# Patient Record
Sex: FEMALE | Race: WHITE | Employment: OTHER | URBAN - METROPOLITAN AREA
[De-identification: names, ages, dates, MRNs, and addresses within clinical notes are randomized per-mention and may not be internally consistent; named-entity substitution may affect disease eponyms.]

---

## 2019-08-29 ENCOUNTER — ANESTHESIA EVENT (OUTPATIENT)
Dept: ENDOSCOPY | Age: 69
End: 2019-08-29
Payer: MEDICARE

## 2019-08-29 ENCOUNTER — HOSPITAL ENCOUNTER (OUTPATIENT)
Age: 69
Setting detail: OUTPATIENT SURGERY
Discharge: HOME | End: 2019-08-30
Attending: INTERNAL MEDICINE | Admitting: INTERNAL MEDICINE
Payer: MEDICARE

## 2019-08-29 ENCOUNTER — ANESTHESIA (OUTPATIENT)
Dept: ENDOSCOPY | Age: 69
End: 2019-08-29
Payer: MEDICARE

## 2019-08-29 VITALS
DIASTOLIC BLOOD PRESSURE: 70 MMHG | TEMPERATURE: 97.9 F | BODY MASS INDEX: 25.58 KG/M2 | WEIGHT: 139 LBS | SYSTOLIC BLOOD PRESSURE: 120 MMHG | OXYGEN SATURATION: 99 % | HEART RATE: 69 BPM | HEIGHT: 62 IN | RESPIRATION RATE: 22 BRPM

## 2019-08-29 LAB
H PYLORI FROM TISSUE: NEGATIVE
H PYLORI FROM TISSUE: NEGATIVE
KIT LOT NO., HCLOLOT: NORMAL
KIT LOT NO., HCLOLOT: NORMAL
NEGATIVE CONTROL: NEGATIVE
NEGATIVE CONTROL: NEGATIVE
POSITIVE CONTROL: POSITIVE
POSITIVE CONTROL: POSITIVE

## 2019-08-29 PROCEDURE — 87077 CULTURE AEROBIC IDENTIFY: CPT | Performed by: INTERNAL MEDICINE

## 2019-08-29 PROCEDURE — 74011250636 HC RX REV CODE- 250/636: Performed by: NURSE ANESTHETIST, CERTIFIED REGISTERED

## 2019-08-29 PROCEDURE — 76060000031 HC ANESTHESIA FIRST 0.5 HR: Performed by: INTERNAL MEDICINE

## 2019-08-29 PROCEDURE — 77030021593 HC FCPS BIOP ENDOSC BSC -A: Performed by: INTERNAL MEDICINE

## 2019-08-29 PROCEDURE — 76040000019: Performed by: INTERNAL MEDICINE

## 2019-08-29 PROCEDURE — 88305 TISSUE EXAM BY PATHOLOGIST: CPT

## 2019-08-29 RX ORDER — FENTANYL CITRATE 50 UG/ML
200 INJECTION, SOLUTION INTRAMUSCULAR; INTRAVENOUS
Status: ACTIVE | OUTPATIENT
Start: 2019-08-29 | End: 2019-08-29

## 2019-08-29 RX ORDER — SODIUM CHLORIDE 0.9 % (FLUSH) 0.9 %
5-40 SYRINGE (ML) INJECTION EVERY 8 HOURS
Status: DISCONTINUED | OUTPATIENT
Start: 2019-08-29 | End: 2019-08-30 | Stop reason: HOSPADM

## 2019-08-29 RX ORDER — LOSARTAN POTASSIUM 50 MG/1
50 TABLET ORAL
COMMUNITY

## 2019-08-29 RX ORDER — NALOXONE HYDROCHLORIDE 0.4 MG/ML
0.4 INJECTION, SOLUTION INTRAMUSCULAR; INTRAVENOUS; SUBCUTANEOUS
Status: ACTIVE | OUTPATIENT
Start: 2019-08-29 | End: 2019-08-29

## 2019-08-29 RX ORDER — FLUMAZENIL 0.1 MG/ML
0.2 INJECTION INTRAVENOUS
Status: ACTIVE | OUTPATIENT
Start: 2019-08-29 | End: 2019-08-29

## 2019-08-29 RX ORDER — SODIUM CHLORIDE 9 MG/ML
50 INJECTION, SOLUTION INTRAVENOUS CONTINUOUS
Status: DISPENSED | OUTPATIENT
Start: 2019-08-29 | End: 2019-08-29

## 2019-08-29 RX ORDER — CLOPIDOGREL BISULFATE 75 MG/1
75 TABLET ORAL DAILY
COMMUNITY

## 2019-08-29 RX ORDER — DEXTROMETHORPHAN/PSEUDOEPHED 2.5-7.5/.8
1.2 DROPS ORAL
Status: DISCONTINUED | OUTPATIENT
Start: 2019-08-29 | End: 2019-08-30 | Stop reason: HOSPADM

## 2019-08-29 RX ORDER — MIDAZOLAM HYDROCHLORIDE 1 MG/ML
.25-1 INJECTION, SOLUTION INTRAMUSCULAR; INTRAVENOUS
Status: ACTIVE | OUTPATIENT
Start: 2019-08-29 | End: 2019-08-29

## 2019-08-29 RX ORDER — SODIUM CHLORIDE 9 MG/ML
INJECTION, SOLUTION INTRAVENOUS
Status: DISCONTINUED | OUTPATIENT
Start: 2019-08-29 | End: 2019-08-29 | Stop reason: HOSPADM

## 2019-08-29 RX ORDER — EPINEPHRINE 0.1 MG/ML
1 INJECTION INTRACARDIAC; INTRAVENOUS
Status: ACTIVE | OUTPATIENT
Start: 2019-08-29 | End: 2019-08-30

## 2019-08-29 RX ORDER — ZOLEDRONIC ACID 5 MG/100ML
5 INJECTION, SOLUTION INTRAVENOUS ONCE
COMMUNITY

## 2019-08-29 RX ORDER — SODIUM CHLORIDE 0.9 % (FLUSH) 0.9 %
5-40 SYRINGE (ML) INJECTION AS NEEDED
Status: DISCONTINUED | OUTPATIENT
Start: 2019-08-29 | End: 2019-08-30 | Stop reason: HOSPADM

## 2019-08-29 RX ORDER — PROPOFOL 10 MG/ML
INJECTION, EMULSION INTRAVENOUS AS NEEDED
Status: DISCONTINUED | OUTPATIENT
Start: 2019-08-29 | End: 2019-08-29 | Stop reason: HOSPADM

## 2019-08-29 RX ORDER — ATROPINE SULFATE 0.1 MG/ML
0.5 INJECTION INTRAVENOUS
Status: ACTIVE | OUTPATIENT
Start: 2019-08-29 | End: 2019-08-30

## 2019-08-29 RX ADMIN — PROPOFOL 50 MG: 10 INJECTION, EMULSION INTRAVENOUS at 10:20

## 2019-08-29 RX ADMIN — SODIUM CHLORIDE: 900 INJECTION, SOLUTION INTRAVENOUS at 10:14

## 2019-08-29 RX ADMIN — PROPOFOL 100 MG: 10 INJECTION, EMULSION INTRAVENOUS at 10:17

## 2019-08-29 RX ADMIN — PROPOFOL 50 MG: 10 INJECTION, EMULSION INTRAVENOUS at 10:18

## 2019-08-29 NOTE — DISCHARGE INSTRUCTIONS
Dr Chidi Bhatti of [address] [place] [address]. [address], Jason Hassan  [id number]  1950    EGD DISCHARGE INSTRUCTIONS    DISCOMFORT  Sore throat-  warm salt water gargle  redness at IV site- apply warm compress to area; if redness or soreness persist- contact your physician  Gaseous discomfort- walking, belching will help relieve any discomfort  You may not operate a vehicle for 12 hours  You may not engage in an occupation involving machinery or appliances for rest of today  You may not drink alcoholic beverages for at least 12 hours  Avoid making any critical decisions for at least 24 hour  DIET  You may resume your regular diet. Avoid these foods: vegetables, fried / greasy foods, carbonated drinks    ACTIVITY  Spend the remainder of the day resting -  avoid any strenuous activity. You may then resume your normal daily activities. CALL M.D.  IF ANY SIGN OF   Increasing pain, nausea, vomiting  Abdominal distension (swelling)  New increased bleeding (oral or rectal)  Fever (chills)  Pain in chest area  Bloody discharge from nose or mouth  Shortness of breath    FOLLOW-UP INSTRUCTIONS:  Call Dr. Jarrell Mcqueen for any questions or problems. Telephone # [phone]  Biopsy results available  in  5 to 7 days. We will see you in the office in 2 weeks. Impression:  Gastritis  Hiatal Hernia  Esophagitis  Patient Education   Patient Education   Patient Education        Esophagitis: Care Instructions  Your Care Instructions    Esophagitis (say \"ih-sof-uh-JY-tus\") is irritation of the esophagus, the tube that carries food from your throat to your stomach. Acid reflux is the most common cause of this condition. When you have reflux, stomach acid and juices flow upward. This can cause pain or a burning feeling in your chest. You may have a sore throat. It may be hard to swallow.   Other causes of this condition include some medicines and supplements. Allergies or an infection can also cause it. Your doctor will ask about your symptoms and past health. He or she might do tests to find the cause of your symptoms. Treatment depends on what is causing the problem. Treatment might include changing your diet or taking medicine to relieve your symptoms. It might also include changing a medicine that is causing your symptoms. If you have reflux, medicine that reduces the stomach acid helps your body heal. It might take 1 to 3 weeks to heal.  Follow-up care is a key part of your treatment and safety. Be sure to make and go to all appointments, and call your doctor if you are having problems. It's also a good idea to know your test results and keep a list of the medicines you take. How can you care for yourself at home? · If you have acid reflux, your doctor may recommend that you:  ? Eat several small meals instead of two or three large meals. After you eat, wait 2 to 3 hours before you lie down. ? Avoid chocolate, mint, alcohol, and spicy foods. ? Don't smoke or use smokeless tobacco. Smoking can make this condition worse. If you need help quitting, talk to your doctor about stop-smoking programs and medicines. These can increase your chances of quitting for good. ? Raise the head of your bed 6 to 8 inches if you have symptoms at night. ? Lose weight if you are overweight. ? Take an over-the-counter antacid, such as Maalox, Mylanta, or Tums. Be careful when you take over-the-counter antacid medicines. Many of these medicines have aspirin in them. Read the label to make sure that you are not taking more than the recommended dose. Too much aspirin can be harmful. ? Take stronger acid reducers. Examples are famotidine (such as Pepcid), omeprazole (such as Prilosec), and ranitidine (such as Zantac). · If your condition is caused by infection, allergy, or other problems, use the medicine or treatments that your doctor recommends.   · Be safe with medicines. Take your medicines exactly as prescribed. Call your doctor if you think you are having a problem with your medicine. When should you call for help? Call your doctor now or seek immediate medical care if:    · You have new or worse belly pain.     · You are vomiting.    Watch closely for changes in your health, and be sure to contact your doctor if:    · You have new or worse symptoms of reflux.     · You have trouble or pain swallowing.     · You are losing weight.     · You do not get better as expected. Where can you learn more? Go to http://shameka-hiro.info/. Enter W390 in the search box to learn more about \"Esophagitis: Care Instructions. \"  Current as of: November 7, 2018  Content Version: 12.1  © 4744-7357 [employer]. Care instructions adapted under license by [employer] (which disclaims liability or warranty for this information). If you have questions about a medical condition or this instruction, always ask your healthcare professional. [place] [address] any warranty or liability for your use of this information. Hiatal Hernia: Care Instructions  Your Care Instructions  A hiatal hernia occurs when part of the stomach bulges into the chest cavity. A hiatal hernia may allow stomach acid and juices to back up into the esophagus (acid reflux). This can cause a feeling of burning, warmth, heat, or pain behind the breastbone. This feeling may often occur after you eat, soon after you lie down, or when you bend forward, and it may come and go. You also may have a sour taste in your mouth. These symptoms are commonly known as heartburn or reflux. But not all hiatal hernias cause symptoms. Follow-up care is a key part of your treatment and safety. Be sure to make and go to all appointments, and call your doctor if you are having problems.  It's also a good idea to know your test results and keep a list of the medicines you take.  How can you care for yourself at home? · Take your medicines exactly as prescribed. Call your doctor if you think you are having a problem with your medicine. · Do not take aspirin or other nonsteroidal anti-inflammatory drugs (NSAIDs), such as ibuprofen (Advil, Motrin) or naproxen (Aleve), unless your doctor says it is okay. Ask your doctor what you can take for pain. · Your doctor may recommend over-the-counter medicine. For mild or occasional indigestion, antacids such as Tums, Gaviscon, Maalox, or Mylanta may help. Your doctor also may recommend over-the-counter acid reducers, such as famotidine (Pepcid AC), cimetidine (Tagamet HB), ranitidine (Zantac 75 and Zantac 150), or omeprazole (Prilosec). Read and follow all instructions on the label. If you use these medicines often, talk with your doctor. · Change your eating habits. ? It's best to eat several small meals instead of two or three large meals. ? After you eat, wait 2 to 3 hours before you lie down. Late-night snacks aren't a good idea. ? Chocolate, mint, and alcohol can make heartburn worse. They relax the valve between the esophagus and the stomach. ? Spicy foods, foods that have a lot of acid (like tomatoes and oranges), and coffee can make heartburn symptoms worse in some people. If your symptoms are worse after you eat a certain food, you may want to stop eating that food to see if your symptoms get better. · Do not smoke or chew tobacco.  · If you get heartburn at night, raise the head of your bed 6 to 8 inches by putting the frame on blocks or placing a foam wedge under the head of your mattress. (Adding extra pillows does not work.)  · Do not wear tight clothing around your middle. · Lose weight if you need to. Losing just 5 to 10 pounds can help. When should you call for help?   Call your doctor now or seek immediate medical care if:    · You have new or worse belly pain.     · You are vomiting.    Watch closely for changes in your health, and be sure to contact your doctor if:    · You have new or worse symptoms of indigestion.     · You have trouble or pain swallowing.     · You are losing weight.     · You do not get better as expected. Where can you learn more? Go to http://shameka-hiro.info/. Enter W561 in the search box to learn more about \"Hiatal Hernia: Care Instructions. \"  Current as of: November 7, 2018  Content Version: 12.1  © 5496-3254 [employer]. Care instructions adapted under license by [employer] (which disclaims liability or warranty for this information). If you have questions about a medical condition or this instruction, always ask your healthcare professional. [address] any warranty or liability for your use of this information. Gastritis: Care Instructions  Your Care Instructions    Gastritis is a sore and upset stomach. It happens when something irritates the stomach lining. Many things can cause it. These include an infection such as the flu or something you ate or drank. Medicines or a sore on the lining of the stomach (ulcer) also can cause it. Your belly may bloat and ache. You may belch, vomit, and feel sick to your stomach. You should be able to relieve the problem by taking medicine. And it may help to change your diet. If gastritis lasts, your doctor may prescribe medicine. Follow-up care is a key part of your treatment and safety. Be sure to make and go to all appointments, and call your doctor if you are having problems. It's also a good idea to know your test results and keep a list of the medicines you take. How can you care for yourself at home? · If your doctor prescribed antibiotics, take them as directed. Do not stop taking them just because you feel better. You need to take the full course of antibiotics. · Be safe with medicines.  If your doctor prescribed medicine to decrease stomach acid, take it as directed. Call your doctor if you think you are having a problem with your medicine. · Do not take any other medicine, including over-the-counter pain relievers, without talking to your doctor first.  · If your doctor recommends over-the-counter medicine to reduce stomach acid, such as Pepcid AC, Prilosec, Tagamet HB, or Zantac 75, follow the directions on the label. · Drink plenty of fluids (enough so that your urine is light yellow or clear like water) to prevent dehydration. Choose water and other caffeine-free clear liquids. If you have kidney, heart, or liver disease and have to limit fluids, talk with your doctor before you increase the amount of fluids you drink. · Limit how much alcohol you drink. · Avoid coffee, tea, cola drinks, chocolate, and other foods with caffeine. They increase stomach acid. When should you call for help? Call 911 anytime you think you may need emergency care. For example, call if:    · You vomit blood or what looks like coffee grounds.     · You pass maroon or very bloody stools.    Call your doctor now or seek immediate medical care if:    · You start breathing fast and have not produced urine in the last 8 hours.     · You cannot keep fluids down.    Watch closely for changes in your health, and be sure to contact your doctor if:    · You do not get better as expected. Where can you learn more? Go to http://shameka-hiro.info/. Enter [id number] in the search box to learn more about \"Gastritis: Care Instructions. \"  Current as of: November 7, 2018  Content Version: 12.1  © 4929-5423 [employer]. Care instructions adapted under license by [employer] (which disclaims liability or warranty for this information). If you have questions about a medical condition or this instruction, always ask your healthcare professional. [address] any warranty or liability for your use of this information.

## 2019-08-29 NOTE — ANESTHESIA POSTPROCEDURE EVALUATION
Post-Anesthesia Evaluation and Assessment    Patient: Annita Menjivar MRN: [id number]  SSN: [id number]    YOB: 1950  Age: 77 y.o. Sex: female      I have evaluated the patient and they are stable and ready for discharge from the PACU. Cardiovascular Function/Vital Signs  Visit Vitals  /62   Pulse 73   Temp 36 °C (96.8 °F)   Resp 22   Ht 5' 1.5\" (1.562 m)   Wt 63 kg (139 lb)   SpO2 100%   BMI 25.84 kg/m²       Patient is status post MAC anesthesia for Procedure(s):  ESOPHAGOGASTRODUODENOSCOPY (EGD)  ESOPHAGOGASTRODUODENAL (EGD) BIOPSY. Nausea/Vomiting: None    Postoperative hydration reviewed and adequate. Pain:  Pain Scale 1: Numeric (0 - 10) (08/29/19 0949)  Pain Intensity 1: 0 (08/29/19 0949)   Managed    Neurological Status: At baseline    Mental Status, Level of Consciousness: Alert and  oriented to person, place, and time    Pulmonary Status:   O2 Device: Nasal cannula (08/29/19 1027)   Adequate oxygenation and airway patent    Complications related to anesthesia: None    Post-anesthesia assessment completed. No concerns    Signed By: Natalie Nathan MD     August 29, 2019              Procedure(s):  ESOPHAGOGASTRODUODENOSCOPY (EGD)  ESOPHAGOGASTRODUODENAL (EGD) BIOPSY.     MAC    <BSHSIANPOST>    Vitals Value Taken Time   /62 8/29/2019 10:27 AM   Temp 36 °C (96.8 °F) 8/29/2019 10:27 AM   Pulse 73 8/29/2019 10:27 AM   Resp 22 8/29/2019 10:27 AM   SpO2 100 % 8/29/2019 10:27 AM

## 2019-08-29 NOTE — ANESTHESIA PREPROCEDURE EVALUATION
Relevant Problems   No relevant active problems       Anesthetic History   No history of anesthetic complications            Review of Systems / Medical History  Patient summary reviewed, nursing notes reviewed and pertinent labs reviewed    Pulmonary  Within defined limits                 Neuro/Psych   Within defined limits           Cardiovascular    Hypertension          Past MI, CAD and cardiac stents         GI/Hepatic/Renal  Within defined limits              Endo/Other      Hypothyroidism  Arthritis and cancer     Other Findings              Physical Exam    Airway  Mallampati: II  TM Distance: > 6 cm  Neck ROM: normal range of motion   Mouth opening: Normal     Cardiovascular  Regular rate and rhythm,  S1 and S2 normal,  no murmur, click, rub, or gallop             Dental  No notable dental hx       Pulmonary  Breath sounds clear to auscultation               Abdominal  GI exam deferred       Other Findings            Anesthetic Plan    ASA: 3  Anesthesia type: MAC            Anesthetic plan and risks discussed with: Patient

## 2019-08-29 NOTE — ROUTINE PROCESS
Madison Madrid  1950  [id number]    Situation:  Verbal report received from: Cami Jernigan  Procedure: Procedure(s):  ESOPHAGOGASTRODUODENOSCOPY (EGD)  ESOPHAGOGASTRODUODENAL (EGD) BIOPSY    Background:    Preoperative diagnosis: EPIGASTRIC PAIN  DYSPHAGIA  Postoperative diagnosis: Gastritis  Hiatal Hernia  Esophagitis    :  Dr. Meghan Giraldo  Assistant(s): Endoscopy Technician-1: Moises Morgan  Endoscopy RN-1: Pa Arreaga RN    Specimens:   ID Type Source Tests Collected by Time Destination   1 : duodenum Preservative Duodenum  Ryan Bird MD 8/29/2019 1026 Pathology   2 : ge junction Preservative Esophagus, Distal  Ryan Bird MD 8/29/2019 1026 Pathology     H. Pylori  no    Assessment:  Intra-procedure medications     Anesthesia gave intra-procedure sedation and medications, see anesthesia flow sheet yes    Intravenous fluids: NS@ KVO     Vital signs stable     Abdominal assessment: round and soft     Recommendation:  Discharge patient per MD order.     Family or Friend   Permission to share finding with family or friend yes

## 2019-08-29 NOTE — PERIOP NOTES

## 2019-08-29 NOTE — PROCEDURES
[address]     Endoscopic Gastroduodenoscopy Procedure Note    Elizabeth Rivera  1950  [id number]    Procedure: Endoscopic Gastroduodenoscopy --diagnostic    Indication: Dysphagia     Pre-operative Diagnosis: see indication above    Post-operative Diagnosis: see findings below    : Deon Seha MD    Referring Provider:  Manuel Cesar MD      Anesthesia/Sedation:  MAC anesthesia Propofol    Airway assessment: No airway problems anticipated    Procedure Details     After infomed consent was obtained for the procedure, with all risks and benefits of procedure explained the patient was taken to the endoscopy suite and placed in the left lateral decubitus position. Following sequential administration of sedation as per above, the endoscope was inserted into the mouth and advanced under direct vision to second portion of the duodenum. A careful inspection was made as the gastroscope was withdrawn, including a retroflexed view of the proximal stomach; findings and interventions are described below. Findings:   Esophagus:Hiatus hernia. Esophagitis  Stomach: Gastritis  Duodenum/jejunum: normal      Therapies:  none    Specimens: Gastric, duodenal and esophageal biopsies taken           EBL:  None. Complications:   None; patient tolerated the procedure well. Implants: None    Surgical Assistant: None       Impression:    -Hiatus Hernias. Esophagitis. gastritis    Pt. was Alert. Left the endoscopy suite in good general condition. Recommendations:  -Follow up with me.     Deon Shea MD

## 2019-10-24 ENCOUNTER — HOSPITAL ENCOUNTER (OUTPATIENT)
Dept: LAB | Age: 69
Discharge: HOME | End: 2019-10-24

## 2021-11-28 ENCOUNTER — HOSPITAL ENCOUNTER (OUTPATIENT)
Age: 71
Setting detail: OBSERVATION
Discharge: HOME | End: 2021-11-30
Attending: STUDENT IN AN ORGANIZED HEALTH CARE EDUCATION/TRAINING PROGRAM | Admitting: INTERNAL MEDICINE
Payer: MEDICARE

## 2021-11-28 ENCOUNTER — APPOINTMENT (OUTPATIENT)
Dept: CT IMAGING | Age: 71
End: 2021-11-28
Attending: STUDENT IN AN ORGANIZED HEALTH CARE EDUCATION/TRAINING PROGRAM
Payer: MEDICARE

## 2021-11-28 DIAGNOSIS — R07.89 CHEST DISCOMFORT: ICD-10-CM

## 2021-11-28 DIAGNOSIS — R40.4 TRANSIENT ALTERATION OF AWARENESS: ICD-10-CM

## 2021-11-28 DIAGNOSIS — R41.3 GLOBAL AMNESIA: Primary | ICD-10-CM

## 2021-11-28 DIAGNOSIS — F05 ACUTE CONFUSIONAL STATE: ICD-10-CM

## 2021-11-28 DIAGNOSIS — R41.3 MEMORY LOSS, SHORT TERM: ICD-10-CM

## 2021-11-28 DIAGNOSIS — I67.4 HYPERTENSIVE ENCEPHALOPATHY: ICD-10-CM

## 2021-11-28 PROBLEM — R41.82 ALTERED MENTAL STATE: Status: ACTIVE | Noted: 2021-11-28

## 2021-11-28 PROBLEM — I63.9 ACUTE CVA (CEREBROVASCULAR ACCIDENT) (HCC): Status: ACTIVE | Noted: 2021-11-28

## 2021-11-28 LAB
ALBUMIN SERPL-MCNC: 3.9 G/DL (ref 3.5–5)
ALBUMIN/GLOB SERPL: 1.1 {RATIO} (ref 1.1–2.2)
ALP SERPL-CCNC: 53 U/L (ref 45–117)
ALT SERPL-CCNC: 28 U/L (ref 12–78)
ANION GAP SERPL CALC-SCNC: 10 MMOL/L (ref 5–15)
APPEARANCE UR: CLEAR
AST SERPL-CCNC: 26 U/L (ref 15–37)
BACTERIA URNS QL MICRO: NEGATIVE /HPF
BASOPHILS # BLD: 0.1 K/UL (ref 0–0.1)
BASOPHILS NFR BLD: 1 % (ref 0–1)
BILIRUB SERPL-MCNC: 0.4 MG/DL (ref 0.2–1)
BILIRUB UR QL: NEGATIVE
BUN SERPL-MCNC: 12 MG/DL (ref 6–20)
BUN/CREAT SERPL: 13 (ref 12–20)
CALCIUM SERPL-MCNC: 9.3 MG/DL (ref 8.5–10.1)
CHLORIDE SERPL-SCNC: 106 MMOL/L (ref 97–108)
CO2 SERPL-SCNC: 26 MMOL/L (ref 21–32)
COLOR UR: ABNORMAL
CREAT SERPL-MCNC: 0.92 MG/DL (ref 0.55–1.02)
DIFFERENTIAL METHOD BLD: ABNORMAL
EOSINOPHIL # BLD: 0.2 K/UL (ref 0–0.4)
EOSINOPHIL NFR BLD: 2 % (ref 0–7)
EPITH CASTS URNS QL MICRO: ABNORMAL /LPF
ERYTHROCYTE [DISTWIDTH] IN BLOOD BY AUTOMATED COUNT: 13.4 % (ref 11.5–14.5)
GLOBULIN SER CALC-MCNC: 3.5 G/DL (ref 2–4)
GLUCOSE BLD STRIP.AUTO-MCNC: 88 MG/DL (ref 65–117)
GLUCOSE SERPL-MCNC: 109 MG/DL (ref 65–100)
GLUCOSE UR STRIP.AUTO-MCNC: NEGATIVE MG/DL
HCT VFR BLD AUTO: 45.1 % (ref 35–47)
HGB BLD-MCNC: 14.5 G/DL (ref 11.5–16)
HGB UR QL STRIP: ABNORMAL
IMM GRANULOCYTES # BLD AUTO: 0 K/UL (ref 0–0.04)
IMM GRANULOCYTES NFR BLD AUTO: 0 % (ref 0–0.5)
INR PPP: 1 (ref 0.9–1.1)
KETONES UR QL STRIP.AUTO: NEGATIVE MG/DL
LEUKOCYTE ESTERASE UR QL STRIP.AUTO: NEGATIVE
LYMPHOCYTES # BLD: 4 K/UL (ref 0.8–3.5)
LYMPHOCYTES NFR BLD: 41 % (ref 12–49)
MCH RBC QN AUTO: 30.5 PG (ref 26–34)
MCHC RBC AUTO-ENTMCNC: 32.2 G/DL (ref 30–36.5)
MCV RBC AUTO: 94.7 FL (ref 80–99)
MONOCYTES # BLD: 0.8 K/UL (ref 0–1)
MONOCYTES NFR BLD: 9 % (ref 5–13)
NEUTS SEG # BLD: 4.7 K/UL (ref 1.8–8)
NEUTS SEG NFR BLD: 48 % (ref 32–75)
NITRITE UR QL STRIP.AUTO: NEGATIVE
NRBC # BLD: 0 K/UL (ref 0–0.01)
NRBC BLD-RTO: 0 PER 100 WBC
PH UR STRIP: 6.5 [PH] (ref 5–8)
PLATELET # BLD AUTO: 235 K/UL (ref 150–400)
PMV BLD AUTO: 11.4 FL (ref 8.9–12.9)
POTASSIUM SERPL-SCNC: 3.7 MMOL/L (ref 3.5–5.1)
PROT SERPL-MCNC: 7.4 G/DL (ref 6.4–8.2)
PROT UR STRIP-MCNC: NEGATIVE MG/DL
PROTHROMBIN TIME: 10.4 SEC (ref 9–11.1)
RBC # BLD AUTO: 4.76 M/UL (ref 3.8–5.2)
RBC #/AREA URNS HPF: ABNORMAL /HPF (ref 0–5)
SERVICE CMNT-IMP: NORMAL
SODIUM SERPL-SCNC: 142 MMOL/L (ref 136–145)
SP GR UR REFRACTOMETRY: 1.02 (ref 1–1.03)
TROPONIN-HIGH SENSITIVITY: 41 NG/L (ref 0–51)
UR CULT HOLD, URHOLD: NORMAL
UROBILINOGEN UR QL STRIP.AUTO: 0.2 EU/DL (ref 0.2–1)
WBC # BLD AUTO: 9.8 K/UL (ref 3.6–11)
WBC URNS QL MICRO: ABNORMAL /HPF (ref 0–4)

## 2021-11-28 PROCEDURE — 94762 N-INVAS EAR/PLS OXIMTRY CONT: CPT

## 2021-11-28 PROCEDURE — 80053 COMPREHEN METABOLIC PANEL: CPT

## 2021-11-28 PROCEDURE — 96375 TX/PRO/DX INJ NEW DRUG ADDON: CPT

## 2021-11-28 PROCEDURE — G0378 HOSPITAL OBSERVATION PER HR: HCPCS

## 2021-11-28 PROCEDURE — 74011250637 HC RX REV CODE- 250/637: Performed by: INTERNAL MEDICINE

## 2021-11-28 PROCEDURE — 70450 CT HEAD/BRAIN W/O DYE: CPT

## 2021-11-28 PROCEDURE — 85025 COMPLETE CBC W/AUTO DIFF WBC: CPT

## 2021-11-28 PROCEDURE — 85610 PROTHROMBIN TIME: CPT

## 2021-11-28 PROCEDURE — 82962 GLUCOSE BLOOD TEST: CPT

## 2021-11-28 PROCEDURE — 74011000258 HC RX REV CODE- 258: Performed by: STUDENT IN AN ORGANIZED HEALTH CARE EDUCATION/TRAINING PROGRAM

## 2021-11-28 PROCEDURE — 99285 EMERGENCY DEPT VISIT HI MDM: CPT

## 2021-11-28 PROCEDURE — 96365 THER/PROPH/DIAG IV INF INIT: CPT

## 2021-11-28 PROCEDURE — 70498 CT ANGIOGRAPHY NECK: CPT

## 2021-11-28 PROCEDURE — 81001 URINALYSIS AUTO W/SCOPE: CPT

## 2021-11-28 PROCEDURE — 93005 ELECTROCARDIOGRAM TRACING: CPT

## 2021-11-28 PROCEDURE — 74011250636 HC RX REV CODE- 250/636: Performed by: INTERNAL MEDICINE

## 2021-11-28 PROCEDURE — 36415 COLL VENOUS BLD VENIPUNCTURE: CPT

## 2021-11-28 PROCEDURE — 74011000636 HC RX REV CODE- 636: Performed by: STUDENT IN AN ORGANIZED HEALTH CARE EDUCATION/TRAINING PROGRAM

## 2021-11-28 PROCEDURE — 74011250636 HC RX REV CODE- 250/636: Performed by: STUDENT IN AN ORGANIZED HEALTH CARE EDUCATION/TRAINING PROGRAM

## 2021-11-28 PROCEDURE — 84484 ASSAY OF TROPONIN QUANT: CPT

## 2021-11-28 PROCEDURE — 96372 THER/PROPH/DIAG INJ SC/IM: CPT

## 2021-11-28 RX ORDER — CLOPIDOGREL BISULFATE 75 MG/1
75 TABLET ORAL DAILY
Status: DISCONTINUED | OUTPATIENT
Start: 2021-11-29 | End: 2021-11-29

## 2021-11-28 RX ORDER — BISACODYL 5 MG
5 TABLET, DELAYED RELEASE (ENTERIC COATED) ORAL DAILY PRN
Status: DISCONTINUED | OUTPATIENT
Start: 2021-11-28 | End: 2021-11-30 | Stop reason: HOSPADM

## 2021-11-28 RX ORDER — GUAIFENESIN 100 MG/5ML
81 LIQUID (ML) ORAL 2 TIMES DAILY
Status: DISCONTINUED | OUTPATIENT
Start: 2021-11-28 | End: 2021-11-30 | Stop reason: HOSPADM

## 2021-11-28 RX ORDER — ACETAMINOPHEN 325 MG/1
650 TABLET ORAL
Status: DISCONTINUED | OUTPATIENT
Start: 2021-11-28 | End: 2021-11-30 | Stop reason: HOSPADM

## 2021-11-28 RX ORDER — METOPROLOL TARTRATE 25 MG/1
25 TABLET, FILM COATED ORAL 2 TIMES DAILY
Status: DISCONTINUED | OUTPATIENT
Start: 2021-11-28 | End: 2021-11-30 | Stop reason: HOSPADM

## 2021-11-28 RX ORDER — HEPARIN SODIUM 5000 [USP'U]/ML
5000 INJECTION, SOLUTION INTRAVENOUS; SUBCUTANEOUS EVERY 8 HOURS
Status: DISCONTINUED | OUTPATIENT
Start: 2021-11-28 | End: 2021-11-30 | Stop reason: HOSPADM

## 2021-11-28 RX ORDER — ACETAMINOPHEN 650 MG/1
650 SUPPOSITORY RECTAL
Status: DISCONTINUED | OUTPATIENT
Start: 2021-11-28 | End: 2021-11-30 | Stop reason: HOSPADM

## 2021-11-28 RX ORDER — HYDRALAZINE HYDROCHLORIDE 20 MG/ML
10 INJECTION INTRAMUSCULAR; INTRAVENOUS
Status: DISCONTINUED | OUTPATIENT
Start: 2021-11-28 | End: 2021-11-30 | Stop reason: HOSPADM

## 2021-11-28 RX ORDER — LEVOTHYROXINE SODIUM 50 UG/1
50 TABLET ORAL
Status: DISCONTINUED | OUTPATIENT
Start: 2021-11-30 | End: 2021-11-30 | Stop reason: HOSPADM

## 2021-11-28 RX ORDER — ROSUVASTATIN CALCIUM 10 MG/1
10 TABLET, COATED ORAL DAILY
Status: DISCONTINUED | OUTPATIENT
Start: 2021-11-29 | End: 2021-11-30 | Stop reason: HOSPADM

## 2021-11-28 RX ORDER — SODIUM CHLORIDE, SODIUM LACTATE, POTASSIUM CHLORIDE, CALCIUM CHLORIDE 600; 310; 30; 20 MG/100ML; MG/100ML; MG/100ML; MG/100ML
75 INJECTION, SOLUTION INTRAVENOUS CONTINUOUS
Status: DISCONTINUED | OUTPATIENT
Start: 2021-11-28 | End: 2021-11-30 | Stop reason: HOSPADM

## 2021-11-28 RX ORDER — ONDANSETRON 2 MG/ML
4 INJECTION INTRAMUSCULAR; INTRAVENOUS
Status: DISCONTINUED | OUTPATIENT
Start: 2021-11-28 | End: 2021-11-30 | Stop reason: HOSPADM

## 2021-11-28 RX ORDER — LOSARTAN POTASSIUM 25 MG/1
25 TABLET ORAL DAILY
Status: DISCONTINUED | OUTPATIENT
Start: 2021-11-29 | End: 2021-11-30 | Stop reason: HOSPADM

## 2021-11-28 RX ADMIN — ASPIRIN 81 MG: 81 TABLET, CHEWABLE ORAL at 21:57

## 2021-11-28 RX ADMIN — HYDRALAZINE HYDROCHLORIDE 10 MG: 20 INJECTION INTRAMUSCULAR; INTRAVENOUS at 21:54

## 2021-11-28 RX ADMIN — SODIUM CHLORIDE, POTASSIUM CHLORIDE, SODIUM LACTATE AND CALCIUM CHLORIDE 75 ML/HR: 600; 310; 30; 20 INJECTION, SOLUTION INTRAVENOUS at 21:54

## 2021-11-28 RX ADMIN — HEPARIN SODIUM 5000 UNITS: 5000 INJECTION INTRAVENOUS; SUBCUTANEOUS at 21:58

## 2021-11-28 RX ADMIN — SODIUM CHLORIDE 100 MG: 9 INJECTION, SOLUTION INTRAVENOUS at 17:03

## 2021-11-28 RX ADMIN — ACETAMINOPHEN 650 MG: 325 TABLET ORAL at 21:57

## 2021-11-28 RX ADMIN — IOPAMIDOL 100 ML: 755 INJECTION, SOLUTION INTRAVENOUS at 16:15

## 2021-11-28 NOTE — ED NOTES
Plan of care and all tests ordered explained to pts adult son. Good understanding and agreement with plan was verbalized.

## 2021-11-28 NOTE — ED PROVIDER NOTES
66-year-old woman with history of coronary artery disease, MI, hypertension, dysphagia, hiatal hernia, no history of A. fib or stroke in the past presenting with her son. Chief complaint is acute altered mental status. She was having lunch with her son and suddenly became confused, was staring. Was unable to recall information that was just related to her such as why her daughter was not present. She was able to name the place that she was eating. She appeared confused and disoriented to her son. He denies any difficulty walking, dysarthria, aphasia. No complaint of extremity weakness or numbness. No headache. She now able to recall the restaurant name. Patient is on aspirin and Plavix.   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    Hypertension     Ill-defined condition     increased cholesterol    Ill-defined condition     osteoporosis    Ill-defined condition     dysphagia    Ill-defined condition     hiatal hernia    Thyroid disease        Past Surgical History:   Procedure Laterality Date    COLONOSCOPY N/A 8/30/2016    COLONOSCOPY performed by Avila Perez MD at [facility] ENDOSCOPY     [address]      x2    HX CHOLECYSTECTOMY      HX COLONOSCOPY      2009, 2016    HX DILATION AND CURETTAGE      HX GI      colonoscopy    HX HEART CATHETERIZATION      stents/has a significant blockage that is being watched    HX HEENT      thyroidectomy    HX HYSTERECTOMY      HX ORTHOPAEDIC      anterior muscle removed d/t 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 - has foot drop now    HX TONSILLECTOMY      PA CHEST SURGERY PROCEDURE UNLISTED      part of right lung removed for nodule - ?path         Family History:   Problem Relation Age of Onset    Stroke Mother         hemorrhagic stroke    Coronary Art Dis Mother     Hypertension Mother    Robert Carpio Other Mother         hydrocephaleus/had shunt    Stroke Father     Coronary Art Dis Father     Heart Surgery Father     Other Father         subdural hematoma    Other Other         cousin \"never woke up after surgery\" - pt unaware of circumstance    Cancer Other         Distant relative with colon cancer    Cancer Paternal Aunt         colon       Social History     Socioeconomic History    Marital status: SINGLE     Spouse name: Not on file    Number of children: Not on file    Years of education: Not on file    Highest education level: Not on file   Occupational History    Not on file   Tobacco Use    Smoking status: Never Smoker    Smokeless tobacco: Never Used   Substance and Sexual Activity    Alcohol use: No    Drug use: Not on file    Sexual activity: Not on file   Other Topics Concern    Not on file   Social History Narrative    Not on file     Social Determinants of Health     Financial Resource Strain:     Difficulty of Paying Living Expenses: Not on file   Food Insecurity:     Worried About [address] in the Last Year: Not on file    [address] in the Last Year: Not on file   Transportation Needs:     Lack of Transportation (Medical): Not on file    Lack of Transportation (Non-Medical):  Not on file   Physical Activity:     Days of Exercise per Week: Not on file    Minutes of Exercise per Session: Not on file   Stress:     Feeling of Stress : Not on file   Social Connections:     Frequency of Communication with Friends and Family: Not on file    Frequency of Social Gatherings with Friends and Family: Not on file    Attends [employer] Services: Not on file    Active Member of Clubs or Organizations: Not on file    Attends Club or Organization Meetings: Not on file    Marital Status: Not on file   Intimate Partner Violence:     Fear of Current or Ex-Partner: Not on file    Emotionally Abused: Not on file    Physically Abused: Not on file    Sexually Abused: Not on file   Housing Stability:     Unable to Pay for Housing in the Last Year: Not on file    Number of Places Lived in the Last Year: Not on file    Unstable Housing in the Last Year: Not on file         ALLERGIES: Duramorph [morphine (pf)] and Other plant, animal, environmental    Review of Systems   Constitutional: Negative for chills and fever. Eyes: Negative for visual disturbance. Respiratory: Negative for cough and shortness of breath. Cardiovascular: Negative for chest pain. Gastrointestinal: Negative for abdominal pain. Genitourinary: Negative for dysuria. Musculoskeletal: Negative for back pain, neck pain and neck stiffness. Neurological: Negative for tremors, speech difficulty, weakness and headaches. Psychiatric/Behavioral: Positive for confusion. All other systems reviewed and are negative. Vitals:    11/28/21 1620 11/28/21 1635 11/28/21 1650 11/28/21 1653   BP: (!) 154/60 (!) 179/73 (!) 180/69    Pulse: 82 75 79    Resp: 14 18 25    SpO2: 99% 98% 97%    Weight:    60.3 kg (132 lb 15 oz)   Height:    5' 3\" (1.6 m)            Physical Exam  Constitutional:       General: She is not in acute distress. Appearance: She is not toxic-appearing. HENT:      Head: Normocephalic and atraumatic. Mouth/Throat:      Mouth: Mucous membranes are moist.   Eyes:      General: No visual field deficit. Extraocular Movements: Extraocular movements intact. Right eye: No nystagmus. Left eye: No nystagmus. Cardiovascular:      Rate and Rhythm: Normal rate and regular rhythm. Heart sounds: Normal heart sounds. Pulmonary:      Effort: Pulmonary effort is normal. No respiratory distress. Breath sounds: Normal breath sounds. Abdominal:      Palpations: Abdomen is soft. Tenderness: There is no abdominal tenderness. Musculoskeletal:      Cervical back: Normal range of motion. Right lower leg: No edema. Left lower leg: No edema.    Skin:     Capillary Refill: Capillary refill takes less than 2 seconds. Neurological:      General: No focal deficit present. Mental Status: She is alert and oriented to person, place, and time. GCS: GCS eye subscore is 4. GCS verbal subscore is 5. GCS motor subscore is 6. Cranial Nerves: No cranial nerve deficit, dysarthria or facial asymmetry. Sensory: Sensation is intact. Motor: No pronator drift. Gait: Gait is intact. Psychiatric:         Attention and Perception: Attention normal.         Mood and Affect: Mood is anxious. Behavior: Behavior normal. Behavior is cooperative. Thought Content: Thought content normal.         Cognition and Memory: Cognition is not impaired. She exhibits impaired recent memory. Judgment: Judgment normal. Judgment is not impulsive or inappropriate. 3:37 PM  Code Stroke Level 1 was activated by the ER physician and ER nurse as the patient presented with symptoms consistent with stroke. The last time known well was approximately 30 minutes ago.  The patient is in the window for tPA administration and will be evaluated by the on-call acute care tele-neurologist.       MDM  Number of Diagnoses or Management Options     Amount and/or Complexity of Data Reviewed  Obtain history from someone other than the patient: yes (Son)  Review and summarize past medical records: yes (Previous dysphagia, followed by Dr. Julia Scherer)  Discuss the patient with other providers: yes (Remote neurologist)  Independent visualization of images, tracings, or specimens: yes (CT head)    Risk of Complications, Morbidity, and/or Mortality  Presenting problems: high  Diagnostic procedures: high  Management options: high      ED Course as of 11/28/21 1708   Sun Nov 28, 2021   1626 3:53 PM normal sinus rhythm left axis deviation, left ventricular hypertrophy, no STEMI [NS]      ED Course User Index  [NS] Jai Wilkerson MD       Procedures        MEDICAL DECISION MAKIN y.o. female presents with acute confusion and memory deficit   differential diagnosis includes but not limited to: Stroke versus transient global amnesia versus hypertensive encephalopathy versus metabolic encephalopathy. Hypoglycemia possible but initial glucose negative and symptoms persist    LABORATORY TESTS:  Labs Reviewed   CBC WITH AUTOMATED DIFF - Abnormal; Notable for the following components:       Result Value    ABS. LYMPHOCYTES 4.0 (*)     All other components within normal limits   METABOLIC PANEL, COMPREHENSIVE - Abnormal; Notable for the following components:    Glucose 109 (*)     GFR est non-AA 60 (*)     All other components within normal limits   URINALYSIS W/MICROSCOPIC - Abnormal; Notable for the following components:    Blood SMALL (*)     All other components within normal limits   URINE CULTURE HOLD SAMPLE   PROTHROMBIN TIME + INR   GLUCOSE, POC       IMAGING RESULTS:  CTA CODE NEURO HEAD AND NECK W CONT   Final Result   CTA Head:   1. No evidence of significant stenosis or aneurysm. CTA Neck:   1. No evidence of flow-limiting stenosis. 2. Mild stenosis (less than 50% by NASCET criteria) of the proximal bilateral   internal carotid arteries. CT CODE NEURO HEAD WO CONTRAST   Final Result   1. No evidence of acute infarct or intracranial hemorrhage. 2. Mild periventricular white matter disease is likely secondary to chronic   small vessel ischemic changes. MEDICATIONS GIVEN:  Medications   thiamine (B-1) 100 mg in 0.9% sodium chloride 50 mL IVPB (100 mg IntraVENous New Bag 21 1703)   iopamidoL (ISOVUE-370) 76 % injection 100 mL (100 mL IntraVENous Given 21 1615)       PROGRESS NOTE:   5:07 PM Patient's symptoms have not improved    EKG:  Reviewed   Sinus rhythm  CONSULTS:  Discussed with remote neurology:  5:08 PM recommend MRI, thiamine administration for possible TGA,     IMPRESSION:  1. Global amnesia    2. Memory loss, short term    3. Acute confusional state        PLAN:  - Admit to hospitalist    Total critical care time spent exclusive of procedures:  37 minutes      Perfect Serve Consult for Admission  5:08 PM    ED Room Number: WT05/TR05  Patient Name and age:  Vera Yanez 66 y.o.  female  Working Diagnosis:   1. Global amnesia    2. Memory loss, short term    3. Acute confusional state        COVID-19 Suspicion:  no  Sepsis present:  no  Reassessment needed: no  Code Status:  Full Code  Readmission: no  Isolation Requirements:  no  Recommended Level of Care:  telemetry  Department:[facility] - [phone]  Other: Patient with acute memory loss, confusion, immediately before presentation the emergency department. Initial imaging negative. Seen by stroke neurology service (remote service) suspect TGA versus stroke, not TPA candidate. Pool Mattson MD          Please note that this dictation was completed with Omnistream, the computer voice recognition software. Quite often unanticipated grammatical, syntax, homophones, and other interpretive errors are inadvertently transcribed by the computer software. Please disregard these errors. Please excuse any errors that have escaped final proofreading.

## 2021-11-28 NOTE — ED TRIAGE NOTES
Signs and symptoms: Pt family member states \"about 20min ago we were out to lunch and she suddenly became confused could not remember why we were there. \"  VAN score: neg  Last Known Well: 8061 11/28/21  Blood Glucose (EMS acceptable): 88   Blood Pressure (EMS acceptable): 192/78  Anticoagulants: yes    Code Stroke Level 1 initiated at this time. Mohit Thomson

## 2021-11-29 ENCOUNTER — APPOINTMENT (OUTPATIENT)
Dept: GENERAL RADIOLOGY | Age: 71
End: 2021-11-29
Attending: FAMILY MEDICINE
Payer: MEDICARE

## 2021-11-29 ENCOUNTER — APPOINTMENT (OUTPATIENT)
Dept: MRI IMAGING | Age: 71
End: 2021-11-29
Attending: INTERNAL MEDICINE
Payer: MEDICARE

## 2021-11-29 ENCOUNTER — APPOINTMENT (OUTPATIENT)
Dept: NON INVASIVE DIAGNOSTICS | Age: 71
End: 2021-11-29
Attending: INTERNAL MEDICINE
Payer: MEDICARE

## 2021-11-29 ENCOUNTER — APPOINTMENT (OUTPATIENT)
Dept: VASCULAR SURGERY | Age: 71
End: 2021-11-29
Attending: HOSPITALIST
Payer: MEDICARE

## 2021-11-29 LAB
ALBUMIN SERPL-MCNC: 3.4 G/DL (ref 3.5–5)
ALBUMIN/GLOB SERPL: 1.1 {RATIO} (ref 1.1–2.2)
ALP SERPL-CCNC: 46 U/L (ref 45–117)
ALT SERPL-CCNC: 25 U/L (ref 12–78)
ANION GAP SERPL CALC-SCNC: 7 MMOL/L (ref 5–15)
AST SERPL-CCNC: 24 U/L (ref 15–37)
ATRIAL RATE: 72 BPM
BASOPHILS # BLD: 0.1 K/UL (ref 0–0.1)
BASOPHILS NFR BLD: 0 % (ref 0–1)
BILIRUB SERPL-MCNC: 0.3 MG/DL (ref 0.2–1)
BNP SERPL-MCNC: 762 PG/ML
BUN SERPL-MCNC: 11 MG/DL (ref 6–20)
BUN/CREAT SERPL: 14 (ref 12–20)
CALCIUM SERPL-MCNC: 8.8 MG/DL (ref 8.5–10.1)
CALCULATED P AXIS, ECG09: 31 DEGREES
CALCULATED R AXIS, ECG10: -36 DEGREES
CALCULATED T AXIS, ECG11: 64 DEGREES
CHLORIDE SERPL-SCNC: 109 MMOL/L (ref 97–108)
CHOLEST SERPL-MCNC: 161 MG/DL
CO2 SERPL-SCNC: 24 MMOL/L (ref 21–32)
CREAT SERPL-MCNC: 0.76 MG/DL (ref 0.55–1.02)
CRP SERPL-MCNC: <0.29 MG/DL (ref 0–0.6)
D DIMER PPP FEU-MCNC: 1.57 MG/L FEU (ref 0–0.65)
DIAGNOSIS, 93000: NORMAL
DIFFERENTIAL METHOD BLD: ABNORMAL
EOSINOPHIL # BLD: 0 K/UL (ref 0–0.4)
EOSINOPHIL NFR BLD: 0 % (ref 0–7)
ERYTHROCYTE [DISTWIDTH] IN BLOOD BY AUTOMATED COUNT: 13.6 % (ref 11.5–14.5)
EST. AVERAGE GLUCOSE BLD GHB EST-MCNC: 108 MG/DL
FOLATE SERPL-MCNC: 9.3 NG/ML (ref 5–21)
GLOBULIN SER CALC-MCNC: 3.2 G/DL (ref 2–4)
GLUCOSE SERPL-MCNC: 109 MG/DL (ref 65–100)
HBA1C MFR BLD: 5.4 % (ref 4–5.6)
HCT VFR BLD AUTO: 42.1 % (ref 35–47)
HDLC SERPL-MCNC: 60 MG/DL
HDLC SERPL: 2.7 {RATIO} (ref 0–5)
HGB BLD-MCNC: 13.9 G/DL (ref 11.5–16)
IMM GRANULOCYTES # BLD AUTO: 0.1 K/UL (ref 0–0.04)
IMM GRANULOCYTES NFR BLD AUTO: 0 % (ref 0–0.5)
LDLC SERPL CALC-MCNC: 80.4 MG/DL (ref 0–100)
LYMPHOCYTES # BLD: 1.5 K/UL (ref 0.8–3.5)
LYMPHOCYTES NFR BLD: 10 % (ref 12–49)
MAGNESIUM SERPL-MCNC: 2.3 MG/DL (ref 1.6–2.4)
MCH RBC QN AUTO: 30.3 PG (ref 26–34)
MCHC RBC AUTO-ENTMCNC: 33 G/DL (ref 30–36.5)
MCV RBC AUTO: 91.9 FL (ref 80–99)
MONOCYTES # BLD: 0.7 K/UL (ref 0–1)
MONOCYTES NFR BLD: 5 % (ref 5–13)
NEUTS SEG # BLD: 12.4 K/UL (ref 1.8–8)
NEUTS SEG NFR BLD: 85 % (ref 32–75)
NRBC # BLD: 0 K/UL (ref 0–0.01)
NRBC BLD-RTO: 0 PER 100 WBC
P-R INTERVAL, ECG05: 120 MS
PHOSPHATE SERPL-MCNC: 3 MG/DL (ref 2.6–4.7)
PLATELET # BLD AUTO: 230 K/UL (ref 150–400)
PMV BLD AUTO: 11.6 FL (ref 8.9–12.9)
POTASSIUM SERPL-SCNC: 3.7 MMOL/L (ref 3.5–5.1)
PROT SERPL-MCNC: 6.6 G/DL (ref 6.4–8.2)
Q-T INTERVAL, ECG07: 398 MS
QRS DURATION, ECG06: 90 MS
QTC CALCULATION (BEZET), ECG08: 435 MS
RBC # BLD AUTO: 4.58 M/UL (ref 3.8–5.2)
SODIUM SERPL-SCNC: 140 MMOL/L (ref 136–145)
TRIGL SERPL-MCNC: 103 MG/DL (ref ?–150)
TROPONIN-HIGH SENSITIVITY: 27 NG/L (ref 0–51)
TROPONIN-HIGH SENSITIVITY: 35 NG/L (ref 0–51)
TSH SERPL DL<=0.05 MIU/L-ACNC: 1.8 UIU/ML (ref 0.36–3.74)
VENTRICULAR RATE, ECG03: 72 BPM
VIT B12 SERPL-MCNC: 435 PG/ML (ref 193–986)
VLDLC SERPL CALC-MCNC: 20.6 MG/DL
WBC # BLD AUTO: 14.7 K/UL (ref 3.6–11)

## 2021-11-29 PROCEDURE — 95816 EEG AWAKE AND DROWSY: CPT | Performed by: PSYCHIATRY & NEUROLOGY

## 2021-11-29 PROCEDURE — 93970 EXTREMITY STUDY: CPT

## 2021-11-29 PROCEDURE — 84100 ASSAY OF PHOSPHORUS: CPT

## 2021-11-29 PROCEDURE — 71045 X-RAY EXAM CHEST 1 VIEW: CPT

## 2021-11-29 PROCEDURE — 83880 ASSAY OF NATRIURETIC PEPTIDE: CPT

## 2021-11-29 PROCEDURE — 97116 GAIT TRAINING THERAPY: CPT

## 2021-11-29 PROCEDURE — 70551 MRI BRAIN STEM W/O DYE: CPT

## 2021-11-29 PROCEDURE — 99222 1ST HOSP IP/OBS MODERATE 55: CPT | Performed by: PSYCHIATRY & NEUROLOGY

## 2021-11-29 PROCEDURE — 97112 NEUROMUSCULAR REEDUCATION: CPT

## 2021-11-29 PROCEDURE — 92610 EVALUATE SWALLOWING FUNCTION: CPT

## 2021-11-29 PROCEDURE — 84484 ASSAY OF TROPONIN QUANT: CPT

## 2021-11-29 PROCEDURE — 74011250637 HC RX REV CODE- 250/637: Performed by: INTERNAL MEDICINE

## 2021-11-29 PROCEDURE — G0378 HOSPITAL OBSERVATION PER HR: HCPCS

## 2021-11-29 PROCEDURE — 97161 PT EVAL LOW COMPLEX 20 MIN: CPT

## 2021-11-29 PROCEDURE — 80053 COMPREHEN METABOLIC PANEL: CPT

## 2021-11-29 PROCEDURE — 74011250636 HC RX REV CODE- 250/636: Performed by: INTERNAL MEDICINE

## 2021-11-29 PROCEDURE — 97535 SELF CARE MNGMENT TRAINING: CPT

## 2021-11-29 PROCEDURE — 85025 COMPLETE CBC W/AUTO DIFF WBC: CPT

## 2021-11-29 PROCEDURE — 83036 HEMOGLOBIN GLYCOSYLATED A1C: CPT

## 2021-11-29 PROCEDURE — 96372 THER/PROPH/DIAG INJ SC/IM: CPT

## 2021-11-29 PROCEDURE — 82746 ASSAY OF FOLIC ACID SERUM: CPT

## 2021-11-29 PROCEDURE — 84443 ASSAY THYROID STIM HORMONE: CPT

## 2021-11-29 PROCEDURE — 83735 ASSAY OF MAGNESIUM: CPT

## 2021-11-29 PROCEDURE — 86140 C-REACTIVE PROTEIN: CPT

## 2021-11-29 PROCEDURE — 99203 OFFICE O/P NEW LOW 30 MIN: CPT | Performed by: SPECIALIST

## 2021-11-29 PROCEDURE — 97165 OT EVAL LOW COMPLEX 30 MIN: CPT

## 2021-11-29 PROCEDURE — 94760 N-INVAS EAR/PLS OXIMETRY 1: CPT

## 2021-11-29 PROCEDURE — 80061 LIPID PANEL: CPT

## 2021-11-29 PROCEDURE — 85379 FIBRIN DEGRADATION QUANT: CPT

## 2021-11-29 PROCEDURE — 97530 THERAPEUTIC ACTIVITIES: CPT

## 2021-11-29 PROCEDURE — 36415 COLL VENOUS BLD VENIPUNCTURE: CPT

## 2021-11-29 PROCEDURE — 82607 VITAMIN B-12: CPT

## 2021-11-29 RX ORDER — CLONAZEPAM 0.5 MG/1
0.25 TABLET ORAL
COMMUNITY
End: 2022-06-24

## 2021-11-29 RX ORDER — CLOPIDOGREL BISULFATE 75 MG/1
75 TABLET ORAL DAILY
Status: DISCONTINUED | OUTPATIENT
Start: 2021-11-29 | End: 2021-11-30 | Stop reason: HOSPADM

## 2021-11-29 RX ORDER — FAMOTIDINE 20 MG/1
40 TABLET, FILM COATED ORAL DAILY
COMMUNITY
End: 2022-06-24

## 2021-11-29 RX ORDER — LEVOTHYROXINE SODIUM 50 UG/1
50 TABLET ORAL
COMMUNITY

## 2021-11-29 RX ORDER — SUCRALFATE 1 G/1
1 TABLET ORAL 4 TIMES DAILY
COMMUNITY
End: 2022-06-24

## 2021-11-29 RX ADMIN — ASPIRIN 81 MG: 81 TABLET, CHEWABLE ORAL at 08:50

## 2021-11-29 RX ADMIN — HEPARIN SODIUM 5000 UNITS: 5000 INJECTION INTRAVENOUS; SUBCUTANEOUS at 22:07

## 2021-11-29 RX ADMIN — CLOPIDOGREL BISULFATE 75 MG: 75 TABLET ORAL at 13:06

## 2021-11-29 RX ADMIN — ASPIRIN 81 MG: 81 TABLET, CHEWABLE ORAL at 17:25

## 2021-11-29 RX ADMIN — LOSARTAN POTASSIUM 25 MG: 25 TABLET, FILM COATED ORAL at 13:06

## 2021-11-29 RX ADMIN — ROSUVASTATIN CALCIUM 10 MG: 10 TABLET, COATED ORAL at 13:06

## 2021-11-29 RX ADMIN — METOPROLOL TARTRATE 25 MG: 25 TABLET, FILM COATED ORAL at 13:06

## 2021-11-29 RX ADMIN — METOPROLOL TARTRATE 25 MG: 25 TABLET, FILM COATED ORAL at 17:25

## 2021-11-29 RX ADMIN — HEPARIN SODIUM 5000 UNITS: 5000 INJECTION INTRAVENOUS; SUBCUTANEOUS at 13:06

## 2021-11-29 RX ADMIN — SODIUM CHLORIDE, POTASSIUM CHLORIDE, SODIUM LACTATE AND CALCIUM CHLORIDE 75 ML/HR: 600; 310; 30; 20 INJECTION, SOLUTION INTRAVENOUS at 17:33

## 2021-11-29 RX ADMIN — ACETAMINOPHEN 650 MG: 650 SUPPOSITORY RECTAL at 03:57

## 2021-11-29 RX ADMIN — HEPARIN SODIUM 5000 UNITS: 5000 INJECTION INTRAVENOUS; SUBCUTANEOUS at 05:05

## 2021-11-29 NOTE — ROUTINE PROCESS
SPEECH PATHOLOGY BEDSIDE SWALLOW EVALUATION/DISCHARGE  Patient: Elkin Yip (45 y.o. female)  Date: 11/29/2021  Primary Diagnosis: Altered mental state [R41.82]       Precautions: aspiration       ASSESSMENT :  Based on the objective data described below, the patient presents with functional oral-pharyngeal swallow. She reports h/o reflux that was bad enough that she has received cardiac workups due to the amount of chest pain/pressure that it caused. She has stopped taking her PPIs  And started a \"Sweet Frog diet\" instead. Admitted 11-28-21 with AMS. HEad CT: negative x mild WM dz. PMH: CAD with stents, MI, HTN, dysphagia due to [facility], hypothyroid. .  Skilled acute therapy provided by a speech-language pathologist is not indicated at this time. PLAN :  Recommendations:  Ok for diet as tolerated. MD to consider restarting PPIs if there are any concerns about reflux in hospital.   Discharge Recommendations: None     SUBJECTIVE:   Patient stated Did I tell you my father had a massive stroke? He had trouble swallowing. .    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 - has foot drop now    HX TONSILLECTOMY      MA CHEST SURGERY PROCEDURE UNLISTED      part of right lung removed for nodule - ?path     Prior Level of Function/Home Situation:   Home Situation  Home Environment: Private residence  One/Two Story Residence: Two story  Living Alone: No  Support Systems: Child(mayi)  Patient Expects to be Discharged to[de-identified] House  Current DME Used/Available at Home: None  Diet prior to admission: regular, thins  Current Diet:  NPO b/c of h/o dysphagia (reflux)   Cognitive and Communication Status:  Neurologic State: Alert, Confused  Orientation Level: Oriented X4  Cognition: Appropriate decision making, Memory loss  Perception: Appears intact  Perseveration: Perseverates during conversation (\"Did I tell you my father had a massive stroke? \")  Safety/Judgement: Insight into deficits  Oral Assessment:  Oral Assessment  Labial: No impairment  Dentition: Natural  Oral Hygiene: WFL  Lingual: No impairment  Mandible: No impairment  P.O. Trials:  Patient Position: upright in bed  Vocal quality prior to P.O.: No impairment  Consistency Presented: Solid; Thin liquid; Pill/Tablet (wtih RN)  How Presented: Self-fed/presented; Straw; Successive swallows   ORAL PHASE:   Bolus Acceptance: No impairment  Bolus Formation/Control: No impairment     Propulsion: No impairment  Oral Residue: None   PHARYNGEAL PHASE:   Initiation of Swallow: No impairment  Laryngeal Elevation: Functional  Aspiration Signs/Symptoms: None  Pharyngeal Phase Characteristics: No impairment, issues, or problems                    NOMS:   The NOMS functional outcome measure was used to quantify this patient's level of swallowing impairment.   Based on the NOMS, the patient was determined to be at level 7 for swallow function     NOMS Swallowing Levels:  Level 1 (CN): NPO  Level 2 (CM): NPO but takes consistency in therapy  Level 3 (CL): Takes less than 50% of nutrition p.o. and continues with nonoral feedings; and/or safe with mod cues; and/or max diet restriction  Level 4 (CK): Safe swallow but needs mod cues; and/or mod diet restriction; and/or still requires some nonoral feeding/supplements  Level 5 (CJ): Safe swallow with min diet restriction; and/or needs min cues  Level 6 (CI): Independent with p.o.; rare cues; usually self cues; may need to avoid some foods or needs extra time  Level 7 ([facility]): Independent for all p.o.  ANGELICA. (2003). National Outcomes Measurement System (NOMS): Adult Speech-Language Pathology User's Guide. Pain:  Pain Scale 1: Numeric (0 - 10)  Pain Intensity 1: 2  Pain Location 1: Head  After treatment:   Patient left in no apparent distress in bed, Call bell within reach and Nursing notified    COMMUNICATION/EDUCATION:   Patient was educated regarding her deficit(s) of NONE as this relates to her diagnosis of CVA. She demonstrated Good understanding as evidenced by discussion. .    The patient's plan of care including recommendations, planned interventions, and recommended diet changes were discussed with: Registered nurse.      Thank you for this referral.  ROBERT Lind  Time Calculation: 15 mins

## 2021-11-29 NOTE — PROGRESS NOTES
CM follow up:    Order received for rolling walker, referral sent via [employer] and accepted by [facility]. [address] obtained from inventory closet and delivered to bedside.   Delivery ticket signed and attached to [employer] referral.    Vinicius Tian, RN, MSN/Care manager  [phone]

## 2021-11-29 NOTE — ED NOTES
TRANSFER - OUT REPORT:    Verbal report given to Porsha Yuen RN on Emily Santo  being transferred to [address] [place] [address] for routine progression of care       Report consisted of patients Situation, Background, Assessment and   Recommendations(SBAR). Information from the following report(s) SBAR, Kardex, ED Summary, Procedure Summary, Intake/Output, MAR, Recent Results and Med Rec Status was reviewed with the receiving nurse. Lines:   Peripheral IV 11/28/21 Right Antecubital (Active)   Site Assessment Clean, dry, & intact 11/28/21 1554   Phlebitis Assessment 0 11/28/21 1554   Infiltration Assessment 0 11/28/21 1554   Dressing Status Clean, dry, & intact 11/28/21 1554   Dressing Type Non-adherent dressing 11/28/21 1554   Hub Color/Line Status Pink 11/28/21 1554   Action Taken Blood drawn 11/28/21 1554        Opportunity for questions and clarification was provided.       Patient transported with:   Monitor

## 2021-11-29 NOTE — PROGRESS NOTES
Hospitalist Progress Note    NAME: Roro Lou   :  1950   MRN:  [id number]     Subjective:   Daily Progress Note: 2021 7:47 AM      Chief complaint: memory loss   Patient seen and examined, does not remember what happened. She denies any headache/speech disturbance. She claims was seen by Dr. Carl Sapp recently and w/u was underway.      Current Facility-Administered Medications   Medication Dose Route Frequency    aspirin chewable tablet 81 mg  81 mg Oral BID    clopidogreL (PLAVIX) tablet 75 mg  75 mg Oral DAILY    levothyroxine (SYNTHROID) tablet 75 mcg  75 mcg Oral Once per day on Mon Tue Wed Thu Fri Sat    losartan (COZAAR) tablet 25 mg  25 mg Oral DAILY    metoprolol tartrate (LOPRESSOR) tablet 25 mg  25 mg Oral BID    rosuvastatin (CRESTOR) tablet 10 mg  10 mg Oral DAILY    acetaminophen (TYLENOL) tablet 650 mg  650 mg Oral Q4H PRN    Or    acetaminophen (TYLENOL) solution 650 mg  650 mg Per NG tube Q4H PRN    Or    acetaminophen (TYLENOL) suppository 650 mg  650 mg Rectal Q4H PRN    ondansetron (ZOFRAN) injection 4 mg  4 mg IntraVENous Q6H PRN    bisacodyL (DULCOLAX) tablet 5 mg  5 mg Oral DAILY PRN    heparin (porcine) injection 5,000 Units  5,000 Units SubCUTAneous Q8H    hydrALAZINE (APRESOLINE) 20 mg/mL injection 10 mg  10 mg IntraVENous Q6H PRN    lactated Ringers infusion  75 mL/hr IntraVENous CONTINUOUS          Objective:     Visit Vitals  /60 (BP 1 Location: Left upper arm, BP Patient Position: At rest)   Pulse 72   Temp 97.9 °F (36.6 °C)   Resp 16   Ht 5' 3\" (1.6 m)   Wt 60.3 kg (132 lb 15 oz)   SpO2 94%   BMI 23.55 kg/m²      O2 Device: None (Room air)    Temp (24hrs), Av.1 °F (36.7 °C), Min:97.8 °F (36.6 °C), Max:98.6 °F (37 °C)        PHYSICAL EXAM:  Gen WDWN  Neck supple  CVS RRR  Resp symmetric expansion  Abd soft ND  Ext moves all  Neuro ALert normal speech  Psych normal affect        Data Review    Recent Results (from the past 24 hour(s)) GLUCOSE, POC    Collection Time: 11/28/21  3:36 PM   Result Value Ref Range    Glucose (POC) 88 65 - 117 mg/dL    Performed by Eliane Pallas    EKG, 12 LEAD, INITIAL    Collection Time: 11/28/21  3:53 PM   Result Value Ref Range    Ventricular Rate 72 BPM    Atrial Rate 72 BPM    P-R Interval 120 ms    QRS Duration 90 ms    Q-T Interval 398 ms    QTC Calculation (Bezet) 435 ms    Calculated P Axis 31 degrees    Calculated R Axis -36 degrees    Calculated T Axis 64 degrees    Diagnosis       Normal sinus rhythm  Left axis deviation  Left ventricular hypertrophy with repolarization abnormality ( R in aVL ,   Jeremiah product )  Abnormal ECG  No previous ECGs available     CBC WITH AUTOMATED DIFF    Collection Time: 11/28/21  3:56 PM   Result Value Ref Range    WBC 9.8 3.6 - 11.0 K/uL    RBC 4.76 3.80 - 5.20 M/uL    HGB 14.5 11.5 - 16.0 g/dL    HCT 45.1 35.0 - 47.0 %    MCV 94.7 80.0 - 99.0 FL    MCH 30.5 26.0 - 34.0 PG    MCHC 32.2 30.0 - 36.5 g/dL    RDW 13.4 11.5 - 14.5 %    PLATELET 663 040 - 547 K/uL    MPV 11.4 8.9 - 12.9 FL    NRBC 0.0 0.0  WBC    ABSOLUTE NRBC 0.00 0.00 - 0.01 K/uL    NEUTROPHILS 48 32 - 75 %    LYMPHOCYTES 41 12 - 49 %    MONOCYTES 9 5 - 13 %    EOSINOPHILS 2 0 - 7 %    BASOPHILS 1 0 - 1 %    IMMATURE GRANULOCYTES 0 0 - 0.5 %    ABS. NEUTROPHILS 4.7 1.8 - 8.0 K/UL    ABS. LYMPHOCYTES 4.0 (H) 0.8 - 3.5 K/UL    ABS. MONOCYTES 0.8 0.0 - 1.0 K/UL    ABS. EOSINOPHILS 0.2 0.0 - 0.4 K/UL    ABS. BASOPHILS 0.1 0.0 - 0.1 K/UL    ABS. IMM.  GRANS. 0.0 0.00 - 0.04 K/UL    DF AUTOMATED     METABOLIC PANEL, COMPREHENSIVE    Collection Time: 11/28/21  3:56 PM   Result Value Ref Range    Sodium 142 136 - 145 mmol/L    Potassium 3.7 3.5 - 5.1 mmol/L    Chloride 106 97 - 108 mmol/L    CO2 26 21 - 32 mmol/L    Anion gap 10 5 - 15 mmol/L    Glucose 109 (H) 65 - 100 mg/dL    BUN 12 6 - 20 MG/DL    Creatinine 0.92 0.55 - 1.02 MG/DL    BUN/Creatinine ratio 13 12 - 20      GFR est AA >60 >60 ml/min/1.73m2    GFR est non-AA 60 (L) >60 ml/min/1.73m2    Calcium 9.3 8.5 - 10.1 MG/DL    Bilirubin, total 0.4 0.2 - 1.0 MG/DL    ALT (SGPT) 28 12 - 78 U/L    AST (SGOT) 26 15 - 37 U/L    Alk. phosphatase 53 45 - 117 U/L    Protein, total 7.4 6.4 - 8.2 g/dL    Albumin 3.9 3.5 - 5.0 g/dL    Globulin 3.5 2.0 - 4.0 g/dL    A-G Ratio 1.1 1.1 - 2.2     PROTHROMBIN TIME + INR    Collection Time: 11/28/21  3:56 PM   Result Value Ref Range    INR 1.0 0.9 - 1.1      Prothrombin time 10.4 9.0 - 11.1 sec   URINALYSIS W/MICROSCOPIC    Collection Time: 11/28/21  4:48 PM   Result Value Ref Range    Color YELLOW/STRAW      Appearance CLEAR CLEAR      Specific gravity 1.016 1.003 - 1.030      pH (UA) 6.5 5.0 - 8.0      Protein Negative NEG mg/dL    Glucose Negative NEG mg/dL    Ketone Negative NEG mg/dL    Bilirubin Negative NEG      Blood SMALL (A) NEG      Urobilinogen 0.2 0.2 - 1.0 EU/dL    Nitrites Negative NEG      Leukocyte Esterase Negative NEG      WBC 0-4 0 - 4 /hpf    RBC 0-5 0 - 5 /hpf    Epithelial cells FEW FEW /lpf    Bacteria Negative NEG /hpf   URINE CULTURE HOLD SAMPLE    Collection Time: 11/28/21  4:48 PM    Specimen: Serum; Urine   Result Value Ref Range    Urine culture hold        Urine on hold in Microbiology dept for 2 days. If unpreserved urine is submitted, it cannot be used for addtional testing after 24 hours, recollection will be required.    [facility] SENSITIVITY    Collection Time: 11/28/21 10:12 PM   Result Value Ref Range    Troponin-High Sensitivity 41 0 - 51 ng/L   TSH 3RD GENERATION    Collection Time: 11/29/21  2:00 AM   Result Value Ref Range    TSH 1.80 0.36 - 3.74 uIU/mL   MAGNESIUM    Collection Time: 11/29/21  2:00 AM   Result Value Ref Range    Magnesium 2.3 1.6 - 2.4 mg/dL   PHOSPHORUS    Collection Time: 11/29/21  2:00 AM   Result Value Ref Range    Phosphorus 3.0 2.6 - 4.7 MG/DL   METABOLIC PANEL, COMPREHENSIVE    Collection Time: 11/29/21  2:00 AM   Result Value Ref Range    Sodium 140 136 - 145 mmol/L    Potassium 3.7 3.5 - 5.1 mmol/L    Chloride 109 (H) 97 - 108 mmol/L    CO2 24 21 - 32 mmol/L    Anion gap 7 5 - 15 mmol/L    Glucose 109 (H) 65 - 100 mg/dL    BUN 11 6 - 20 MG/DL    Creatinine 0.76 0.55 - 1.02 MG/DL    BUN/Creatinine ratio 14 12 - 20      GFR est AA >60 >60 ml/min/1.73m2    GFR est non-AA >60 >60 ml/min/1.73m2    Calcium 8.8 8.5 - 10.1 MG/DL    Bilirubin, total 0.3 0.2 - 1.0 MG/DL    ALT (SGPT) 25 12 - 78 U/L    AST (SGOT) 24 15 - 37 U/L    Alk. phosphatase 46 45 - 117 U/L    Protein, total 6.6 6.4 - 8.2 g/dL    Albumin 3.4 (L) 3.5 - 5.0 g/dL    Globulin 3.2 2.0 - 4.0 g/dL    A-G Ratio 1.1 1.1 - 2.2     C REACTIVE PROTEIN, QT    Collection Time: 11/29/21  2:00 AM   Result Value Ref Range    C-Reactive protein <0.29 0.00 - 0.60 mg/dL   TROPONIN-HIGH SENSITIVITY    Collection Time: 11/29/21  2:00 AM   Result Value Ref Range    Troponin-High Sensitivity 35 0 - 51 ng/L   CBC WITH AUTOMATED DIFF    Collection Time: 11/29/21  2:00 AM   Result Value Ref Range    WBC 14.7 (H) 3.6 - 11.0 K/uL    RBC 4.58 3.80 - 5.20 M/uL    HGB 13.9 11.5 - 16.0 g/dL    HCT 42.1 35.0 - 47.0 %    MCV 91.9 80.0 - 99.0 FL    MCH 30.3 26.0 - 34.0 PG    MCHC 33.0 30.0 - 36.5 g/dL    RDW 13.6 11.5 - 14.5 %    PLATELET 920 980 - 156 K/uL    MPV 11.6 8.9 - 12.9 FL    NRBC 0.0 0  WBC    ABSOLUTE NRBC 0.00 0.00 - 0.01 K/uL    NEUTROPHILS 85 (H) 32 - 75 %    LYMPHOCYTES 10 (L) 12 - 49 %    MONOCYTES 5 5 - 13 %    EOSINOPHILS 0 0 - 7 %    BASOPHILS 0 0 - 1 %    IMMATURE GRANULOCYTES 0 0.0 - 0.5 %    ABS. NEUTROPHILS 12.4 (H) 1.8 - 8.0 K/UL    ABS. LYMPHOCYTES 1.5 0.8 - 3.5 K/UL    ABS. MONOCYTES 0.7 0.0 - 1.0 K/UL    ABS. EOSINOPHILS 0.0 0.0 - 0.4 K/UL    ABS. BASOPHILS 0.1 0.0 - 0.1 K/UL    ABS. IMM.  GRANS. 0.1 (H) 0.00 - 0.04 K/UL    DF AUTOMATED     D DIMER    Collection Time: 11/29/21  2:00 AM   Result Value Ref Range    D-dimer 1.57 (H) 0.00 - 0.65 mg/L FEU   TROPONIN-HIGH SENSITIVITY    Collection Time: 11/29/21 5:18 AM   Result Value Ref Range    Troponin-High Sensitivity 27 0 - 51 ng/L     No results found for this visit on 11/28/21. All Micro Results     Procedure Component Value Units Date/Time    URINE CULTURE HOLD SAMPLE [[id number]] Collected: 11/28/21 1648    Order Status: Completed Specimen: Urine from Serum Updated: 11/28/21 1651     Urine culture hold       Urine on hold in Microbiology dept for 2 days. If unpreserved urine is submitted, it cannot be used for addtional testing after 24 hours, recollection will be required. CTA Head:  1. No evidence of significant stenosis or aneurysm.     CTA Neck:  1. No evidence of flow-limiting stenosis. 2. Mild stenosis (less than 50% by NASCET criteria) of the proximal bilateral  internal carotid arteries. Assessment/Plan:         Problem list  1. Suspected acute cerebrovascular accident. 2.  Hypertension. 3.  Dyslipidemia. 4.  Hypothyroidism. 5.  Chest pain.        1. Acute memory loss/Suspected acute CVA. F/u MRI, MRA of the brain,   F/u echocardiogram   F/u  lipid profile. F/u  I09 and folic acid level. F/u  Inpatient Neurology consult  Consider EEG  Cont asa/plavix/statins  PT/OT eval    2. HTN monitor BP on home meds  3. Dyslipidemia. Crestor  4. Hypothyroidism. Synthroid. TSH level ok  5. CAD s/p stent with  CP ce neg. On asa, Plavix, and bb. Echo pend Cardiology to see   6. DD 1.57 check PVL      ADFC  DVT PRx heparin  NOK    Dispo: home if w/u neg.     Signed By: Royer Kelly MD     November 29, 2021

## 2021-11-29 NOTE — PROGRESS NOTES
Problem: Mobility Impaired (Adult and Pediatric)  Goal: *Acute Goals and Plan of Care (Insert Text)  Description: FUNCTIONAL STATUS PRIOR TO ADMISSION: Patient was independent and active without use of DME.    HOME SUPPORT PRIOR TO ADMISSION: The patient lived with son but did not require assist.    Physical Therapy Goals  Initiated 11/29/2021  1. Patient will move from supine to sit and sit to supine  in bed with modified independence within 7 day(s). 2.  Patient will transfer from bed to chair and chair to bed with supervision/set-up using the least restrictive device within 7 day(s). 3.  Patient will perform sit to stand with supervision/set-up within 7 day(s). 4.  Patient will ambulate with supervision/set-up for 150 feet with the least restrictive device within 7 day(s). 5.  Patient will ascend/descend 4 stairs with 2 handrail(s) with supervision/set-up within 7 day(s). Outcome: Progressing Towards Goal  Note:   PHYSICAL THERAPY EVALUATION- NEURO POPULATION  Patient: Lamberto Delong (57 y.o. female)  Date: 11/29/2021  Primary Diagnosis: Altered mental state [R41.82]        Precautions:   Fall      ASSESSMENT  Based on the objective data described below, the patient presents with acute confusion and unable to recall events of that day. Today she has decreased balance and activity tolerance. She required a RW for stability with upright activity for improved balance and safety. She has undergone a CT and MRI which only show an chronic Left infract. 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 Recommend return to wearing AFO for improved LE clearance. Vitals stable throughout- see below. .    Current Level of Function Impacting Discharge (mobility/balance): SBA-CGA with all upright activtiy, initally attmepted ambulation with hand held assist and CGA, but with RW able to ambulate with SBA    Functional Outcome Measure:  The patient scored Total: 23/56 on the [facility] Assessment which is indicative of moderate fall risk. Other factors to consider for discharge: return to AFO usage     Patient will benefit from skilled therapy intervention to address the above noted impairments. PLAN :  Recommendations and Planned Interventions: bed mobility training, transfer training, gait training, therapeutic exercises, and therapeutic activities      Frequency/Duration: Patient will be followed by physical therapy:  5 times a week to address goals.     Recommendation for discharge: (in order for the patient to meet his/her long term goals)  Physical therapy at least 2 days/week in the home     This discharge recommendation:  A follow-up discussion with the attending provider and/or case management is planned    IF patient discharges home will need the following DME: rolling walker         SUBJECTIVE:   Patient stated Goran Handsome feel right in the head    OBJECTIVE DATA SUMMARY:   HISTORY:    Past Medical History:   Diagnosis Date    Arthritis     right knee - cortison injection    CAD (coronary artery disease)     heart attack/stents    Cancer ([facility] [address]     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    Hypertension     Ill-defined condition     increased cholesterol    Ill-defined condition     osteoporosis    Ill-defined condition     dysphagia    Ill-defined condition     hiatal hernia    Thyroid disease      Past Surgical History:   Procedure Laterality Date    COLONOSCOPY N/A 8/30/2016    COLONOSCOPY performed by Bhavna Hoover MD at [facility] ENDOSCOPY    [address]      x2    HX CHOLECYSTECTOMY      HX COLONOSCOPY      2009, 2016    HX DILATION AND CURETTAGE      HX GI      colonoscopy    HX HEART CATHETERIZATION      stents/has a significant blockage that is being watched    HX HEENT      thyroidectomy    HX HYSTERECTOMY      HX ORTHOPAEDIC      anterior muscle removed d/t 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 - has foot drop now    HX TONSILLECTOMY      CT CHEST SURGERY PROCEDURE UNLISTED      part of right lung removed for nodule - ?path       Personal factors and/or comorbidities impacting plan of care: see above    Home Situation  Home Environment: Private residence  # Steps to Enter: 4  Rails to Enter: Yes  Hand Rails : Right  One/Two Story Residence: Two story  # of Interior Steps: 12  Living Alone: No  Support Systems: Child(mayi)  Patient Expects to be Discharged to[de-identified] House  Current DME Used/Available at Home: None    EXAMINATION/PRESENTATION/DECISION MAKING:   Vitals:    11/29/21 0840 11/29/21 1132 11/29/21 1135 11/29/21 1150   BP: 123/65 (!) 157/62 (!) 157/62 (!) 140/72   BP 1 Location: Left upper arm Left arm Left arm Left arm   BP Patient Position: At rest Sitting At rest Comment: post activity   Pulse: 79 69 64 67   Temp: 98.1 °F (36.7 °C)  98 °F (36.7 °C) 98.3 °F (36.8 °C)   Resp: 16  12 14   Height:       Weight:       SpO2: 95%  95% 94%       Critical Behavior:  Neurologic State: Alert, Confused  Orientation Level: Oriented X4  Cognition: Memory loss, Decreased attention/concentration, Follows commands  Safety/Judgement: Awareness of environment, Insight into deficits  Hearing:   Auditory  Auditory Impairment: None    Range Of Motion:  AROM: Within functional limits           PROM: Within functional limits           Strength:    Strength: Generally decreased, functional                    Tone & Sensation:   Tone: Normal              Sensation: Intact               Coordination:  Coordination: Generally decreased, functional  Vision:   Tracking: Able to track stimulus in all quadrants w/o difficulty  Diplopia: No  Corrective Lenses: Glasses  Functional Mobility:  Bed Mobility:     Supine to Sit: Modified independent  Sit to Supine: Modified independent  Scooting: Independent  Transfers:  Sit to Stand: Contact guard assistance  Stand to Sit: Contact guard assistance        Bed to Chair: Contact guard assistance              Balance:   Sitting: Intact  Standing: Impaired  Standing - Static: Fair  Standing - Dynamic : Fair  Ambulation/Gait Training:  Distance (ft): 150 Feet (ft)  Assistive Device: Walker, rolling; Gait belt  Ambulation - Level of Assistance: Contact guard assistance     Gait Description (WDL): Exceptions to WDL  Gait Abnormalities: Step to gait; Foot drop                              Functional Measure  Person Balance Test:    Sitting to Standin  Standing Unsupported: 3  Sitting with Back Unsupported: 4  Standing to Sitting: 3  Transfers: 1  Standing Unsupported with Eyes Closed: 3  Standing Unsupported with Feet Together: 2  Reach Forward with Outstretched Arm: 2   Object: 1  Turn to Look Over Shoulders: 1  Turn 360 Degrees: 1  Alternate Foot on Step/Stool: 0  Standing Unsupported One Foot in Front: 0  Stand on One Le  Total: 23/56         56=Maximum possible score;   0-20=High fall risk  21-40=Moderate fall risk   41-56=Low fall risk        Physical Therapy Evaluation Charge Determination   History Examination Presentation Decision-Making   HIGH Complexity :3+ comorbidities / personal factors will impact the outcome/ POC  MEDIUM Complexity : 3 Standardized tests and measures addressing body structure, function, activity limitation and / or participation in recreation  MEDIUM Complexity : Evolving with changing characteristics  Other outcome measures person  HIGH       Based on the above components, the patient evaluation is determined to be of the following complexity level: MEDIUM    Pain Rating:  None reported    Activity Tolerance:   Good    After treatment patient left in no apparent distress:   Supine in bed, Call bell within reach, and Bed / chair alarm activated    COMMUNICATION/EDUCATION:   The patients plan of care was discussed with: Occupational therapist and Registered nurse. Fall prevention education was provided and the patient/caregiver indicated understanding., Patient/family have participated as able in goal setting and plan of care. , and Patient/family agree to work toward stated goals and plan of care.     Thank you for this referral.  Cabrera Mendez, PT, DPT   Time Calculation: 30 mins

## 2021-11-29 NOTE — H&P
[facility] Handy [facility] [place] [facility]  HISTORY AND PHYSICAL    Name:  Kris Davies  MR#:  [id number]  :  1950  ACCOUNT #:  [de-identified]  ADMIT DATE:  2021      The patient was seen, evaluated, and admitted by me on 2021. PRIMARY CARE PHYSICIAN:  Uziel Portillo MD    SOURCE OF INFORMATION:  The patient, the patient's daughter and son who were present at the bedside. CHIEF COMPLAINT:  Confusion. HISTORY OF PRESENT ILLNESS:  This is a 75-year-old woman with a past medical history significant for hypertension; dyslipidemia; hypothyroidism; coronary artery disease, status post stent placement; sarcoma of the right thigh, status post resection, was in her usual state of health until the day of presentation at the emergency room when the patient developed confusion. The patient was having lunch with her son when all of the sudden she became confused and she did not know where she was. It was reported that the patient was staring at the jamie. No prior episode of TIA or CVA. The patient was taken to  [facility] Emergency Room at [facility] for further evaluation. When the patient arrived at the emergency room, code stroke level 1 was called. CT scan of the head was obtained, which did not show any acute pathology. CTA of the head and neck was then performed, which also did not show any significant abnormality. The patient was subsequently referred to the hospitalist service for evaluation for admission. She was seen by Neurology through the [facility] at the request of the emergency room physician. No record of prior admission to this hospital.  There was no history of fever, rigors, or chills. No history of seizure disorder. According to the patient's son, the patient had chest pain earlier during the day, which the patient thought was gas pain. The chest pain has since subsided.     PAST MEDICAL HISTORY:  Hypertension; dyslipidemia; hypothyroidism; coronary artery disease, status post stent placement; sarcoma of the right thigh, status post excision. ALLERGIES:  THE PATIENT IS ALLERGIC TO MORPHINE. MEDICATIONS:  Fosamax 70 mg every week on , aspirin 81 mg daily, Plavix 75 mg daily, Synthroid 75 mcg daily, losartan 25 mg daily, Lopressor 25 mg twice daily, Crestor 10 mg daily. FAMILY HISTORY:  This was reviewed. Her mother had hemorrhagic stroke, hypertension, and heart disease. Her father also had heart disease and stroke. PAST SURGICAL HISTORY:  This is significant for hysterectomy, excision of sarcoma from the right thigh with subsequent right footdrop,  section, cholecystectomy, hysterectomy. SOCIAL HISTORY:  No history of alcohol or tobacco abuse. REVIEW OF SYSTEMS:  HEAD, EYES, EARS, NOSE, AND THROAT:  This is positive for confusion. No headache. No blurring of vision. No photophobia. RESPIRATORY SYSTEM:  No cough, no shortness of breath, no hemoptysis. CARDIOVASCULAR SYSTEM:  No chest pain, no orthopnea, no palpitation. GASTROINTESTINAL SYSTEM:  No nausea or vomiting. No diarrhea. No constipation. GENITOURINARY SYSTEM:  No dysuria, no urgency, and no frequency. All other systems are reviewed and they are negative. PHYSICAL EXAMINATION:  GENERAL APPEARANCE:  The patient appeared ill, in moderate distress. VITAL SIGNS:  On arrival at the emergency room, temperature 97.8, pulse 76, respiratory rate 21, blood pressure 192/78, oxygen saturation 100%. HEENT:  Head:  Normocephalic, atraumatic. Eyes:  Normal eye movement. No redness, no drainage, no discharge. Ears:  Normal external ears with no evidence of drainage. Nose:  No deformity and no drainage. Mouth and Throat:  No visible oral lesion. NECK:  Neck is supple. No JVD, no thyromegaly. CHEST:  Clear breath sounds. No wheezing, no crackles. HEART:  Normal S1 and S2, regular. No clinically appreciable murmur. ABDOMEN:  Soft, nontender.   Normal bowel sounds. CNS:  Alert and oriented to person and place. No gross focal neurological deficit. EXTREMITIES:  Edema 2+. Pulses 2+ bilaterally. MUSCULOSKELETAL SYSTEM:  No evidence of joint deformity or swelling. SKIN:  No active skin lesions seen in the exposed part of the body. PSYCHIATRY:  Unable to assess mood and affect. LYMPHATIC SYSTEM:  No cervical lymphadenopathy. DIAGNOSTIC DATA:  EKG shows normal sinus rhythm, left ventricular hypertrophy, and nonspecific ST and T-waves abnormality. CT scan of the head without contrast, no evidence of acute infarct, or intracranial hemorrhage. CTA of the head and neck, no evidence of significant stenosis or aneurysm. LABORATORY DATA:  Hematology:  WBC 9.8, hemoglobin at 14.5, hematocrit 45.1, platelets of 322. Coagulation Profile:  INR 1.0, PT 10.9. Chemistry:  Sodium 142, potassium 3.7, chloride 106, CO2 of 26, glucose 109, BUN 12 , creatinine 0.92, calcium 9.3, total bilirubin 0.4, ALT 28, AST 26, alkaline phosphatase 53, total protein 7.4, albumin level 3.9, globulin at 3.5. Urinalysis: This is significant for negative nitrite, negative leukocyte esterase, negative bacteria. Troponin high sensitivity 41. ASSESSMENT:  1. Suspected acute cerebrovascular accident. 2.  Hypertension. 3.  Dyslipidemia. 4.  Hypothyroidism. 5.  Chest pain. PLAN:  1. Suspected acute CVA. We will admit the patient for further evaluation and treatment. The patient developed sudden onset of confusion, which could be due to stroke. Code stroke was called when the patient arrived at the emergency room. We will obtain MRI, MRA of the brain, echocardiogram to evaluate the patient for stroke as a possible cause of acute confusional state. We will check lipid profile. We will check hemoglobin A1c level. We will check C-reactive protein level to evaluate the patient was systemic inflammation. We will also check O80 and folic acid level.   Inpatient Neurology consult will be requested to assist in further evaluation and treatment. 2.  Hypertension. We will resume preadmission medication and monitor the patient's blood pressure closely while allowing for permissive hypertension. The patient presented with markedly elevated blood pressure. We will place the patient on hydralazine as needed. 3.  Dyslipidemia. We will continue with Crestor and check lipid profile. 4.  Hypothyroidism. We will continue with Synthroid. We will check TSH level. 5.  Chest pain. This is a known patient with coronary artery disease with status post stent placement. We will check serial cardiac markers to rule out acute myocardial infarction. We will continue with aspirin, Plavix, and beta blocker. We will also evaluate the patient for atypical causes of chest pain by checking lipase level. We will also check D-dimer to evaluate the patient for thromboembolism as a possible cause of chest pain. Cardiology consult will be requested to assist in further evaluation and treatment. OTHER ISSUES:  Code status: The patient is a full code. We will place the patient on heparin for DVT prophylaxis. FUNCTIONAL STATUS PRIOR TO ADMISSION:  The patient came from home. The patient is ambulatory with no assistive device. COVID PRECAUTION:  The patient was wearing a facemask. I was wearing a facemask and gloves for this patient's encounter.         Kendrick Nielsen MD      [id number]  D:  11/29/2021 4:37  T:  11/29/2021 6:28  JOB #:  [id number]  CC:  Katie Reveles MD

## 2021-11-29 NOTE — PROCEDURES
[facility] [place] [address]     Electroencephalogram Report    Procedure ID: SFA  Procedure Date: 11/29/2021   Patient Name: Clara Jacob YOB: 1950   Procedure Type: Routine Medical Record No: [id number]     INDICATION: Altered mental status    STATE: Awake and drowsy . DESCRIPTION OF PROCEDURE: Electrodes were applied in accordance with the international 10-20 system of electrode placement. EEG was reviewed in both bipolar and referential montages. Description of Activity:  During wakefulness, there is continuous runs of 10-11 Hz symmetric posterior alpha rhythm, that attenuate symmetrically with eye opening. Low voltage beta activity occurs symmetrically at the anterior head regions bilaterally. During drowsiness, there is attenuation of the alpha rhythm and low voltage theta activity occurs bilaterally. Intermittent photic stimulation was performed and induces bilaterally symmetric posterior driving responses. No sharp or spike discharges, seizures or epileptiform discharges seen. No focal asymmetry. Clinical Interpretation: This EEG, performed during wakefulness and drowsiness is normal. There is no focal asymmetry, seizures or epileptiform discharges seen.        Medications:  Current Facility-Administered Medications   Medication Dose Route Frequency    clopidogreL (PLAVIX) tablet 75 mg  75 mg Oral DAILY    aspirin chewable tablet 81 mg  81 mg Oral BID    [START ON 11/30/2021] levothyroxine (SYNTHROID) tablet 50 mcg  50 mcg Oral 7am    losartan (COZAAR) tablet 25 mg  25 mg Oral DAILY    metoprolol tartrate (LOPRESSOR) tablet 25 mg  25 mg Oral BID    rosuvastatin (CRESTOR) tablet 10 mg  10 mg Oral DAILY    acetaminophen (TYLENOL) tablet 650 mg  650 mg Oral Q4H PRN    Or    acetaminophen (TYLENOL) solution 650 mg  650 mg Per NG tube Q4H PRN    Or    acetaminophen (TYLENOL) suppository 650 mg  650 mg Rectal Q4H PRN    ondansetron (ZOFRAN) injection 4 mg  4 mg IntraVENous Q6H PRN    bisacodyL (DULCOLAX) tablet 5 mg  5 mg Oral DAILY PRN    heparin (porcine) injection 5,000 Units  5,000 Units SubCUTAneous Q8H    hydrALAZINE (APRESOLINE) 20 mg/mL injection 10 mg  10 mg IntraVENous Q6H PRN    lactated Ringers infusion  75 mL/hr IntraVENous CONTINUOUS

## 2021-11-29 NOTE — PROGRESS NOTES
Occupational Therapy:  11/29/21    Orders received, chart reviewed and patient evaluated by occupational therapy. Pending progression with skilled acute occupational therapy, recommend:  Occupational therapy at least 2 days/week in the home      Recommend with nursing ADLs with supervision/setup, OOB to chair 3x/day and toileting via functional mobility to and from bathroom with 1 assist. Thank you for completing as able in order to maintain patient strength, endurance and independence. Full evaluation to follow.      Thank you,  Dionne Rice, OTR/L

## 2021-11-29 NOTE — PROGRESS NOTES
TRANSFER - OUT REPORT:    Verbal report given to [facility] (name) on Sumi Smith  being transferred to [facility](unit) for routine progression of care       Report consisted of patients Situation, Background, Assessment and   Recommendations(SBAR). Information from the following report(s) SBAR, Kardex, Intake/Output, MAR and Recent Results was reviewed with the receiving nurse. Lines:   Peripheral IV 11/28/21 Right Antecubital (Active)   Site Assessment Clean, dry, & intact 11/28/21 2020   Phlebitis Assessment 0 11/28/21 2020   Infiltration Assessment 0 11/28/21 2020   Dressing Status Clean, dry, & intact 11/28/21 2020   Dressing Type Non-adherent dressing 11/28/21 2020   Hub Color/Line Status Pink 11/28/21 2020   Action Taken Blood drawn 11/28/21 0914        Opportunity for questions and clarification was provided.       Patient transported with:   Registered Nurse  Tech

## 2021-11-29 NOTE — CONSULTS
NEUROLOGY CONSULT NOTE    Patient ID:  Cristal Lugo  [id number]  88 y.o.  1950    Date of Consultation:  November 29, 2021    Referring Physician: Dr. 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    Hypertension     Ill-defined condition     increased cholesterol    Ill-defined condition     osteoporosis    Ill-defined condition     dysphagia    Ill-defined condition     hiatal hernia    Thyroid disease       Past Surgical History:   Procedure Laterality Date    COLONOSCOPY N/A 8/30/2016    COLONOSCOPY performed by Charlotte Del Valle MD at [facility] ENDOSCOPY     [address]      x2    HX CHOLECYSTECTOMY      HX COLONOSCOPY      2009, 2016    HX DILATION AND CURETTAGE      HX GI      colonoscopy    HX HEART CATHETERIZATION      stents/has a significant blockage that is being watched    HX HEENT      thyroidectomy    HX HYSTERECTOMY      HX ORTHOPAEDIC      anterior muscle removed d/t 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 Authorizing Provider   metoprolol tartrate (LOPRESSOR) 25 mg tablet Take 25 mg by mouth two (2) times a day. Yes Provider, Historical   zoledronic acid (RECLAST) 5 mg/100 mL pgbk 5 mg by IntraVENous route once. Provider, Historical   clopidogrel (PLAVIX) 75 mg tab Take 75 mg by mouth. Provider, Historical   losartan (COZAAR) 25 mg tablet Take  by mouth daily.     Provider, Historical ASPIRIN PO Take 81 mg by mouth two (2) times a day. Provider, Historical   alendronate (FOSAMAX) 70 mg tablet Take 70 mg by mouth every seven (7) days. Takes on Sunday. Provider, Historical   rosuvastatin (CRESTOR) 10 mg tablet Take 10 mg by mouth daily. Provider, Historical   levothyroxine (SYNTHROID) 75 mcg tablet Take 75 mcg by mouth six (6) days a week. Does not take on Sundays. Provider, Historical   cholecalciferol (VITAMIN D3) 1,000 unit tablet Take  by mouth. Takes 2000 units occasionally. Provider, Historical   OTHER Takes one eye health and lutein vitamin and mineral supplement po occasionally. Provider, Historical   MULTIVITAMIN WITH MINERALS (HAIR,SKIN AND NAILS PO) Take  by mouth. Takes one po occasionally. Provider, Historical     Allergies   Allergen Reactions    Duramorph [Morphine (Pf)] Itching and Nausea Only     Nausea came after pt was given something for itching.  Other Plant, Animal, Environmental Other (comments)     Mite and grass allergies. Social History     Tobacco Use    Smoking status: Never Smoker    Smokeless tobacco: Never Used   Substance Use Topics    Alcohol use: No      Family History   Problem Relation Age of Onset    Stroke Mother         hemorrhagic stroke    Coronary Art Dis Mother     Hypertension Mother     Other Mother         hydrocephaleus/had shunt    Stroke Father     Coronary Art Dis Father     Heart Surgery Father     Other Father         subdural hematoma    Other Other         cousin \"never woke up after surgery\" - pt unaware of circumstance    Cancer Other         Distant relative with colon cancer    Cancer Paternal Aunt         colon        Subjective:      Suresh Kenney is a 66 y.o. female with history of CAD, MI, hypertension, dysphagia and hiatal hernia who was admitted from the ER for altered mental status. Patient was apparently having lunch when she suddenly became confused and was staring.  Unable to recall information and what was being related to her as to why her daughter was not present. No issues with speech changes or focal weakness or problems walking. Patient was brought to the ER. In the ER blood pressure was 192/78. NIH stroke scale was 1. Patient was seen by teleneurology and deemed not a TPA candidate. Labs done revealed unremarkable CBC, PTT, INR, urinalysis, troponin, CMP, TSH, magnesium, phosphorus, hemoglobin A1c, B12, CRP, D-dimer was folate and lipid panel. LDL was 80.4. Elevated D-dimer at 1.57. EKG revealed normal sinus rhythm. Head CT without contrast did not reveal any acute process. Mild periventricular white matter disease. Head and neck CTA with contrast revealed no evidence of flow-limiting stenosis or aneurysm. Mild less than 50% proximal bilateral ICA stenosis. When seen, patient reports no recurrence of the event. Patient has no recollection of the event. Per son, they were eating and then patient started to get confused and was talking nonsensically. No loss of consciousness. Per patient she has seen Dr. Pk Todd (neurology) for intermittent diplopia. Question of left 6th nerve paresis. Prior head CT and brain MRI have been done with no clear pathology. Neuromuscular junction laboratory work-up was also ordered which was reported to be negative. Patient is supposed to undergo EMG/NCS for further evaluation. Outside reports reviewed: ER records, radiology reports, lab reports. Review of Systems:    Pertinent items are noted in HPI. Objective:     Patient Vitals for the past 8 hrs:   BP Temp Pulse Resp SpO2   11/29/21 0840 123/65 98.1 °F (36.7 °C) 79 16 95 %   11/29/21 0340 121/60 97.9 °F (36.6 °C) 72 16 94 %   11/29/21 0145 126/68 98 °F (36.7 °C) 84 17 96 %     PHYSICAL EXAM:    NEUROLOGICAL EXAM:    Appearance: The patient is well developed, well nourished, provides a coherent history and is in no acute distress.    Mental Status: Oriented to time, place and person. Fluent, no aphasia or dysarthria. Tends to perseverate. Mood and affect appropriate. Cranial Nerves:   Intact visual fields. MONET, EOM's full except weak left lateral gaze, no nystagmus, no ptosis. Facial sensation is normal. Corneal reflexes are intact. Facial movement is symmetric. Hearing is normal bilaterally. Palate is midline with normal elevation. Sternocleidomastoid and trapezius muscles are normal. Tongue is midline. Motor:  5/5 strength in upper and lower proximal and distal muscles. Normal bulk and tone. No pronator drift. Reflexes:   Deep tendon reflexes 2+/4 and symmetrical. Downgoing toes. Sensory:   Normal to cold. Gait:  Steady gait. No Romberg. Tremor:   No tremor noted. Cerebellar:  Intact FTN/CHRISSY/HTS.        Imaging  CT Head, head/neck CTA, brain MRI: reviewed    Lab Review    Recent Results (from the past 24 hour(s))   GLUCOSE, POC    Collection Time: 11/28/21  3:36 PM   Result Value Ref Range    Glucose (POC) 88 65 - 117 mg/dL    Performed by Lee Chinchilla    EKG, 12 LEAD, INITIAL    Collection Time: 11/28/21  3:53 PM   Result Value Ref Range    Ventricular Rate 72 BPM    Atrial Rate 72 BPM    P-R Interval 120 ms    QRS Duration 90 ms    Q-T Interval 398 ms    QTC Calculation (Bezet) 435 ms    Calculated P Axis 31 degrees    Calculated R Axis -36 degrees    Calculated T Axis 64 degrees    Diagnosis       Normal sinus rhythm  Left axis deviation  Left ventricular hypertrophy with repolarization abnormality ( R in aVL ,   Jeremiah product )  Abnormal ECG  No previous ECGs available     CBC WITH AUTOMATED DIFF    Collection Time: 11/28/21  3:56 PM   Result Value Ref Range    WBC 9.8 3.6 - 11.0 K/uL    RBC 4.76 3.80 - 5.20 M/uL    HGB 14.5 11.5 - 16.0 g/dL    HCT 45.1 35.0 - 47.0 %    MCV 94.7 80.0 - 99.0 FL    MCH 30.5 26.0 - 34.0 PG    MCHC 32.2 30.0 - 36.5 g/dL    RDW 13.4 11.5 - 14.5 %    PLATELET 964 527 - 774 K/uL    MPV 11.4 8.9 - 12.9 FL    NRBC 0.0 0.0  WBC ABSOLUTE NRBC 0.00 0.00 - 0.01 K/uL    NEUTROPHILS 48 32 - 75 %    LYMPHOCYTES 41 12 - 49 %    MONOCYTES 9 5 - 13 %    EOSINOPHILS 2 0 - 7 %    BASOPHILS 1 0 - 1 %    IMMATURE GRANULOCYTES 0 0 - 0.5 %    ABS. NEUTROPHILS 4.7 1.8 - 8.0 K/UL    ABS. LYMPHOCYTES 4.0 (H) 0.8 - 3.5 K/UL    ABS. MONOCYTES 0.8 0.0 - 1.0 K/UL    ABS. EOSINOPHILS 0.2 0.0 - 0.4 K/UL    ABS. BASOPHILS 0.1 0.0 - 0.1 K/UL    ABS. IMM. GRANS. 0.0 0.00 - 0.04 K/UL    DF AUTOMATED     METABOLIC PANEL, COMPREHENSIVE    Collection Time: 11/28/21  3:56 PM   Result Value Ref Range    Sodium 142 136 - 145 mmol/L    Potassium 3.7 3.5 - 5.1 mmol/L    Chloride 106 97 - 108 mmol/L    CO2 26 21 - 32 mmol/L    Anion gap 10 5 - 15 mmol/L    Glucose 109 (H) 65 - 100 mg/dL    BUN 12 6 - 20 MG/DL    Creatinine 0.92 0.55 - 1.02 MG/DL    BUN/Creatinine ratio 13 12 - 20      GFR est AA >60 >60 ml/min/1.73m2    GFR est non-AA 60 (L) >60 ml/min/1.73m2    Calcium 9.3 8.5 - 10.1 MG/DL    Bilirubin, total 0.4 0.2 - 1.0 MG/DL    ALT (SGPT) 28 12 - 78 U/L    AST (SGOT) 26 15 - 37 U/L    Alk.  phosphatase 53 45 - 117 U/L    Protein, total 7.4 6.4 - 8.2 g/dL    Albumin 3.9 3.5 - 5.0 g/dL    Globulin 3.5 2.0 - 4.0 g/dL    A-G Ratio 1.1 1.1 - 2.2     PROTHROMBIN TIME + INR    Collection Time: 11/28/21  3:56 PM   Result Value Ref Range    INR 1.0 0.9 - 1.1      Prothrombin time 10.4 9.0 - 11.1 sec   URINALYSIS W/MICROSCOPIC    Collection Time: 11/28/21  4:48 PM   Result Value Ref Range    Color YELLOW/STRAW      Appearance CLEAR CLEAR      Specific gravity 1.016 1.003 - 1.030      pH (UA) 6.5 5.0 - 8.0      Protein Negative NEG mg/dL    Glucose Negative NEG mg/dL    Ketone Negative NEG mg/dL    Bilirubin Negative NEG      Blood SMALL (A) NEG      Urobilinogen 0.2 0.2 - 1.0 EU/dL    Nitrites Negative NEG      Leukocyte Esterase Negative NEG      WBC 0-4 0 - 4 /hpf    RBC 0-5 0 - 5 /hpf    Epithelial cells FEW FEW /lpf    Bacteria Negative NEG /hpf   URINE CULTURE HOLD SAMPLE Collection Time: 11/28/21  4:48 PM    Specimen: Serum; Urine   Result Value Ref Range    Urine culture hold        Urine on hold in Microbiology dept for 2 days. If unpreserved urine is submitted, it cannot be used for addtional testing after 24 hours, recollection will be required. TROPONIN-HIGH SENSITIVITY    Collection Time: 11/28/21 10:12 PM   Result Value Ref Range    Troponin-High Sensitivity 41 0 - 51 ng/L   HEMOGLOBIN A1C WITH EAG    Collection Time: 11/29/21  2:00 AM   Result Value Ref Range    Hemoglobin A1c 5.4 4.0 - 5.6 %    Est. average glucose 108 mg/dL   TSH 3RD GENERATION    Collection Time: 11/29/21  2:00 AM   Result Value Ref Range    TSH 1.80 0.36 - 3.74 uIU/mL   MAGNESIUM    Collection Time: 11/29/21  2:00 AM   Result Value Ref Range    Magnesium 2.3 1.6 - 2.4 mg/dL   PHOSPHORUS    Collection Time: 11/29/21  2:00 AM   Result Value Ref Range    Phosphorus 3.0 2.6 - 4.7 MG/DL   METABOLIC PANEL, COMPREHENSIVE    Collection Time: 11/29/21  2:00 AM   Result Value Ref Range    Sodium 140 136 - 145 mmol/L    Potassium 3.7 3.5 - 5.1 mmol/L    Chloride 109 (H) 97 - 108 mmol/L    CO2 24 21 - 32 mmol/L    Anion gap 7 5 - 15 mmol/L    Glucose 109 (H) 65 - 100 mg/dL    BUN 11 6 - 20 MG/DL    Creatinine 0.76 0.55 - 1.02 MG/DL    BUN/Creatinine ratio 14 12 - 20      GFR est AA >60 >60 ml/min/1.73m2    GFR est non-AA >60 >60 ml/min/1.73m2    Calcium 8.8 8.5 - 10.1 MG/DL    Bilirubin, total 0.3 0.2 - 1.0 MG/DL    ALT (SGPT) 25 12 - 78 U/L    AST (SGOT) 24 15 - 37 U/L    Alk.  phosphatase 46 45 - 117 U/L    Protein, total 6.6 6.4 - 8.2 g/dL    Albumin 3.4 (L) 3.5 - 5.0 g/dL    Globulin 3.2 2.0 - 4.0 g/dL    A-G Ratio 1.1 1.1 - 2.2     C REACTIVE PROTEIN, QT    Collection Time: 11/29/21  2:00 AM   Result Value Ref Range    C-Reactive protein <0.29 0.00 - 0.60 mg/dL   FOLATE    Collection Time: 11/29/21  2:00 AM   Result Value Ref Range    Folate 9.3 5.0 - 21.0 ng/mL   TROPONIN-HIGH SENSITIVITY    Collection Time: 11/29/21  2:00 AM   Result Value Ref Range    Troponin-High Sensitivity 35 0 - 51 ng/L   CBC WITH AUTOMATED DIFF    Collection Time: 11/29/21  2:00 AM   Result Value Ref Range    WBC 14.7 (H) 3.6 - 11.0 K/uL    RBC 4.58 3.80 - 5.20 M/uL    HGB 13.9 11.5 - 16.0 g/dL    HCT 42.1 35.0 - 47.0 %    MCV 91.9 80.0 - 99.0 FL    MCH 30.3 26.0 - 34.0 PG    MCHC 33.0 30.0 - 36.5 g/dL    RDW 13.6 11.5 - 14.5 %    PLATELET 666 512 - 531 K/uL    MPV 11.6 8.9 - 12.9 FL    NRBC 0.0 0  WBC    ABSOLUTE NRBC 0.00 0.00 - 0.01 K/uL    NEUTROPHILS 85 (H) 32 - 75 %    LYMPHOCYTES 10 (L) 12 - 49 %    MONOCYTES 5 5 - 13 %    EOSINOPHILS 0 0 - 7 %    BASOPHILS 0 0 - 1 %    IMMATURE GRANULOCYTES 0 0.0 - 0.5 %    ABS. NEUTROPHILS 12.4 (H) 1.8 - 8.0 K/UL    ABS. LYMPHOCYTES 1.5 0.8 - 3.5 K/UL    ABS. MONOCYTES 0.7 0.0 - 1.0 K/UL    ABS. EOSINOPHILS 0.0 0.0 - 0.4 K/UL    ABS. BASOPHILS 0.1 0.0 - 0.1 K/UL    ABS. IMM. GRANS. 0.1 (H) 0.00 - 0.04 K/UL    DF AUTOMATED     D DIMER    Collection Time: 11/29/21  2:00 AM   Result Value Ref Range    D-dimer 1.57 (H) 0.00 - 0.65 mg/L FEU   LIPID PANEL    Collection Time: 11/29/21  2:00 AM   Result Value Ref Range    Cholesterol, total 161 <200 MG/DL    Triglyceride 103 <150 MG/DL    HDL Cholesterol 60 MG/DL    LDL, calculated 80.4 0 - 100 MG/DL    VLDL, calculated 20.6 MG/DL    CHOL/HDL Ratio 2.7 0.0 - 5.0     TROPONIN-HIGH SENSITIVITY    Collection Time: 11/29/21  5:18 AM   Result Value Ref Range    Troponin-High Sensitivity 27 0 - 51 ng/L         Assessment:   Transient alteration of awareness  Hypertensive encephalopathy    Plan:   Neurological examination is nonfocal.  Event is more consistent with encephalopathy likely secondary to hypertension. Need to assess for possible seizures. Not typical for TIA or stroke. Head CT without contrast did not reveal any acute process. Mild periventricular white matter disease.   Brain MRI without contrast did not reveal any acute intracranial abnormality. 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 Head and neck CTA did not reveal any flow-limiting stenosis or aneurysm. Less than 50% bilateral ICA stenosis. No intervention necessary. EEG was ordered and is pending. LDL was slightly elevated and should be less than 70 given history of prior stroke. Continue Crestor and dietary changes. Continue aspirin and Plavix for stroke prophylaxis. Medication to change home regimen. Recommend aggressive treatment of her blood pressure. Continue care with primary neurologist Dr. Evelin Brunner. Further intervention be done pending results of EEG. If unremarkable then patient can be discharged. Thank you for the consult. This note was created using voice recognition software. Despite editing, there may be syntax errors.

## 2021-11-29 NOTE — PROGRESS NOTES
Reason for Admission:  Altered mental status/confusion. Hx - HTN, hypothyroidism, CAD with stent, right thigh Sarcoma. COVID19 Vaccine:  yes       Type: Moderna + booster                      RUR Score:   Observation/ low risk                  Plan for utilizing home health:  Unsure at this time, no prior home health services. No DME. PCP: First and Last name:  Juana Tate MD     Name of Practice:    Are you a current patient: Yes/No:  yes   Approximate date of last visit:   unsure   Can you participate in a virtual visit with your PCP:   no                    Current Advanced Directive/Advance Care Plan: Full Code    Healthcare Decision Maker:   Click here to complete [address] including selection of the [address] Relationship (ie \"Primary\")      Son Adalberto [phone]  Daughter Kimberly Huerta [phone]               Transition of Care Plan:     Chart reviewed, demographics verified. CM role and follow up discussed. Met with patient's son at bedside, face mask on. Patient lives with her son Magan Fox. Patient lives in a two story home with 5 steps to enter home and 13 steps to upper level. Patient has prescription drug coverage, uses [employer] [facility]. Patient is independent, drives, and provides self care. Patient performs ADLs independently. Current status:  Patient currently requiring medical management including ongoing assessment and monitoring. Speech therapy evaluation completed, no recommendations. Await PT/OT evaluations. Cardiology consulted. PLAN:  1. Monitor patient response to treatment and recommendations. 2. Medical management continues. 3. Await PT/OT evaluations. 4. Patient transport per son at discharge. 5. CM to monitor clinical progress and disposition recommendations. Care Management Interventions  PCP Verified by CM: Yes (Dr. Holland Roman)  Mode of Transport at Discharge:  Other (see comment) (per son Vic Renee)  Transition of Care Consult (CM Consult): Discharge Planning  Physical Therapy Consult: Yes  Occupational Therapy Consult: Yes  Speech Therapy Consult: Yes  Support Systems: Child(mayi)  Confirm Follow Up Transport: Family  The Plan for Transition of Care is Related to the Following Treatment Goals : return home at [facility] Location  Discharge Placement: Home with family assistance    Jessica Ly RN, MSN, Care manager

## 2021-11-29 NOTE — PROGRESS NOTES
TRANSFER - IN REPORT:    Verbal report received from [facility],Paul 3, RN on Kaylen Coleman  being received from 4th floor for change in patient condition(.)      Report consisted of patients Situation, Background, Assessment and   Recommendations(SBAR). Information from the following report(s) SBAR, Kardex, Intake/Output, MAR, Recent Results, Med Rec Status and Cardiac Rhythm . was reviewed with the receiving nurse. Opportunity for questions and clarification was provided. Assessment completed upon patients arrival to unit and care assumed. 0145 - Patient has history of dysphagia so NPO until speech can see her.    0600 - Critical troponin received from lab. MD notified. Stroke Education provided to patient and the following topics were discussed    1. Patients personal risk factors for stroke are hypertension    2. Warning signs of Stroke:        * Sudden numbness or weakness of the face, arm or leg, especially on one side of          The body            * Sudden confusion, trouble speaking or understanding        * Sudden trouble seeing in one or both eyes        * Sudden trouble walking, dizziness, loss of balance or coordination        * Sudden severe headache with no known cause      3. Importance of activation Emergency Medical Services ( 9-1-1 ) immediately if experience any warning signs of stroke. 4. Be sure and schedule a follow-up appointment with your primary care doctor or any specialists as instructed. 5. You must take medicine every day to treat your risk factors for stroke. Be sure to take your medicines exactly as your doctor tells you: no more, no less. Know what your medicines are for , what they do. Anti-thrombotics /anticoagulants can help prevent strokes. You are taking the following medicine(s) Heparin    6. Smoking and second-hand smoke greatly increase your risk of stroke, cardiovascular disease and death. Smoking history never    7.  Information provided was [facility] Stroke Education Binder, Stroke Handouts or Verbal Education    8. Documentation of teaching completed in Patient Education Activity and on Care Plan with teaching response noted? yes    This patient was assisted with intentional toileting every 2 hours during this shift as appropriate. Documentation of ambulation and output reflected on flow sheet as appropriate. Purposeful hourly rounding was completed using AIDET and 5 Ps. Outcomes of PHR documented as they occurred. Bed alarm in use as appropriate. Dual suction and ambubag in place. 0700 - Bedside and verbal shift change report given to April, RN (oncoming nurse) by [facility], RN (offgoing nurse). Report included the following information: SBAR, Kardex, Intake/Output, MAR, Recent Results, and Med Rec Status.

## 2021-11-29 NOTE — PROGRESS NOTES
0730 Bedside and Verbal shift change report given to April RN (oncoming nurse) by Edwardo Garrison RN (offgoing nurse). Report included the following information SBAR and Kardex. This patient was assisted with Intentional Toileting every 2 hours during this shift as appropriate. Documentation of ambulation and output reflected on Flowsheet as appropriate. Purposeful hourly rounding was completed using AIDET and 5Ps. Outcomes of PHR documented as they occurred. Bed alarm in use as appropriate. Dual Suction and ambubag in place. 1930 Bedside and Verbal shift change report given to Loren Diaz and Sameera Morgan RN (oncoming nurse) by April RN (offgoing nurse). Report included the following information SBAR and Kardex.

## 2021-11-29 NOTE — PROGRESS NOTES
Problem: Self Care Deficits Care Plan (Adult)  Goal: *Acute Goals and Plan of Care (Insert Text)  Description:   FUNCTIONAL STATUS PRIOR TO ADMISSION: Patient was independent and active without use of DME. Patient was independent for basic and instrumental ADLs. HOME SUPPORT: The patient lived with son but did not require assist.    Occupational Therapy Goals  Initiated 11/29/2021  1. Patient will perform grooming, standing at sink for 5 minutes, with modified independence within 7 day(s). 2.  Patient will perform lower body dressing with modified independence within 7 day(s). 3.  Patient will perform bathing, sitting and standing PRN, with supervision/set-up within 7 day(s). 4.  Patient will perform toilet transfers with modified independence within 7 day(s). 5.  Patient will perform all aspects of toileting with modified independence within 7 day(s). 6.  Patient will participate in upper extremity therapeutic exercise/activities with modified independence for 10 minutes within 7 day(s). Outcome: Progressing Towards Goal     OCCUPATIONAL THERAPY EVALUATION  Patient: Delfina Ozuna (65 y.o. female)  Date: 11/29/2021  Primary Diagnosis: Altered mental state [R41.82]       Precautions:  Fall    ASSESSMENT  Based on the objective data described below, the patient presents with poor short term memory, decreased attention, intermittent confusion, and impaired balance following admission for AMS. CT negative and MRI pending at time of evaluation. Pt today received in bed with supportive son present, is alert and oriented, but continues to report no recollection of events leading up to admission. She is provided 3 words to recall at end of session, and she was unable to recall them after >15 minutes even with cues and options provided. Pt requires hand-held assist during mobility to bathroom and completes ADLs with up to CGA/min A.  Pt also tangential during conversation and perseverating on BP meds and HTN, requiring redirection to task at hand throughout session. At this time, pt is functioning below her baseline and is unsafe to return home without 24/7 assistance/supervision. Current Level of Function Impacting Discharge (ADLs/self-care): up to min A for ADLs; CGA for OOB mobility/ADL transfers    Functional Outcome Measure: The patient scored Total A-D  Total A-D (Motor Function): 64/66 on the Fugl-Lopez Assessment which is indicative of mild impairment in upper extremity functional status. Other factors to consider for discharge: h/o R foot drop; lives with son     Patient will benefit from skilled therapy intervention to address the above noted impairments. PLAN :  Recommendations and Planned Interventions: self care training, functional mobility training, therapeutic exercise, balance training, visual/perceptual training, therapeutic activities, cognitive retraining, endurance activities, neuromuscular re-education, patient education, home safety training and family training/education    Frequency/Duration: Patient will be followed by occupational therapy 5 times a week to address goals. Recommendation for discharge: (in order for the patient to meet his/her long term goals)  Occupational therapy at least 2 days/week in the home AND ensure assist and/or supervision for safety with all ADLs and mobility    This discharge recommendation:  Has been made in collaboration with the attending provider and/or case management    IF patient discharges home will need the following DME: L[facility]       SUBJECTIVE:   Patient stated I don't remember. 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    Hypertension     Ill-defined condition     increased cholesterol    Ill-defined condition     osteoporosis    Ill-defined condition dysphagia    Ill-defined condition     hiatal hernia    Thyroid disease      Past Surgical History:   Procedure Laterality Date    COLONOSCOPY N/A 8/30/2016    COLON[facility] performed [facility]le Longo MD at [facility] ENDOSCOPY     [address]      x2    HX CHOLECYSTECTOMY      HX COLONOSCOPY      2009, 2016    HX DILATION AND CURETTAGE      HX GI      colonoscopy    HX HEART CATHETERIZATION      stents/has a significant blockage that is being watched    HX HEENT      thyroidectomy    HX HYSTERECTOMY      HX ORTHOPAEDIC      anterior muscle removed d/t 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 - has foot drop now    HX TONSILLECTOMY      CT CHEST SURGERY PROCEDURE UNLISTED      part of right lung removed for nodule - ?path       Expanded or extensive additional review of patient history:     Home Situation  Home Environment: Private residence  # Steps to Enter: 4  Rails to Enter: Yes  Hand Rails : Right  One/Two Story Residence: Two story  # of Interior Steps: 12  Living Alone: No  Support Systems: Child(mayi)  Patient Expects to be Discharged to[de-identified] [employer]  Current DME Used/Available at Home: None    Hand dominance: Right    EXAMINATION OF PERFORMANCE DEFICITS:  Cognitive/Behavioral Status:  Neurologic State: Alert; Confused  Orientation Level: Oriented X4  Cognition: Memory loss; Decreased attention/concentration; Follows commands  Perception: Appears intact  Perseveration: Perseverates during conversation (on blood pressure medication)  Safety/Judgement: Awareness of environment; Insight into deficits    Hearing:   Auditory  Auditory Impairment: None    Vision/Perceptual:    Tracking: Able to track stimulus in all quadrants w/o difficulty    Diplopia: No    Corrective Lenses: Glasses    Range of Motion:  AROM: Within functional limits  PROM: Within functional limits    Strength:  Strength: Generally decreased, functional    Coordination:  Coordination: Generally decreased, functional  Fine Motor Skills-Upper: Left Intact; Right Intact (generally slowed)    Gross Motor Skills-Upper: Left Intact; Right Intact    Tone & Sensation:  Tone: Normal  Sensation: Intact    Balance:  Sitting: Intact  Standing: Impaired  Standing - Static: Fair  Standing - Dynamic : Fair    Functional Mobility and Transfers for ADLs:  Bed Mobility:  Supine to Sit: Supervision  Sit to Supine: Supervision  Scooting: Independent    Transfers:  Sit to Stand: Contact guard assistance  Stand to Sit: Contact guard assistance  Bed to Chair: Contact guard assistance  Bathroom Mobility: Contact guard assistance; Minimum assistance (without AD)  Toilet Transfer : Contact guard assistance    ADL Assessment:  Feeding: Independent    Oral Facial Hygiene/Grooming: Stand-by assistance; Contact guard assistance (for standing tasks)    Bathing: Minimum assistance    Upper Body Dressing: Independent    Lower Body Dressing: Minimum assistance    Toileting: Contact guard assistance; Minimum assistance    ADL Intervention and task modifications:  Grooming  Grooming Assistance: Stand-by assistance; Contact guard assistance  Position Performed: Standing (at sink)  Washing Face: Contact guard assistance  Washing Hands: Contact guard assistance  Cues: Verbal cues provided    Lower Body Dressing Assistance  Socks: Set-up  Leg Crossed Method Used: Yes  Position Performed: Seated edge of bed  Cues: [place]  Toileting Assistance: Contact guard assistance  Bladder Hygiene: Stand-by assistance (seated)  Clothing Management: Contact guard assistance  Adaptive Equipment: Grab bars    Cognitive Retraining  Safety/Judgement: Awareness of environment;  Insight into deficits    Functional Measure:  Fugl-Lopez Assessment of Motor Recovery after Stroke:   Reflex Activity  Flexors/Biceps/Fingers: Can be elicited  Extensors/Triceps: Can be elicited  Reflex Subtotal: 4    Volitional Movement Within Synergies  Shoulder Retraction: Full  Shoulder Elevation: Full  Shoulder Abduction (90 degrees): Full  Shoulder External Rotation: Full  Elbow Flexion: Full  Forearm Supination: Full  Shoulder Adduction/Internal Rotation: Full  Elbow Extension: Full  Forearm Pronation: Full  Subtotal: 18    Volitional Movement Mixing Synergies  Hand to Lumbar Spine: Full  Shoulder Flexion (0-90 degrees): Full  Pronation-Supination: Full  Subtotal: 6    Volitional Movement With Little or No Synergy  Shoulder Abduction (0-90 degrees): Full  Shoulder Flexion ( degrees): Full  Pronation/Supination: Full  Subtotal : 6    Normal Reflex Activity  Biceps, Triceps, Finger Flexors: Full  Subtotal : 2    Upper Extremity Total   Upper Extremity Total: 36    Wrist  Stability at 15 Degree Dorsiflexion: Full  Repeated Dorsiflexion/ Volar Flexion: Full  Stability at 15 Degree Dorsiflexion: Full  Repeated Dorsiflexion/ Volar Flexion: Full  Circumduction: Partial  Wrist Total: 9    Hand  Mass Flexion: Full  Mass Extension: Full  Grasp A: Full  Grasp B: Full  Grasp C: Full  Grasp D: Full  Grasp E: Full  Hand Total: 14    Coordination/Speed  Tremor: None  Dysmetria: Slight  Time: <1s  Coordination/Speed Total : 5    Total A-D  Total A-D (Motor Function): 64/66     This is a reliable/valid measure of arm function after a neurological event. It has established value to characterize functional status and for measuring spontaneous and therapy-induced recovery; tests proximal and distal motor functions. Fugl-Lopez Assessment  UE scores recorded between five and 30 days post neurologic event can be used to predict UE recovery at six months post neurologic event. Severe = 0-21 points   Moderately Severe = 22-33 points   Moderate = 34-47 points   Mild = 48-66 points  YANNICK Lawrence, MARY Brooke, & VALENTINE Santiago (1992). Measurement of motor recovery after stroke: Outcome assessment and sample size requirements. Stroke, 23, pp. 0897-8881. ------------------------------------------------------------------------------------------------------------------------------------------------------------------  MCID:  Stroke:   Manuel Robbins et al, 2001; n = 171; mean age 79 (6) years; assessed within 16 (12) days of stroke, Acute Stroke)  FMA Motor Scores from Admission to Discharge    10 point increase in FMA Upper Extremity = 1.5 change in discharge FIM    10 point increase in FMA Lower Extremity = 1.9 change in discharge FIM  MDC:   Stroke:   Juan Pablo Rodriguez et al, 2008, n = 14, mean age = 59.9 (14.6) years, assessed on average 14 (6.5) months post stroke, Chronic Stroke)    FMA = 5.2 points for the Upper Extremity portion of the assessment     Occupational Therapy Evaluation Charge Determination   History Examination Decision-Making   LOW Complexity : Brief history review  MEDIUM Complexity : 3-5 performance deficits relating to physical, cognitive , or psychosocial skils that result in activity limitations and / or participation restrictions MEDIUM Complexity : Patient may present with comorbidities that affect occupational performnce. Miniml to moderate modification of tasks or assistance (eg, physical or verbal ) with assesment(s) is necessary to enable patient to complete evaluation       Based on the above components, the patient evaluation is determined to be of the following complexity level: LOW   Pain Rating:  Pt reporting minimal pain    Activity Tolerance:   Fair, SpO2 stable on RA and BP stable    After treatment patient left in no apparent distress:    Call bell within reach, Bed / chair alarm activated, Caregiver / family present and Side rails x 3    COMMUNICATION/EDUCATION:   The patients plan of care was discussed with: Physical therapist and Registered nurse. Patient and/or family was verbally educated on the BE FAST acronym for signs/symptoms of CVA and TIA. Informed patient to refer to the Stroke Binder for further BE FAST information.  All questions answered with patient indicating good understanding. Home safety education was provided and the patient/caregiver indicated understanding., Patient/family have participated as able in goal setting and plan of care. and Patient/family agree to work toward stated goals and plan of care. This patients plan of care is appropriate for delegation to LIVE.     Thank you for this referral.  Sia Dawson OT  Time Calculation: 41 mins

## 2021-11-29 NOTE — CONSULTS
Kim Pacheco MD    [address] [facility] [place], [address] [facility] [address]    Office [phone],[facility] [phone]      Date of  Admission: 11/28/2021  3:30 PM  PCP- Janae May MD    Shefali Elder is a 66 y.o. female admitted for Altered mental state [R41.82]. Consult requested by Valerie Rowley MD    Assessment/Plan    Episode of chest pain: prior to admission, currently asymptomatic, trop flat. Hx of GERD with similar smx, not compliant with H2B  - ECHO and LE duplex pending  - BNP add on   - CXR to eval for other sources: 1. No evidence of congestive cardiac failure. 2. Presence of linear scarring involving the right upper lobe. - Consider CTA chest given elevated d-dimer and further eval for other etiology     CAD and PAD: stents by Dr Jered Sanabria and Racquel Moya  - Studies as above   - GDMT, pt is on BB, ACEi PTA  - On Plavix, ASA and statin   - Request records [facility]    HTN:   - stable on PTA meds     CVA/TIA rule out: per neuro  Hyperlipidemia: on statin  Hypothyroidism: per primary   Leukocytosis: per primary  Hematuria: per primary       I appreciate the opportunity to be involved in Ms. Nair. See below note for details. Please do not hesitate to contact us with questions or concerns. Patient discussed with Dr Saadia Brito (cardiology attending)    Peter Bajwa MD    Cardiac Testing/ Procedures: A. Cardiac Cath/PCI: none    B.ECHO/PABLO: pending    C.StressNuclear/Stress ECHO/Stress test: none    D. Vascular: none    E. EP: none    F. Miscellaneous:    Care Plan discussed with: Patient, Consultant/Specialist and >50% of time spent in counseling and coordination of care    Subjective: This 67 yo female with full PMH  as below and remarkable for CAD with stents  presented yesterday for evaluation of confusion, stroke was ruled out in the ED and patient was admitted for further evaluation to the hospitalist service.  Patient was complaining of intermittent chest pain on day of admission per H&P, but is asymptomatic now. Reports previous work up revealing hiatal hernia and rx for H2B that she stopped taking in summer after diet changes, but usually had acid reflux smx similar to her cp symptoms prior to stent. She follows with Dr Juve Guaman and Mac Lazaro in [facility] for CAD and PAD, s/p coronary and peripheral stents. Today denies fever, chills, chest pain, shortness of breath, numbness, increased headaches, sore throat, ear pain, cough, sick contacts, n/v, abd pain, dysuria, hematuria, slurred speech, facial droop, confusion or unilateral weakness. 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  Other Plant, Animal, Environmental Other (comments)     Mite and grass allergies.      Family History   Problem Relation Age of Onset    Stroke Mother hemorrhagic stroke    Coronary Art Dis Mother     Hypertension Mother     Other Mother         hydrocephaleus/had shunt    Stroke Father     Coronary Art Dis Father     Heart Surgery Father     Other Father         subdural hematoma    Other Other         cousin \"never woke up after surgery\" - pt unaware of circumstance    Cancer Other         Distant relative with colon cancer    Cancer Paternal Aunt         colon      Social History     Tobacco Use    Smoking status: Never Smoker    Smokeless tobacco: Never Used   Substance Use Topics    Alcohol use: No    Drug use: Not on file          Medications:  Medications Prior to Admission   Medication Sig    metoprolol tartrate (LOPRESSOR) 25 mg tablet Take 25 mg by mouth two (2) times a day.  zoledronic acid (RECLAST) 5 mg/100 mL pgbk 5 mg by IntraVENous route once.  clopidogrel (PLAVIX) 75 mg tab Take 75 mg by mouth.  losartan (COZAAR) 25 mg tablet Take  by mouth daily.  ASPIRIN PO Take 81 mg by mouth two (2) times a day.  alendronate (FOSAMAX) 70 mg tablet Take 70 mg by mouth every seven (7) days. Takes on Sunday.  rosuvastatin (CRESTOR) 10 mg tablet Take 10 mg by mouth daily.  levothyroxine (SYNTHROID) 75 mcg tablet Take 75 mcg by mouth six (6) days a week. Does not take on Sundays.  cholecalciferol (VITAMIN D3) 1,000 unit tablet Take  by mouth. Takes 2000 units occasionally.  OTHER Takes one eye health and lutein vitamin and mineral supplement po occasionally.  MULTIVITAMIN WITH MINERALS (HAIR,SKIN AND NAILS PO) Take  by mouth. Takes one po occasionally.      Current Facility-Administered Medications   Medication Dose Route Frequency    clopidogreL (PLAVIX) tablet 75 mg  75 mg Oral DAILY    aspirin chewable tablet 81 mg  81 mg Oral BID    levothyroxine (SYNTHROID) tablet 75 mcg  75 mcg Oral Once per day on Mon Tue Wed Thu Fri Sat    losartan (COZAAR) tablet 25 mg  25 mg Oral DAILY    metoprolol tartrate (LOPRESSOR) tablet 25 mg  25 mg Oral BID    rosuvastatin (CRESTOR) tablet 10 mg  10 mg Oral DAILY    acetaminophen (TYLENOL) tablet 650 mg  650 mg Oral Q4H PRN    Or    acetaminophen (TYLENOL) solution 650 mg  650 mg Per NG tube Q4H PRN    Or    acetaminophen (TYLENOL) suppository 650 mg  650 mg Rectal Q4H PRN    ondansetron (ZOFRAN) injection 4 mg  4 mg IntraVENous Q6H PRN    bisacodyL (DULCOLAX) tablet 5 mg  5 mg Oral DAILY PRN    heparin (porcine) injection 5,000 Units  5,000 Units SubCUTAneous Q8H    hydrALAZINE (APRESOLINE) 20 mg/mL injection 10 mg  10 mg IntraVENous Q6H PRN    lactated Ringers infusion  75 mL/hr IntraVENous CONTINUOUS         Review of Systems:  (bold if positive, if negative)    As in HPI - rest of ROS noncontributory        Physical Exam:  Visit Vitals  /65 (BP 1 Location: Left upper arm, BP Patient Position: At rest)   Pulse 79   Temp 98.1 °F (36.7 °C)   Resp 16   Ht 5' 3\" (1.6 m)   Wt 132 lb 15 oz (60.3 kg)   SpO2 95%   BMI 23.55 kg/m²         Telemetry: normal sinus rhythm    Gen: Well-developed, well-nourished, in no acute distress  HEENT:  Pink conjunctivae, hearing intact to voice, moist mucous membranes  Neck: Supple,No JVD, No Carotid Bruit, Thyroid- non tender  Resp: No accessory muscle use, Clear breath sounds, No rales or rhonchi  Card: Regular Rate,Rythm,Normal S1, S2, No murmurs, rubs or gallop. No thrills. Abd:  Soft, non-tender, non-distended, normoactive bowel sounds are present,   MSK: No cyanosis or clubbing  Skin: No rashes or ulcers  Neuro:  moving all four extremities, no focal deficit, follows commands appropriately  Psych:  Good insight, oriented to person, place and time, alert, Nml Affect  LE: No edema   Vascular:Radial  Pulses 2+ and symmetric        EKG: there are no previous tracings available for comparison, normal sinus rhythm, nonspecific ST and T waves changes, left axis deviation, LVH, normal intervals. Cxray: 1.  No evidence of congestive cardiac failure. 2. Presence of linear scarring involving the right upper lobe.     LABS:      Lab Results   Component Value Date/Time    WBC 14.7 (H) 11/29/2021 02:00 AM    HGB 13.9 11/29/2021 02:00 AM    HCT 42.1 11/29/2021 02:00 AM    PLATELET 675 45/31/7976 02:00 AM    MCV 91.9 11/29/2021 02:00 AM     Lab Results   Component Value Date/Time    Sodium 140 11/29/2021 02:00 AM    Potassium 3.7 11/29/2021 02:00 AM    Chloride 109 (H) 11/29/2021 02:00 AM    CO2 24 11/29/2021 02:00 AM    Anion gap 7 11/29/2021 02:00 AM    Glucose 109 (H) 11/29/2021 02:00 AM    BUN 11 11/29/2021 02:00 AM    Creatinine 0.76 11/29/2021 02:00 AM    BUN/Creatinine ratio 14 11/29/2021 02:00 AM    GFR est AA >60 11/29/2021 02:00 AM    GFR est non-AA >60 11/29/2021 02:00 AM    Calcium 8.8 11/29/2021 02:00 AM     No results found for: CPK, RCK1, RCK2, RCK3, RCK4, CKNDX, CKND1, TROPT, TROIQ, BNPP, BNP  No results found for: APTT  Lab Results   Component Value Date/Time    INR 1.0 11/28/2021 03:56 PM    Prothrombin time 10.4 11/28/2021 03:56 PM     No results found for: BNP, BNPP, XBNPT      Lucas Story MD

## 2021-11-30 ENCOUNTER — APPOINTMENT (OUTPATIENT)
Dept: NON INVASIVE DIAGNOSTICS | Age: 71
End: 2021-11-30
Attending: INTERNAL MEDICINE
Payer: MEDICARE

## 2021-11-30 VITALS
BODY MASS INDEX: 24.53 KG/M2 | DIASTOLIC BLOOD PRESSURE: 62 MMHG | OXYGEN SATURATION: 94 % | TEMPERATURE: 97.2 F | RESPIRATION RATE: 18 BRPM | WEIGHT: 138.45 LBS | HEART RATE: 64 BPM | SYSTOLIC BLOOD PRESSURE: 147 MMHG | HEIGHT: 63 IN

## 2021-11-30 LAB
ECHO AO ASC DIAM: 3.14 CM
ECHO AV ANNULUS DIAM: 2.89 CM
ECHO AV AREA PEAK VELOCITY: 1.49 CM2
ECHO AV AREA/BSA PEAK VELOCITY: 0.9 CM2/M2
ECHO AV PEAK GRADIENT: 5.19 MMHG
ECHO AV PEAK VELOCITY: 113.72 CM/S
ECHO LA AREA 4C: 8.13 CM2
ECHO LA MAJOR AXIS: 2.85 CM
ECHO LA MINOR AXIS: 1.73 CM
ECHO LA VOL 2C: 23.99 ML (ref 22–52)
ECHO LA VOL 4C: 13.49 ML (ref 22–52)
ECHO LA VOL BP: 20.61 ML (ref 22–52)
ECHO LA VOL/BSA BIPLANE: 12.49 ML/M2 (ref 16–28)
ECHO LA VOLUME INDEX A2C: 14.54 ML/M2 (ref 16–28)
ECHO LA VOLUME INDEX A4C: 8.18 ML/M2 (ref 16–28)
ECHO LV E' LATERAL VELOCITY: 6.69 CM/S
ECHO LV E' SEPTAL VELOCITY: 7.18 CM/S
ECHO LV INTERNAL DIMENSION DIASTOLIC: 3.92 CM (ref 3.9–5.3)
ECHO LV INTERNAL DIMENSION SYSTOLIC: 2.28 CM
ECHO LV IVSD: 0.68 CM (ref 0.6–0.9)
ECHO LV MASS 2D: 70.9 G (ref 67–162)
ECHO LV MASS INDEX 2D: 43 G/M2 (ref 43–95)
ECHO LV POSTERIOR WALL DIASTOLIC: 0.65 CM (ref 0.6–0.9)
ECHO LVOT DIAM: 1.73 CM
ECHO LVOT PEAK GRADIENT: 2.1 MMHG
ECHO LVOT PEAK VELOCITY: 72.46 CM/S
ECHO MV A VELOCITY: 43.34 CM/S
ECHO MV E DECELERATION TIME (DT): 161.91 MS
ECHO MV E VELOCITY: 68.44 CM/S
ECHO MV E/A RATIO: 1.58
ECHO MV E/E' LATERAL: 10.23
ECHO MV E/E' RATIO (AVERAGED): 9.88
ECHO MV E/E' SEPTAL: 9.53
ECHO PV MAX VELOCITY: 98.47 CM/S
ECHO PV PEAK INSTANTANEOUS GRADIENT SYSTOLIC: 3.88 MMHG
ECHO RV INTERNAL DIMENSION: 2.73 CM
ECHO RV TAPSE: 1.05 CM (ref 1.5–2)
LA VOL DISK BP: 18.56 ML (ref 22–52)

## 2021-11-30 PROCEDURE — 96374 THER/PROPH/DIAG INJ IV PUSH: CPT

## 2021-11-30 PROCEDURE — 74011250636 HC RX REV CODE- 250/636: Performed by: INTERNAL MEDICINE

## 2021-11-30 PROCEDURE — 96372 THER/PROPH/DIAG INJ SC/IM: CPT

## 2021-11-30 PROCEDURE — G0378 HOSPITAL OBSERVATION PER HR: HCPCS

## 2021-11-30 PROCEDURE — 99231 SBSQ HOSP IP/OBS SF/LOW 25: CPT | Performed by: PSYCHIATRY & NEUROLOGY

## 2021-11-30 PROCEDURE — 93306 TTE W/DOPPLER COMPLETE: CPT | Performed by: STUDENT IN AN ORGANIZED HEALTH CARE EDUCATION/TRAINING PROGRAM

## 2021-11-30 PROCEDURE — 74011250637 HC RX REV CODE- 250/637: Performed by: INTERNAL MEDICINE

## 2021-11-30 RX ADMIN — CLOPIDOGREL BISULFATE 75 MG: 75 TABLET ORAL at 09:19

## 2021-11-30 RX ADMIN — LEVOTHYROXINE SODIUM 50 MCG: 0.05 TABLET ORAL at 06:27

## 2021-11-30 RX ADMIN — LOSARTAN POTASSIUM 25 MG: 25 TABLET, FILM COATED ORAL at 09:19

## 2021-11-30 RX ADMIN — ASPIRIN 81 MG: 81 TABLET, CHEWABLE ORAL at 09:19

## 2021-11-30 RX ADMIN — ROSUVASTATIN CALCIUM 10 MG: 10 TABLET, COATED ORAL at 09:19

## 2021-11-30 RX ADMIN — HEPARIN SODIUM 5000 UNITS: 5000 INJECTION INTRAVENOUS; SUBCUTANEOUS at 06:27

## 2021-11-30 NOTE — PROGRESS NOTES
Problem: Falls - Risk of  Goal: *Absence of Falls  Description: Document [place] Fall Risk and appropriate interventions in the flowsheet.   Outcome: Progressing Towards Goal  Note: Fall Risk Interventions:  Mobility Interventions: Bed/chair exit alarm    Mentation Interventions: Bed/chair exit alarm         Elimination Interventions: Bed/chair exit alarm, Call light in reach              Problem: Patient Education: Go to Patient Education Activity  Goal: Patient/Family Education  Outcome: Progressing Towards Goal     Problem: Pain  Goal: *Control of Pain  Outcome: Progressing Towards Goal     Problem: Patient Education: Go to Patient Education Activity  Goal: Patient/Family Education  Outcome: Progressing Towards Goal     Problem: Patient Education: Go to Patient Education Activity  Goal: Patient/Family Education  Outcome: Progressing Towards Goal     Problem: TIA/CVA Stroke: 0-24 hours  Goal: Off Pathway (Use only if patient is Off Pathway)  Outcome: Progressing Towards Goal  Goal: Activity/Safety  Outcome: Progressing Towards Goal  Goal: Consults, if ordered  Outcome: Progressing Towards Goal  Goal: Diagnostic Test/Procedures  Outcome: Progressing Towards Goal  Goal: Nutrition/Diet  Outcome: Progressing Towards Goal  Goal: Discharge Planning  Outcome: Progressing Towards Goal  Goal: Medications  Outcome: Progressing Towards Goal  Goal: Respiratory  Outcome: Progressing Towards Goal  Goal: Treatments/Interventions/Procedures  Outcome: Progressing Towards Goal  Goal: Minimize risk of bleeding post-thrombolytic infusion  Outcome: Progressing Towards Goal  Goal: Monitor for complications post-thrombolytic infusion  Outcome: Progressing Towards Goal  Goal: Psychosocial  Outcome: Progressing Towards Goal  Goal: *Hemodynamically stable  Outcome: Progressing Towards Goal  Goal: *Neurologically stable  Description: Absence of additional neurological deficits    Outcome: Progressing Towards Goal  Goal: *Verbalizes anxiety and depression are reduced or absent  Outcome: Progressing Towards Goal  Goal: *Absence of Signs of Aspiration on Current Diet  Outcome: Progressing Towards Goal  Goal: *Absence of deep venous thrombosis signs and symptoms(Stroke Metric)  Outcome: Progressing Towards Goal  Goal: *Ability to perform ADLs and demonstrates progressive mobility and function  Outcome: Progressing Towards Goal  Goal: *Stroke education started(Stroke Metric)  Outcome: Progressing Towards Goal  Goal: *Dysphagia screen performed(Stroke Metric)  Outcome: Progressing Towards Goal  Goal: *Rehab consulted(Stroke Metric)  Outcome: Progressing Towards Goal     Problem: TIA/CVA Stroke: Day 2 Until Discharge  Goal: Off Pathway (Use only if patient is Off Pathway)  Outcome: Progressing Towards Goal  Goal: Activity/Safety  Outcome: Progressing Towards Goal  Goal: Diagnostic Test/Procedures  Outcome: Progressing Towards Goal  Goal: Nutrition/Diet  Outcome: Progressing Towards Goal  Goal: Discharge Planning  Outcome: Progressing Towards Goal  Goal: Medications  Outcome: Progressing Towards Goal  Goal: Respiratory  Outcome: Progressing Towards Goal  Goal: Treatments/Interventions/Procedures  Outcome: Progressing Towards Goal  Goal: Psychosocial  Outcome: Progressing Towards Goal  Goal: *Verbalizes anxiety and depression are reduced or absent  Outcome: Progressing Towards Goal  Goal: *Absence of aspiration  Outcome: Progressing Towards Goal  Goal: *Absence of deep venous thrombosis signs and symptoms(Stroke Metric)  Outcome: Progressing Towards Goal  Goal: *Optimal pain control at patient's stated goal  Outcome: Progressing Towards Goal  Goal: *Tolerating diet  Outcome: Progressing Towards Goal  Goal: *Ability to perform ADLs and demonstrates progressive mobility and function  Outcome: Progressing Towards Goal  Goal: *Stroke education continued(Stroke Metric)  Outcome: Progressing Towards Goal     Problem: Ischemic Stroke: Discharge Outcomes  Goal: *Verbalizes anxiety and depression are reduced or absent  Outcome: Progressing Towards Goal  Goal: *Verbalize understanding of risk factor modification(Stroke Metric)  Outcome: Progressing Towards Goal  Goal: *Hemodynamically stable  Outcome: Progressing Towards Goal  Goal: *Absence of aspiration pneumonia  Outcome: Progressing Towards Goal  Goal: *Aware of needed dietary changes  Outcome: Progressing Towards Goal  Goal: *Verbalize understanding of prescribed medications including anti-coagulants, anti-lipid, and/or anti-platelets(Stroke Metric)  Outcome: Progressing Towards Goal  Goal: *Tolerating diet  Outcome: Progressing Towards Goal  Goal: *Aware of follow-up diagnostics related to anticoagulants  Outcome: Progressing Towards Goal  Goal: *Ability to perform ADLs and demonstrates progressive mobility and function  Outcome: Progressing Towards Goal  Goal: *Absence of DVT(Stroke Metric)  Outcome: Progressing Towards Goal  Goal: *Absence of aspiration  Outcome: Progressing Towards Goal  Goal: *Optimal pain control at patient's stated goal  Outcome: Progressing Towards Goal  Goal: *Home safety concerns addressed  Outcome: Progressing Towards Goal  Goal: *Describes available resources and support systems  Outcome: Progressing Towards Goal  Goal: *Verbalizes understanding of activation of EMS(911) for stroke symptoms(Stroke Metric)  Outcome: Progressing Towards Goal  Goal: *Understands and describes signs and symptoms to report to providers(Stroke Metric)  Outcome: Progressing Towards Goal  Goal: *Neurolgocially stable (absence of additional neurological deficits)  Outcome: Progressing Towards Goal  Goal: *Verbalizes importance of follow-up with primary care physician(Stroke Metric)  Outcome: Progressing Towards Goal  Goal: *Smoking cessation discussed,if applicable(Stroke Metric)  Outcome: Progressing Towards Goal  Goal: *Depression screening completed(Stroke Metric)  Outcome: Progressing Towards Goal     Problem: Patient Education: Go to Patient Education Activity  Goal: Patient/Family Education  Outcome: Progressing Towards Goal     Problem: Patient Education: Go to Patient Education Activity  Goal: Patient/Family Education  Outcome: Progressing Towards Goal

## 2021-11-30 NOTE — DISCHARGE INSTRUCTIONS
Diet as before  Activity slowly advance as tolerated to levels before  No driving until cleared by neurology as outpatient  Get outpatient echocardiogram at cardiology's office  Check TSH/CBC/mag at PCPs office in 1 to 2 weeks  Return to ER or call PCP immediately if symptoms recur or get worse

## 2021-11-30 NOTE — DISCHARGE SUMMARY
Hospitalist Discharge Summary     Patient ID:  Sumi Smith  [id number]  9 y.o.  1950 11/28/2021    PCP on record: Med Cerda MD    Admit date: 11/28/2021  Discharge date and time: 11/30/2021    DISCHARGE DIAGNOSIS:  1. Acute memory loss suspected encephalopathy versus transient global amnesia  2. Hypertension. 3.  Dyslipidemia. 4.  Hypothyroidism. 5.  Chest pain.       CONSULTATIONS:  IP CONSULT TO NEUROLOGY  IP CONSULT TO CARDIOLOGY    Excerpted HPI from H&P of Pati Sidhu DO:  CHIEF COMPLAINT:  Confusion.     HISTORY OF PRESENT ILLNESS:  This is a 77-year-old woman with a past medical history significant for hypertension; dyslipidemia; hypothyroidism; coronary artery disease, status post stent placement; sarcoma of the right thigh, status post resection, was in her usual state of health until the day of presentation at the emergency room when the patient developed confusion. The patient was having lunch with her son when all of the sudden she became confused and she did not know where she was. It was reported that the patient was staring at the jamie. No prior episode of TIA or CVA. The patient was taken to  [facility] Emergency Room at [facility] for further evaluation. When the patient arrived at the emergency room, code stroke level 1 was called. CT scan of the head was obtained, which did not show any acute pathology. CTA of the head and neck was then performed, which also did not show any significant abnormality. The patient was subsequently referred to the hospitalist service for evaluation for admission. She was seen by Neurology through the Tel[facility] Service at the request of the emergency room physician. No record of prior admission to this hospital.  There was no history of fever, rigors, or chills.   No history of seizure disorder.     According to the patient's son, the patient had chest pain earlier during the day, which the patient thought was gas pain. The chest pain has since subsided. ______________________________________________________________________  DISCHARGE SUMMARY/HOSPITAL COURSE:  for full details see H&P, daily progress notes, labs, consult notes. 1.  Acute memory loss no TIA/CVA per neurology. MRI of the brain and EEG normal  F/u echocardiogram to be done as outpatient at cardiologist office  LDL 80   J57 and folic acid level 12726 Grace Santos  Neurology consulted okay to go home if EEG negative  Consider EEG  Cont asa/plavix/statins  PT/OT  On case    2. HTN  Cont  home meds f/u PCP  3. Dyslipidemia. on Crestor  4. Hypothyroidism. Synthroid. TSH level ok  5. CAD s/p stent with  CP ce neg. On asa, Plavix, and bb. Echo pend Cardiology saw patient but no intervention. Follow-up with primary cardiologist as she has an appointment for repeat echo  next month  6.  DD 1.57   PVL neg and clinically not consistent with PE.      ADFC  DVT PRx heparin  NOK    Dispo: home today         _______________________________________________________________________  Patient seen and examined by me on discharge day. Can maximize inpatient benefit stay, patient has no chest pain today no shortness of breath no pleurisy. Patient medically stable for discharge with close follow-up with primary neurologist and primary cardiologist as outpatient. Patient verbalized understanding of discharge plan and instructions at this time. _______________________________________________________________________  DISCHARGE MEDICATIONS:   Current Discharge Medication List      CONTINUE these medications which have NOT CHANGED    Details   levothyroxine (SYNTHROID) 50 mcg tablet Take 50 mcg by mouth Daily (before breakfast). clonazePAM (KlonoPIN) 0.5 mg tablet Take 0.25 mg by mouth nightly as needed for Anxiety. famotidine (Pepcid) 20 mg tablet Take 40 mg by mouth daily.       metoprolol tartrate (LOPRESSOR) 25 mg tablet Take 25 mg by mouth two (2) times a day. sucralfate (Carafate) 1 gram tablet Take 1 g by mouth four (4) times daily. Indications: heartburn      zoledronic acid (RECLAST) 5 mg/100 mL pgbk 5 mg by IntraVENous route once. clopidogrel (PLAVIX) 75 mg tab Take 75 mg by mouth.      losartan (COZAAR) 25 mg tablet Take 25 mg by mouth daily. ASPIRIN PO Take 81 mg by mouth two (2) times a day. rosuvastatin (CRESTOR) 10 mg tablet Take 10 mg by mouth daily. cholecalciferol (VITAMIN D3) 1,000 unit tablet Take  by mouth. Takes 2000 units occasionally.                Patient Follow Up Instructions:     Diet as before  Activity slowly advance as tolerated to levels before  No driving until cleared by neurology as outpatient  Get outpatient echocardiogram at cardiology's office  Check TSH/CBC/mag at PCPs office in 1 to 2 weeks  Return to ER or call PCP immediately if symptoms recur or get worse  Follow-up Information     Follow up With Specialties Details Why Contact Info    eJred Rayo MD Gastroenterology  1-2 weeks check cbc/bmp/TSH levels [address] [facility] [address] [facility] [address] [facility]      Linda Claros MD Neurology  2 weeks for memory loss and further testing [address] [place] [address]  [address] [place] [address],[address]  [address]       Carmen Rios MD Cardiology  for Chest pain and Echo [address]  [phone]          ________________________________________________________________    Risk of deterioration: Low    Condition at Discharge:  Stable  __________________________________________________________________    Disposition  Home with family, no needs    ____________________________________________________________________    Code Status: Full Code  ___________________________________________________________________      Total time in minutes spent coordinating this discharge  31 minutes    Signed:  Walker Moses MD .

## 2021-11-30 NOTE — PROGRESS NOTES
0730 Bedside and Verbal shift change report given to April RN (oncoming nurse) by Toi Dunbar RN (offgoing nurse). Report included the following information SBAR and Kardex. This patient was assisted with Intentional Toileting every 2 hours during this shift as appropriate. Documentation of ambulation and output reflected on Flowsheet as appropriate. Purposeful hourly rounding was completed using AIDET and 5Ps. Outcomes of PHR documented as they occurred. Bed alarm in use as appropriate. Dual Suction and ambubag in place. 0552 Patient HR 52-57 this am, currently 59, spoke with Juve Bettencourt NP cardiology, will hold am metoprolol. Bladimir Lindquist NP gave parameters on metoprolol to hold at home added to patient discharge. 2333 Patient and son reviewed discharge instructions. Verbalized understanding, no questions. Unable to [facility] discharge, signed paper copy and copy placed in chart.

## 2021-11-30 NOTE — PROGRESS NOTES
In preparation for discharge, I have completed AVS Med up-dates, Education and Care Plans. Case Management consulted for home health. Will continue to monitor discharge process.

## 2021-11-30 NOTE — ROUTINE PROCESS
Bedside and Verbal shift change report given to April (oncoming nurse) by Martha Duran (offgoing nurse). Report included the following information SBAR, Kardex, ED Summary, Procedure Summary, Intake/Output, MAR, Recent Results and Cardiac Rhythm NSR.

## 2021-11-30 NOTE — PROGRESS NOTES
Hospitalist Progress Note    NAME: Eve Wei   :  1950   MRN:  [id number]     Subjective:   Daily Progress Note: 2021 7:47 AM      Chief complaint: memory loss   Patient seen and examined, chart was reviewed. EEG was negative, MRI brain was negative, PVL showed no DVT. Echo ordered but pending. Patient claims she is supposed to have an outpatient echocardiogram done next month at her primary cardiologist office. Answered all questions to patient's best satisfaction. Patient still have some memory gaps prior to admission. Otherwise AAOx3 now.     Current Facility-Administered Medications   Medication Dose Route Frequency    clopidogreL (PLAVIX) tablet 75 mg  75 mg Oral DAILY    aspirin chewable tablet 81 mg  81 mg Oral BID    levothyroxine (SYNTHROID) tablet 50 mcg  50 mcg Oral 7am    losartan (COZAAR) tablet 25 mg  25 mg Oral DAILY    metoprolol tartrate (LOPRESSOR) tablet 25 mg  25 mg Oral BID    rosuvastatin (CRESTOR) tablet 10 mg  10 mg Oral DAILY    acetaminophen (TYLENOL) tablet 650 mg  650 mg Oral Q4H PRN    Or    acetaminophen (TYLENOL) solution 650 mg  650 mg Per NG tube Q4H PRN    Or    acetaminophen (TYLENOL) suppository 650 mg  650 mg Rectal Q4H PRN    ondansetron (ZOFRAN) injection 4 mg  4 mg IntraVENous Q6H PRN    bisacodyL (DULCOLAX) tablet 5 mg  5 mg Oral DAILY PRN    heparin (porcine) injection 5,000 Units  5,000 Units SubCUTAneous Q8H    hydrALAZINE (APRESOLINE) 20 mg/mL injection 10 mg  10 mg IntraVENous Q6H PRN    lactated Ringers infusion  75 mL/hr IntraVENous CONTINUOUS          Objective:     Visit Vitals  /62 (BP 1 Location: Left upper arm, BP Patient Position: At rest)   Pulse (!) 52   Temp 98 °F (36.7 °C)   Resp 12   Ht 5' 3\" (1.6 m)   Wt 62.8 kg (138 lb 8 oz)   SpO2 94%   BMI 24.53 kg/m²      O2 Device: None (Room air)    Temp (24hrs), Av °F (36.7 °C), Min:97.4 °F (36.3 °C), Max:98.3 °F (36.8 °C)        PHYSICAL EXAM:  Gen WDWN  Neck supple  CVS RRR  Resp symmetric expansion  Abd soft ND  Ext moves all  Neuro ALert normal speech  Psych normal affect        Data Review    No results found for this or any previous visit (from the past 24 hour(s)). No results found for this visit on 11/28/21. All Micro Results     Procedure Component Value Units Date/Time    URINE CULTURE HOLD SAMPLE [[id number]] Collected: 11/28/21 4595    Order Status: Completed Specimen: Urine from Serum Updated: 11/28/21 1651     Urine culture hold       Urine on hold in Microbiology dept for 2 days. If unpreserved urine is submitted, it cannot be used for addtional testing after 24 hours, recollection will be required. CTA Head:  1. No evidence of significant stenosis or aneurysm.     CTA Neck:  1. No evidence of flow-limiting stenosis. 2. Mild stenosis (less than 50% by NASCET criteria) of the proximal bilateral  internal carotid arteries. MRI brain IMPRESSION     1. No acute intracranial abnormality. 2. 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 Assessment/Plan:         Problem list  1. Suspected encephalopathy versus transient global amnesia  2. Hypertension. 3.  Dyslipidemia. 4.  Hypothyroidism. 5.  Chest pain.        1. Acute memory loss no TIA/CVA per neurology. MRI of the brain and EEG normal  F/u echocardiogram to be done as outpatient at cardiologist office  LDL 80   U27 and folic acid level Melania Lincoln  Neurology consulted okay to go home if EEG negative  Consider EEG  Cont asa/plavix/statins  PT/OT  On case    2. HTN  Cont  home meds f/u PCP  3. Dyslipidemia. on Crestor  4. Hypothyroidism. Synthroid. TSH level ok  5. CAD s/p stent with  CP ce neg. On asa, Plavix, and bb. Echo pend Cardiology saw patient but no intervention.   Follow-up with primary cardiologist as she has an appointment for repeat echo  next month  6.  DD 1.57   PVL neg and clinically not consistent with PE.     ADFC  DVT PRx heparin  NOK    Dispo: home today     Signed By: Connor Hastings MD     November 30, 2021

## 2021-11-30 NOTE — PROGRESS NOTES
Order received for home health nursing, PT, OT. Home health follow up discussed with patient. Home health provider list given to patient. Choice is [facility] home health or [facility], referral sent via [employer]. [place] of Choice obtained. Await response. Patient ACCEPTED by [facility], [facility] updated. Care Management Interventions  PCP Verified by CM: Yes (Dr. Myrtle Longo)  Mode of Transport at Discharge:  Other (see comment) (per son Harsha Patricio)  Transition of Care Consult (CM Consult): [address]: No  Reason Outside [place]:  (Patient choice)  Physical Therapy Consult: Yes  Occupational Therapy Consult: Yes  Speech Therapy Consult: Yes  Support Systems: Child(mayi)  Confirm Follow Up Transport: Family  The Plan for Transition of Care is Related to the Following Treatment Goals : return home at DC  The Patient and/or Patient Representative was Provided with a Choice of Provider and Agrees with the Discharge Plan?: (S) Yes  Freedom of Choice List was Provided with Basic Dialogue that Supports the Patient's Individualized Plan of Care/Goals, Treatment Preferences and Shares the Quality Data Associated with the Providers?: (S) Yes  Discharge Location  Discharge Placement: Home with home health    Cami Daly, RN, MSN/Care manager

## 2021-11-30 NOTE — PROGRESS NOTES
Neurology Progress Note    Patient ID:  Gypsy Stanton  [id number]  66 y.o.  1950    CC: altered mental status    Subjective:      Patient and son reports no recurrence. EEG done yesterday revealed normal awake and sleep patterns. No seizures. Brain MRI without contrast did not reveal any acute stroke. 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 Labs done today revealed increased proBNP. Lower extremity ultrasound did not reveal any DVT. Echocardiogram is pending. Current Facility-Administered Medications   Medication Dose Route Frequency    clopidogreL (PLAVIX) tablet 75 mg  75 mg Oral DAILY    aspirin chewable tablet 81 mg  81 mg Oral BID    levothyroxine (SYNTHROID) tablet 50 mcg  50 mcg Oral 7am    losartan (COZAAR) tablet 25 mg  25 mg Oral DAILY    metoprolol tartrate (LOPRESSOR) tablet 25 mg  25 mg Oral BID    rosuvastatin (CRESTOR) tablet 10 mg  10 mg Oral DAILY    acetaminophen (TYLENOL) tablet 650 mg  650 mg Oral Q4H PRN    Or    acetaminophen (TYLENOL) solution 650 mg  650 mg Per NG tube Q4H PRN    Or    acetaminophen (TYLENOL) suppository 650 mg  650 mg Rectal Q4H PRN    ondansetron (ZOFRAN) injection 4 mg  4 mg IntraVENous Q6H PRN    bisacodyL (DULCOLAX) tablet 5 mg  5 mg Oral DAILY PRN    heparin (porcine) injection 5,000 Units  5,000 Units SubCUTAneous Q8H    hydrALAZINE (APRESOLINE) 20 mg/mL injection 10 mg  10 mg IntraVENous Q6H PRN    lactated Ringers infusion  75 mL/hr IntraVENous CONTINUOUS        Review of Systems:    Pertinent items are noted in HPI.     Objective:     Patient Vitals for the past 8 hrs:   BP Temp Pulse Resp SpO2 Height Weight   11/30/21 0919   64       11/30/21 0917 (!) 147/62     5' 3\" (1.6 m) 62.8 kg (138 lb 7.2 oz)   11/30/21 0823 (!) 147/62 97.2 °F (36.2 °C) (!) 57 18 94 %     11/30/21 0450 118/62 98 °F (36.7 °C) (!) 52 12 94 %         11/30 0701 - 11/30 1900  In: 240 [P.O.:240]  Out: 950 [Urine:950]  11/28 1901 - 11/30 0700  In: 2708.8 [P.O.:780; I.V.:1928.8]  Out: 250 [Urine:250]    Lab Review   Recent Results (from the past 24 hour(s))   ECHO ADULT COMPLETE    Collection Time: 11/30/21  9:44 AM   Result Value Ref Range    IVSd 0.68 0.6 - 0.9 cm    LVIDd 3.92 3.9 - 5.3 cm    LVIDs 2.28 cm    LVOT d 1.73 cm    LVPWd 0.65 0.6 - 0.9 cm    LVOT Peak Gradient 2.10 mmHg    LVOT Peak Velocity 72.46 cm/s    RVIDd 2.73 cm    Left Atrium Major Axis 2.85 cm    LA Volume 20.61 22 - 52 mL    LA Area 4C 8.13 cm2    LA Vol 2C 23.99 22 - 52 mL    LA Vol 4C 13.49 (A) 22 - 52 mL    LA Volume DISK BP 18.56 22 - 52 mL    AV Annulus 2.89 cm    Aortic Valve Area by Continuity of Peak Velocity 1.49 cm2    AoV PG 5.19 mmHg    Aortic Valve Systolic Peak Velocity 929.78 cm/s    MV A Ray 43.34 cm/s    Mitral Valve E Wave Deceleration Time 161.91 ms    MV E Ray 68.44 cm/s    E/E' ratio (averaged) 9.88     E/E' lateral 10.23     E/E' septal 9.53     LV E' Lateral Velocity 6.69 cm/s    LV E' Septal Velocity 7.18 cm/s    Pulmonic Valve Systolic Peak Instantaneous Gradient 3.88 mmHg    Pulmonic Valve Max Velocity 98.47 cm/s    Tapse 1.05 (A) 1.5 - 2.0 cm    AO ASC D 3.14 cm    MV E/A 1.58     LV Mass AL 70.9 67 - 162 g    LV Mass AL Index 43.0 43 - 95 g/m2    Left Atrium Minor Axis 1.73 cm    LA Vol Index 12.49 16 - 28 ml/m2    LA Vol Index 14.54 16 - 28 ml/m2    LA Vol Index 8.18 16 - 28 ml/m2    MICHELLE/BSA Pk Ray 0.9 cm2/m2     NEUROLOGICAL EXAM:     Appearance: The patient is well developed, well nourished, provides a coherent history and is in no acute distress. Mental Status: Oriented to time, place and person. Fluent, no aphasia or dysarthria. Mood and affect appropriate. Cranial Nerves:   Intact visual fields. MONET, EOM's full except weak left lateral gaze, no nystagmus, no ptosis. Facial sensation is normal. Corneal reflexes are intact. Facial movement is symmetric. Hearing is normal bilaterally. Palate is midline with normal elevation.  Sternocleidomastoid and trapezius muscles are normal. Tongue is midline. Motor:  5/5 strength in upper and lower proximal and distal muscles. Normal bulk and tone. No pronator drift. Reflexes:   Deep tendon reflexes 2+/4 and symmetrical. Downgoing toes. Sensory:   Normal to cold. Gait:  Steady gait. No Romberg. Tremor:   No tremor noted. Cerebellar:  Intact FTN/CHRISSY/HTS. Assessment:   Transient alteration of awareness  Hypertensive encephalopathy    Plan:   Neurological examination is nonfocal.  Event is more consistent with encephalopathy likely secondary to hypertension. Need to assess for possible seizures. Not typical for TIA or stroke.     Head CT without contrast did not reveal any acute process. Mild periventricular white matter disease. Brain MRI without contrast did not reveal any acute intracranial abnormality. 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     Head and neck CTA did not reveal any flow-limiting stenosis or aneurysm. Less than 50% bilateral ICA stenosis. No intervention necessary.     EEG was normal. No seizures.      LDL was slightly elevated and should be less than 70 given history of prior stroke. Continue Crestor and dietary changes.     Continue aspirin and Plavix for stroke prophylaxis. Medication to change home regimen.     Recommend aggressive treatment of her blood pressure.     Continue care with primary neurologist Dr. Evelin Brunner. No further recommendations from a neurological standpoint.       Signed:  Vicky Major MD  11/30/2021  12:44 PM

## 2022-03-18 PROBLEM — I63.9 ACUTE CVA (CEREBROVASCULAR ACCIDENT) (HCC): Status: ACTIVE | Noted: 2021-11-28

## 2022-03-19 PROBLEM — R41.82 ALTERED MENTAL STATE: Status: ACTIVE | Noted: 2021-11-28

## 2022-06-24 ENCOUNTER — APPOINTMENT (OUTPATIENT)
Dept: GENERAL RADIOLOGY | Age: 72
DRG: 481 | End: 2022-06-24
Attending: NURSE PRACTITIONER
Payer: MEDICARE

## 2022-06-24 ENCOUNTER — APPOINTMENT (OUTPATIENT)
Dept: GENERAL RADIOLOGY | Age: 72
DRG: 481 | End: 2022-06-24
Attending: STUDENT IN AN ORGANIZED HEALTH CARE EDUCATION/TRAINING PROGRAM
Payer: MEDICARE

## 2022-06-24 ENCOUNTER — ANESTHESIA EVENT (OUTPATIENT)
Dept: SURGERY | Age: 72
DRG: 481 | End: 2022-06-24
Payer: MEDICARE

## 2022-06-24 ENCOUNTER — ANESTHESIA (OUTPATIENT)
Dept: SURGERY | Age: 72
DRG: 481 | End: 2022-06-24
Payer: MEDICARE

## 2022-06-24 ENCOUNTER — APPOINTMENT (OUTPATIENT)
Dept: CT IMAGING | Age: 72
DRG: 481 | End: 2022-06-24
Attending: NURSE PRACTITIONER
Payer: MEDICARE

## 2022-06-24 ENCOUNTER — HOSPITAL ENCOUNTER (INPATIENT)
Age: 72
LOS: 5 days | Discharge: NURSING, REHAB OR LONG-TERM CARE | DRG: 481 | End: 2022-06-29
Attending: EMERGENCY MEDICINE | Admitting: INTERNAL MEDICINE
Payer: MEDICARE

## 2022-06-24 DIAGNOSIS — Z01.810 PRE-OPERATIVE CARDIOVASCULAR EXAMINATION: ICD-10-CM

## 2022-06-24 DIAGNOSIS — I25.10 CORONARY ARTERY DISEASE INVOLVING NATIVE CORONARY ARTERY OF NATIVE HEART WITHOUT ANGINA PECTORIS: ICD-10-CM

## 2022-06-24 DIAGNOSIS — S72.092A OTHER CLOSED FRACTURE OF HEAD OR NECK OF LEFT FEMUR, INITIAL ENCOUNTER (HCC): Primary | ICD-10-CM

## 2022-06-24 PROBLEM — I63.9 ACUTE CVA (CEREBROVASCULAR ACCIDENT) (HCC): Status: RESOLVED | Noted: 2021-11-28 | Resolved: 2022-06-24

## 2022-06-24 PROBLEM — Z86.73 HISTORY OF CVA (CEREBROVASCULAR ACCIDENT): Status: ACTIVE | Noted: 2022-06-24

## 2022-06-24 PROBLEM — R41.82 ALTERED MENTAL STATE: Status: RESOLVED | Noted: 2021-11-28 | Resolved: 2022-06-24

## 2022-06-24 PROBLEM — S72.90XA FEMUR FRACTURE (HCC): Status: ACTIVE | Noted: 2022-06-24

## 2022-06-24 LAB
ABO + RH BLD: NORMAL
ALBUMIN SERPL-MCNC: 3.5 G/DL (ref 3.5–5)
ALBUMIN/GLOB SERPL: 1.3 {RATIO} (ref 1.1–2.2)
ALP SERPL-CCNC: 52 U/L (ref 45–117)
ALT SERPL-CCNC: 34 U/L (ref 12–78)
ANION GAP SERPL CALC-SCNC: 8 MMOL/L (ref 5–15)
APPEARANCE UR: CLEAR
AST SERPL-CCNC: 34 U/L (ref 15–37)
ATRIAL RATE: 59 BPM
BACTERIA URNS QL MICRO: NEGATIVE /HPF
BASOPHILS # BLD: 0 K/UL (ref 0–0.1)
BASOPHILS NFR BLD: 0 % (ref 0–1)
BILIRUB SERPL-MCNC: 0.4 MG/DL (ref 0.2–1)
BILIRUB UR QL: NEGATIVE
BLOOD GROUP ANTIBODIES SERPL: NORMAL
BUN SERPL-MCNC: 17 MG/DL (ref 6–20)
BUN/CREAT SERPL: 21 (ref 12–20)
CALCIUM SERPL-MCNC: 9.1 MG/DL (ref 8.5–10.1)
CALCULATED P AXIS, ECG09: 51 DEGREES
CALCULATED R AXIS, ECG10: -39 DEGREES
CALCULATED T AXIS, ECG11: 27 DEGREES
CHLORIDE SERPL-SCNC: 107 MMOL/L (ref 97–108)
CO2 SERPL-SCNC: 26 MMOL/L (ref 21–32)
COLOR UR: ABNORMAL
COMMENT, HOLDF: NORMAL
CREAT SERPL-MCNC: 0.81 MG/DL (ref 0.55–1.02)
DIAGNOSIS, 93000: NORMAL
DIFFERENTIAL METHOD BLD: ABNORMAL
EOSINOPHIL # BLD: 0 K/UL (ref 0–0.4)
EOSINOPHIL NFR BLD: 0 % (ref 0–7)
EPITH CASTS URNS QL MICRO: ABNORMAL /LPF
ERYTHROCYTE [DISTWIDTH] IN BLOOD BY AUTOMATED COUNT: 13.1 % (ref 11.5–14.5)
GLOBULIN SER CALC-MCNC: 2.8 G/DL (ref 2–4)
GLUCOSE SERPL-MCNC: 97 MG/DL (ref 65–100)
GLUCOSE UR STRIP.AUTO-MCNC: NEGATIVE MG/DL
HCT VFR BLD AUTO: 42.7 % (ref 35–47)
HGB BLD-MCNC: 13.9 G/DL (ref 11.5–16)
HGB UR QL STRIP: ABNORMAL
IMM GRANULOCYTES # BLD AUTO: 0.1 K/UL (ref 0–0.04)
IMM GRANULOCYTES NFR BLD AUTO: 1 % (ref 0–0.5)
INR PPP: 1 (ref 0.9–1.1)
KETONES UR QL STRIP.AUTO: NEGATIVE MG/DL
LEUKOCYTE ESTERASE UR QL STRIP.AUTO: NEGATIVE
LYMPHOCYTES # BLD: 1.1 K/UL (ref 0.8–3.5)
LYMPHOCYTES NFR BLD: 9 % (ref 12–49)
MCH RBC QN AUTO: 31.5 PG (ref 26–34)
MCHC RBC AUTO-ENTMCNC: 32.6 G/DL (ref 30–36.5)
MCV RBC AUTO: 96.8 FL (ref 80–99)
MONOCYTES # BLD: 0.7 K/UL (ref 0–1)
MONOCYTES NFR BLD: 6 % (ref 5–13)
NEUTS SEG # BLD: 10.3 K/UL (ref 1.8–8)
NEUTS SEG NFR BLD: 84 % (ref 32–75)
NITRITE UR QL STRIP.AUTO: NEGATIVE
NRBC # BLD: 0.02 K/UL (ref 0–0.01)
NRBC BLD-RTO: 0.2 PER 100 WBC
P-R INTERVAL, ECG05: 160 MS
PH UR STRIP: 6.5 [PH] (ref 5–8)
PLATELET # BLD AUTO: 230 K/UL (ref 150–400)
PMV BLD AUTO: 11.5 FL (ref 8.9–12.9)
POTASSIUM SERPL-SCNC: 4.5 MMOL/L (ref 3.5–5.1)
PROT SERPL-MCNC: 6.3 G/DL (ref 6.4–8.2)
PROT UR STRIP-MCNC: NEGATIVE MG/DL
PROTHROMBIN TIME: 10.9 SEC (ref 9–11.1)
Q-T INTERVAL, ECG07: 406 MS
QRS DURATION, ECG06: 92 MS
QTC CALCULATION (BEZET), ECG08: 401 MS
RBC # BLD AUTO: 4.41 M/UL (ref 3.8–5.2)
RBC #/AREA URNS HPF: ABNORMAL /HPF (ref 0–5)
SAMPLES BEING HELD,HOLD: NORMAL
SODIUM SERPL-SCNC: 141 MMOL/L (ref 136–145)
SP GR UR REFRACTOMETRY: 1.01 (ref 1–1.03)
SPECIMEN EXP DATE BLD: NORMAL
UA: UC IF INDICATED,UAUC: ABNORMAL
UR CULT HOLD, URHOLD: NORMAL
UROBILINOGEN UR QL STRIP.AUTO: 0.2 EU/DL (ref 0.2–1)
VENTRICULAR RATE, ECG03: 59 BPM
WBC # BLD AUTO: 12.2 K/UL (ref 3.6–11)
WBC URNS QL MICRO: ABNORMAL /HPF (ref 0–4)

## 2022-06-24 PROCEDURE — 77030020788: Performed by: STUDENT IN AN ORGANIZED HEALTH CARE EDUCATION/TRAINING PROGRAM

## 2022-06-24 PROCEDURE — 85610 PROTHROMBIN TIME: CPT

## 2022-06-24 PROCEDURE — 74011000250 HC RX REV CODE- 250: Performed by: INTERNAL MEDICINE

## 2022-06-24 PROCEDURE — 74011250636 HC RX REV CODE- 250/636: Performed by: ANESTHESIOLOGY

## 2022-06-24 PROCEDURE — 74011250636 HC RX REV CODE- 250/636: Performed by: NURSE PRACTITIONER

## 2022-06-24 PROCEDURE — 73502 X-RAY EXAM HIP UNI 2-3 VIEWS: CPT

## 2022-06-24 PROCEDURE — 2709999900 HC NON-CHARGEABLE SUPPLY: Performed by: STUDENT IN AN ORGANIZED HEALTH CARE EDUCATION/TRAINING PROGRAM

## 2022-06-24 PROCEDURE — 77030002933 HC SUT MCRYL J&J -A: Performed by: STUDENT IN AN ORGANIZED HEALTH CARE EDUCATION/TRAINING PROGRAM

## 2022-06-24 PROCEDURE — 77030026438 HC STYL ET INTUB CARD -A: Performed by: ANESTHESIOLOGY

## 2022-06-24 PROCEDURE — 77030010507 HC ADH SKN DERMBND J&J -B: Performed by: STUDENT IN AN ORGANIZED HEALTH CARE EDUCATION/TRAINING PROGRAM

## 2022-06-24 PROCEDURE — 85025 COMPLETE CBC W/AUTO DIFF WBC: CPT

## 2022-06-24 PROCEDURE — 74011250637 HC RX REV CODE- 250/637: Performed by: INTERNAL MEDICINE

## 2022-06-24 PROCEDURE — 76000 FLUOROSCOPY <1 HR PHYS/QHP: CPT

## 2022-06-24 PROCEDURE — 80053 COMPREHEN METABOLIC PANEL: CPT

## 2022-06-24 PROCEDURE — 77030040922 HC BLNKT HYPOTHRM STRY -A

## 2022-06-24 PROCEDURE — 73562 X-RAY EXAM OF KNEE 3: CPT

## 2022-06-24 PROCEDURE — 74011250636 HC RX REV CODE- 250/636: Performed by: NURSE ANESTHETIST, CERTIFIED REGISTERED

## 2022-06-24 PROCEDURE — APPSS30 APP SPLIT SHARED TIME 16-30 MINUTES: Performed by: NURSE PRACTITIONER

## 2022-06-24 PROCEDURE — C1713 ANCHOR/SCREW BN/BN,TIS/BN: HCPCS | Performed by: STUDENT IN AN ORGANIZED HEALTH CARE EDUCATION/TRAINING PROGRAM

## 2022-06-24 PROCEDURE — 77030036660

## 2022-06-24 PROCEDURE — 74011250637 HC RX REV CODE- 250/637: Performed by: STUDENT IN AN ORGANIZED HEALTH CARE EDUCATION/TRAINING PROGRAM

## 2022-06-24 PROCEDURE — 77030013079 HC BLNKT BAIR HGGR 3M -A: Performed by: ANESTHESIOLOGY

## 2022-06-24 PROCEDURE — C1769 GUIDE WIRE: HCPCS | Performed by: STUDENT IN AN ORGANIZED HEALTH CARE EDUCATION/TRAINING PROGRAM

## 2022-06-24 PROCEDURE — 96375 TX/PRO/DX INJ NEW DRUG ADDON: CPT

## 2022-06-24 PROCEDURE — 93005 ELECTROCARDIOGRAM TRACING: CPT

## 2022-06-24 PROCEDURE — 76010000161 HC OR TIME 1 TO 1.5 HR INTENSV-TIER 1: Performed by: STUDENT IN AN ORGANIZED HEALTH CARE EDUCATION/TRAINING PROGRAM

## 2022-06-24 PROCEDURE — 74011000250 HC RX REV CODE- 250: Performed by: NURSE ANESTHETIST, CERTIFIED REGISTERED

## 2022-06-24 PROCEDURE — 94761 N-INVAS EAR/PLS OXIMETRY MLT: CPT

## 2022-06-24 PROCEDURE — 74011250637 HC RX REV CODE- 250/637: Performed by: NURSE PRACTITIONER

## 2022-06-24 PROCEDURE — 51702 INSERT TEMP BLADDER CATH: CPT

## 2022-06-24 PROCEDURE — 76210000016 HC OR PH I REC 1 TO 1.5 HR: Performed by: STUDENT IN AN ORGANIZED HEALTH CARE EDUCATION/TRAINING PROGRAM

## 2022-06-24 PROCEDURE — 73700 CT LOWER EXTREMITY W/O DYE: CPT

## 2022-06-24 PROCEDURE — 96374 THER/PROPH/DIAG INJ IV PUSH: CPT

## 2022-06-24 PROCEDURE — 74011250636 HC RX REV CODE- 250/636: Performed by: STUDENT IN AN ORGANIZED HEALTH CARE EDUCATION/TRAINING PROGRAM

## 2022-06-24 PROCEDURE — 72170 X-RAY EXAM OF PELVIS: CPT

## 2022-06-24 PROCEDURE — 77030040361 HC SLV COMPR DVT MDII -B

## 2022-06-24 PROCEDURE — 99223 1ST HOSP IP/OBS HIGH 75: CPT | Performed by: STUDENT IN AN ORGANIZED HEALTH CARE EDUCATION/TRAINING PROGRAM

## 2022-06-24 PROCEDURE — 74011000250 HC RX REV CODE- 250: Performed by: NURSE PRACTITIONER

## 2022-06-24 PROCEDURE — 81001 URINALYSIS AUTO W/SCOPE: CPT

## 2022-06-24 PROCEDURE — 0QS734Z REPOSITION LEFT UPPER FEMUR WITH INTERNAL FIXATION DEVICE, PERCUTANEOUS APPROACH: ICD-10-PCS | Performed by: STUDENT IN AN ORGANIZED HEALTH CARE EDUCATION/TRAINING PROGRAM

## 2022-06-24 PROCEDURE — 36415 COLL VENOUS BLD VENIPUNCTURE: CPT

## 2022-06-24 PROCEDURE — 86900 BLOOD TYPING SEROLOGIC ABO: CPT

## 2022-06-24 PROCEDURE — 77030003785 HC BIT DRL DISP STRY -E: Performed by: STUDENT IN AN ORGANIZED HEALTH CARE EDUCATION/TRAINING PROGRAM

## 2022-06-24 PROCEDURE — 71045 X-RAY EXAM CHEST 1 VIEW: CPT

## 2022-06-24 PROCEDURE — 77030031139 HC SUT VCRL2 J&J -A: Performed by: STUDENT IN AN ORGANIZED HEALTH CARE EDUCATION/TRAINING PROGRAM

## 2022-06-24 PROCEDURE — 99285 EMERGENCY DEPT VISIT HI MDM: CPT

## 2022-06-24 PROCEDURE — 65270000029 HC RM PRIVATE

## 2022-06-24 PROCEDURE — 73552 X-RAY EXAM OF FEMUR 2/>: CPT

## 2022-06-24 PROCEDURE — 74011000250 HC RX REV CODE- 250: Performed by: STUDENT IN AN ORGANIZED HEALTH CARE EDUCATION/TRAINING PROGRAM

## 2022-06-24 PROCEDURE — 77030008684 HC TU ET CUF COVD -B: Performed by: ANESTHESIOLOGY

## 2022-06-24 PROCEDURE — 76060000033 HC ANESTHESIA 1 TO 1.5 HR: Performed by: STUDENT IN AN ORGANIZED HEALTH CARE EDUCATION/TRAINING PROGRAM

## 2022-06-24 DEVICE — CANNULATED SCREW
Type: IMPLANTABLE DEVICE | Site: HIP | Status: FUNCTIONAL
Brand: ASNIS

## 2022-06-24 RX ORDER — AMOXICILLIN 250 MG
2 CAPSULE ORAL 2 TIMES DAILY
Status: DISCONTINUED | OUTPATIENT
Start: 2022-06-24 | End: 2022-06-24

## 2022-06-24 RX ORDER — ACETAMINOPHEN 325 MG/1
650 TABLET ORAL EVERY 6 HOURS
Status: DISCONTINUED | OUTPATIENT
Start: 2022-06-24 | End: 2022-06-29 | Stop reason: HOSPADM

## 2022-06-24 RX ORDER — SODIUM CHLORIDE 0.9 % (FLUSH) 0.9 %
5-40 SYRINGE (ML) INJECTION EVERY 8 HOURS
Status: DISCONTINUED | OUTPATIENT
Start: 2022-06-24 | End: 2022-06-29 | Stop reason: HOSPADM

## 2022-06-24 RX ORDER — EZETIMIBE 10 MG/1
10 TABLET ORAL DAILY
COMMUNITY

## 2022-06-24 RX ORDER — SODIUM CHLORIDE 0.9 % (FLUSH) 0.9 %
5-40 SYRINGE (ML) INJECTION AS NEEDED
Status: DISCONTINUED | OUTPATIENT
Start: 2022-06-24 | End: 2022-06-29 | Stop reason: HOSPADM

## 2022-06-24 RX ORDER — EZETIMIBE 10 MG/1
10 TABLET ORAL DAILY
Status: DISCONTINUED | OUTPATIENT
Start: 2022-06-25 | End: 2022-06-29 | Stop reason: HOSPADM

## 2022-06-24 RX ORDER — ENOXAPARIN SODIUM 100 MG/ML
40 INJECTION SUBCUTANEOUS DAILY
Status: DISCONTINUED | OUTPATIENT
Start: 2022-06-25 | End: 2022-06-29 | Stop reason: HOSPADM

## 2022-06-24 RX ORDER — MELATONIN
1000 DAILY
Status: DISCONTINUED | OUTPATIENT
Start: 2022-06-25 | End: 2022-06-24

## 2022-06-24 RX ORDER — LORATADINE 10 MG/1
10 TABLET ORAL
COMMUNITY

## 2022-06-24 RX ORDER — FERROUS SULFATE, DRIED 160(50) MG
1 TABLET, EXTENDED RELEASE ORAL
Status: DISCONTINUED | OUTPATIENT
Start: 2022-06-25 | End: 2022-06-29 | Stop reason: HOSPADM

## 2022-06-24 RX ORDER — FENTANYL CITRATE 50 UG/ML
INJECTION, SOLUTION INTRAMUSCULAR; INTRAVENOUS AS NEEDED
Status: DISCONTINUED | OUTPATIENT
Start: 2022-06-24 | End: 2022-06-24 | Stop reason: HOSPADM

## 2022-06-24 RX ORDER — ROCURONIUM BROMIDE 10 MG/ML
INJECTION, SOLUTION INTRAVENOUS AS NEEDED
Status: DISCONTINUED | OUTPATIENT
Start: 2022-06-24 | End: 2022-06-24 | Stop reason: HOSPADM

## 2022-06-24 RX ORDER — GUAIFENESIN 100 MG/5ML
81 LIQUID (ML) ORAL DAILY
Status: DISCONTINUED | OUTPATIENT
Start: 2022-06-25 | End: 2022-06-29 | Stop reason: HOSPADM

## 2022-06-24 RX ORDER — ONDANSETRON 2 MG/ML
INJECTION INTRAMUSCULAR; INTRAVENOUS AS NEEDED
Status: DISCONTINUED | OUTPATIENT
Start: 2022-06-24 | End: 2022-06-24 | Stop reason: HOSPADM

## 2022-06-24 RX ORDER — SODIUM CHLORIDE, SODIUM LACTATE, POTASSIUM CHLORIDE, CALCIUM CHLORIDE 600; 310; 30; 20 MG/100ML; MG/100ML; MG/100ML; MG/100ML
75 INJECTION, SOLUTION INTRAVENOUS CONTINUOUS
Status: DISCONTINUED | OUTPATIENT
Start: 2022-06-24 | End: 2022-06-24 | Stop reason: HOSPADM

## 2022-06-24 RX ORDER — GLYCOPYRROLATE 0.2 MG/ML
INJECTION INTRAMUSCULAR; INTRAVENOUS AS NEEDED
Status: DISCONTINUED | OUTPATIENT
Start: 2022-06-24 | End: 2022-06-24 | Stop reason: HOSPADM

## 2022-06-24 RX ORDER — FACIAL-BODY WIPES
10 EACH TOPICAL DAILY PRN
Status: DISCONTINUED | OUTPATIENT
Start: 2022-06-26 | End: 2022-06-29 | Stop reason: HOSPADM

## 2022-06-24 RX ORDER — ROSUVASTATIN CALCIUM 10 MG/1
10 TABLET, COATED ORAL
Status: DISCONTINUED | OUTPATIENT
Start: 2022-06-24 | End: 2022-06-29 | Stop reason: HOSPADM

## 2022-06-24 RX ORDER — POLYETHYLENE GLYCOL 3350 17 G/17G
17 POWDER, FOR SOLUTION ORAL DAILY
Status: DISCONTINUED | OUTPATIENT
Start: 2022-06-25 | End: 2022-06-29 | Stop reason: HOSPADM

## 2022-06-24 RX ORDER — METOPROLOL TARTRATE 25 MG/1
25 TABLET, FILM COATED ORAL EVERY 12 HOURS
Status: DISCONTINUED | OUTPATIENT
Start: 2022-06-24 | End: 2022-06-27

## 2022-06-24 RX ORDER — OXYCODONE HYDROCHLORIDE 5 MG/1
5 TABLET ORAL
Status: DISCONTINUED | OUTPATIENT
Start: 2022-06-24 | End: 2022-06-29 | Stop reason: HOSPADM

## 2022-06-24 RX ORDER — GLUCOSAM/CHONDRO/HERB 149/HYAL 750-100 MG
1 TABLET ORAL DAILY
COMMUNITY

## 2022-06-24 RX ORDER — HYDROMORPHONE HYDROCHLORIDE 1 MG/ML
0.5 INJECTION, SOLUTION INTRAMUSCULAR; INTRAVENOUS; SUBCUTANEOUS
Status: DISCONTINUED | OUTPATIENT
Start: 2022-06-24 | End: 2022-06-24

## 2022-06-24 RX ORDER — OXYCODONE HYDROCHLORIDE 5 MG/1
2.5 TABLET ORAL
Status: DISCONTINUED | OUTPATIENT
Start: 2022-06-24 | End: 2022-06-29 | Stop reason: HOSPADM

## 2022-06-24 RX ORDER — ASPIRIN 81 MG/1
81 TABLET ORAL
COMMUNITY

## 2022-06-24 RX ORDER — HYDRALAZINE HYDROCHLORIDE 20 MG/ML
10 INJECTION INTRAMUSCULAR; INTRAVENOUS
Status: DISCONTINUED | OUTPATIENT
Start: 2022-06-24 | End: 2022-06-29 | Stop reason: HOSPADM

## 2022-06-24 RX ORDER — ONDANSETRON 2 MG/ML
4 INJECTION INTRAMUSCULAR; INTRAVENOUS
Status: COMPLETED | OUTPATIENT
Start: 2022-06-24 | End: 2022-06-24

## 2022-06-24 RX ORDER — AMOXICILLIN 250 MG
1 CAPSULE ORAL 2 TIMES DAILY
Status: DISCONTINUED | OUTPATIENT
Start: 2022-06-24 | End: 2022-06-29 | Stop reason: HOSPADM

## 2022-06-24 RX ORDER — HYDROMORPHONE HYDROCHLORIDE 1 MG/ML
.25-1 INJECTION, SOLUTION INTRAMUSCULAR; INTRAVENOUS; SUBCUTANEOUS
Status: DISCONTINUED | OUTPATIENT
Start: 2022-06-24 | End: 2022-06-24 | Stop reason: HOSPADM

## 2022-06-24 RX ORDER — SODIUM CHLORIDE 9 MG/ML
75 INJECTION, SOLUTION INTRAVENOUS CONTINUOUS
Status: DISCONTINUED | OUTPATIENT
Start: 2022-06-24 | End: 2022-06-27

## 2022-06-24 RX ORDER — NALOXONE HYDROCHLORIDE 0.4 MG/ML
0.4 INJECTION, SOLUTION INTRAMUSCULAR; INTRAVENOUS; SUBCUTANEOUS AS NEEDED
Status: DISCONTINUED | OUTPATIENT
Start: 2022-06-24 | End: 2022-06-24 | Stop reason: SDUPTHER

## 2022-06-24 RX ORDER — LEVOTHYROXINE SODIUM 50 UG/1
50 TABLET ORAL
Status: DISCONTINUED | OUTPATIENT
Start: 2022-06-25 | End: 2022-06-29 | Stop reason: HOSPADM

## 2022-06-24 RX ORDER — AZELASTINE 1 MG/ML
1 SPRAY, METERED NASAL
Status: DISCONTINUED | OUTPATIENT
Start: 2022-06-24 | End: 2022-06-29 | Stop reason: HOSPADM

## 2022-06-24 RX ORDER — NALOXONE HYDROCHLORIDE 0.4 MG/ML
0.4 INJECTION, SOLUTION INTRAMUSCULAR; INTRAVENOUS; SUBCUTANEOUS AS NEEDED
Status: DISCONTINUED | OUTPATIENT
Start: 2022-06-24 | End: 2022-06-29 | Stop reason: HOSPADM

## 2022-06-24 RX ORDER — PROPOFOL 10 MG/ML
INJECTION, EMULSION INTRAVENOUS AS NEEDED
Status: DISCONTINUED | OUTPATIENT
Start: 2022-06-24 | End: 2022-06-24 | Stop reason: HOSPADM

## 2022-06-24 RX ORDER — FENTANYL CITRATE 50 UG/ML
75 INJECTION, SOLUTION INTRAMUSCULAR; INTRAVENOUS ONCE
Status: COMPLETED | OUTPATIENT
Start: 2022-06-24 | End: 2022-06-24

## 2022-06-24 RX ORDER — LIDOCAINE HYDROCHLORIDE 20 MG/ML
INJECTION, SOLUTION EPIDURAL; INFILTRATION; INTRACAUDAL; PERINEURAL AS NEEDED
Status: DISCONTINUED | OUTPATIENT
Start: 2022-06-24 | End: 2022-06-24 | Stop reason: HOSPADM

## 2022-06-24 RX ORDER — AZELASTINE 1 MG/ML
1 SPRAY, METERED NASAL
COMMUNITY

## 2022-06-24 RX ORDER — NEOSTIGMINE METHYLSULFATE 1 MG/ML
INJECTION, SOLUTION INTRAVENOUS AS NEEDED
Status: DISCONTINUED | OUTPATIENT
Start: 2022-06-24 | End: 2022-06-24 | Stop reason: HOSPADM

## 2022-06-24 RX ORDER — SODIUM CHLORIDE 9 MG/ML
125 INJECTION, SOLUTION INTRAVENOUS CONTINUOUS
Status: DISPENSED | OUTPATIENT
Start: 2022-06-24 | End: 2022-06-25

## 2022-06-24 RX ORDER — FENTANYL CITRATE 50 UG/ML
25 INJECTION, SOLUTION INTRAMUSCULAR; INTRAVENOUS
Status: DISCONTINUED | OUTPATIENT
Start: 2022-06-24 | End: 2022-06-29 | Stop reason: HOSPADM

## 2022-06-24 RX ORDER — SODIUM CHLORIDE, SODIUM LACTATE, POTASSIUM CHLORIDE, CALCIUM CHLORIDE 600; 310; 30; 20 MG/100ML; MG/100ML; MG/100ML; MG/100ML
100 INJECTION, SOLUTION INTRAVENOUS CONTINUOUS
Status: DISCONTINUED | OUTPATIENT
Start: 2022-06-24 | End: 2022-06-24 | Stop reason: HOSPADM

## 2022-06-24 RX ORDER — DEXAMETHASONE SODIUM PHOSPHATE 4 MG/ML
INJECTION, SOLUTION INTRA-ARTICULAR; INTRALESIONAL; INTRAMUSCULAR; INTRAVENOUS; SOFT TISSUE AS NEEDED
Status: DISCONTINUED | OUTPATIENT
Start: 2022-06-24 | End: 2022-06-24 | Stop reason: HOSPADM

## 2022-06-24 RX ORDER — EPHEDRINE SULFATE/0.9% NACL/PF 50 MG/5 ML
SYRINGE (ML) INTRAVENOUS AS NEEDED
Status: DISCONTINUED | OUTPATIENT
Start: 2022-06-24 | End: 2022-06-24 | Stop reason: HOSPADM

## 2022-06-24 RX ADMIN — Medication 3 MG: at 17:26

## 2022-06-24 RX ADMIN — ACETAMINOPHEN 650 MG: 325 TABLET ORAL at 20:15

## 2022-06-24 RX ADMIN — FENTANYL CITRATE 25 MCG: 50 INJECTION, SOLUTION INTRAMUSCULAR; INTRAVENOUS at 17:39

## 2022-06-24 RX ADMIN — Medication 15 MG: at 16:53

## 2022-06-24 RX ADMIN — SODIUM CHLORIDE 75 ML/HR: 9 INJECTION, SOLUTION INTRAVENOUS at 13:05

## 2022-06-24 RX ADMIN — GLYCOPYRROLATE 0.4 MG: 0.2 INJECTION INTRAMUSCULAR; INTRAVENOUS at 17:26

## 2022-06-24 RX ADMIN — FENTANYL CITRATE 25 MCG: 50 INJECTION INTRAMUSCULAR; INTRAVENOUS at 13:38

## 2022-06-24 RX ADMIN — Medication 10 ML: at 20:14

## 2022-06-24 RX ADMIN — ROSUVASTATIN CALCIUM 10 MG: 10 TABLET, FILM COATED ORAL at 20:18

## 2022-06-24 RX ADMIN — SODIUM CHLORIDE 125 ML/HR: 9 INJECTION, SOLUTION INTRAVENOUS at 20:12

## 2022-06-24 RX ADMIN — DOCUSATE SODIUM 50MG AND SENNOSIDES 8.6MG 2 TABLET: 8.6; 5 TABLET, FILM COATED ORAL at 13:06

## 2022-06-24 RX ADMIN — OXYCODONE 5 MG: 5 TABLET ORAL at 13:06

## 2022-06-24 RX ADMIN — CEFAZOLIN SODIUM 2 G: 1 POWDER, FOR SOLUTION INTRAMUSCULAR; INTRAVENOUS at 16:36

## 2022-06-24 RX ADMIN — ACETAMINOPHEN 650 MG: 325 TABLET ORAL at 13:06

## 2022-06-24 RX ADMIN — DOCUSATE SODIUM 50MG AND SENNOSIDES 8.6MG 1 TABLET: 8.6; 5 TABLET, FILM COATED ORAL at 20:15

## 2022-06-24 RX ADMIN — ONDANSETRON HYDROCHLORIDE 4 MG: 2 SOLUTION INTRAMUSCULAR; INTRAVENOUS at 17:23

## 2022-06-24 RX ADMIN — Medication 10 MG: at 17:17

## 2022-06-24 RX ADMIN — FENTANYL CITRATE 50 MCG: 50 INJECTION, SOLUTION INTRAMUSCULAR; INTRAVENOUS at 16:36

## 2022-06-24 RX ADMIN — DEXAMETHASONE SODIUM PHOSPHATE 8 MG: 4 INJECTION, SOLUTION INTRAMUSCULAR; INTRAVENOUS at 16:36

## 2022-06-24 RX ADMIN — PROPOFOL 150 MG: 10 INJECTION, EMULSION INTRAVENOUS at 16:41

## 2022-06-24 RX ADMIN — FENTANYL CITRATE 75 MCG: 50 INJECTION, SOLUTION INTRAMUSCULAR; INTRAVENOUS at 11:10

## 2022-06-24 RX ADMIN — Medication 10 MG: at 17:02

## 2022-06-24 RX ADMIN — LIDOCAINE HYDROCHLORIDE 60 MG: 20 INJECTION, SOLUTION EPIDURAL; INFILTRATION; INTRACAUDAL; PERINEURAL at 17:34

## 2022-06-24 RX ADMIN — ONDANSETRON 4 MG: 2 INJECTION INTRAMUSCULAR; INTRAVENOUS at 11:10

## 2022-06-24 RX ADMIN — ROCURONIUM BROMIDE 10 MG: 10 INJECTION INTRAVENOUS at 16:47

## 2022-06-24 RX ADMIN — Medication 15 MG: at 16:47

## 2022-06-24 RX ADMIN — ACETAMINOPHEN 650 MG: 325 TABLET ORAL at 23:39

## 2022-06-24 RX ADMIN — CEFAZOLIN 2 G: 1 INJECTION, POWDER, FOR SOLUTION INTRAMUSCULAR; INTRAVENOUS at 23:38

## 2022-06-24 RX ADMIN — FENTANYL CITRATE 100 MCG: 50 INJECTION, SOLUTION INTRAMUSCULAR; INTRAVENOUS at 16:40

## 2022-06-24 RX ADMIN — SODIUM CHLORIDE, POTASSIUM CHLORIDE, SODIUM LACTATE AND CALCIUM CHLORIDE 75 ML/HR: 600; 310; 30; 20 INJECTION, SOLUTION INTRAVENOUS at 16:38

## 2022-06-24 RX ADMIN — ROCURONIUM BROMIDE 25 MG: 10 INJECTION INTRAVENOUS at 16:41

## 2022-06-24 NOTE — PERIOP NOTES
Pt bladder-scanned; over 700 ml of urine in bladder. Jiménez not inserted in OR; called surgeon to confirm ok to insert Jiménez d/t not voiding since 1000 today. Jiménez inserted and over 750 ml of urine drained.

## 2022-06-24 NOTE — PROGRESS NOTES
Patient arrived to floor, Vitals obtained. Will endorse skin assessment, swallow eval, incentive spirometer training/teaching, and full assessment/required documentation. No needs at this time. Patient denies pain. Will release signed and held orders.

## 2022-06-24 NOTE — CONSULTS
ORTHOPAEDIC CONSULT NOTE    Subjective:     Date of Consultation:  2022      Shefali Elder is a 66 y.o. female who is being seen for L hip pain s/p GLF this AM. Work up has reveled a Left fem neck fracture. Pt is active and independent with ADLs. Pt's family at bedside during exam. Plan of care discussed with pt and family, all questions/concerns addressed and pt and family verbalized understanding of plan of care.      Patient Active Problem List    Diagnosis Date Noted    Altered mental state 2021    Acute CVA (cerebrovascular accident) ([facility] [address] 2021     Family History   Problem Relation Age of Onset    Stroke Mother         hemorrhagic stroke    Coronary Art Dis Mother     Hypertension Mother     Other Mother         hydrocephaleus/had shunt    Stroke Father     Coronary Art Dis Father     Heart Surgery Father     Other Father         subdural hematoma    Other Other         cousin \"never woke up after surgery\" - pt unaware of circumstance    Cancer Other         Distant relative with colon cancer    Cancer Paternal Aunt         colon      Social History     Tobacco Use    Smoking status: Never Smoker    Smokeless tobacco: Never Used   Substance Use Topics    Alcohol use: No     Past Medical History:   Diagnosis Date    Arthritis     right knee - cortison injection    CAD (coronary artery disease)     heart attack/stents    Cancer ([facility] [address]     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 Authorizing Provider   levothyroxine (SYNTHROID) 50 mcg tablet Take 50 mcg by mouth Daily (before breakfast). Provider, Historical   clonazePAM (KlonoPIN) 0.5 mg tablet Take 0.25 mg by mouth nightly as needed for Anxiety. Provider, Historical   sucralfate (Carafate) 1 gram tablet Take 1 g by mouth four (4) times daily. Indications: heartburn    Provider, Historical   famotidine (Pepcid) 20 mg tablet Take 40 mg by mouth daily. Provider, Historical   zoledronic acid (RECLAST) 5 mg/100 mL pgbk 5 mg by IntraVENous route once. Provider, Historical   clopidogrel (PLAVIX) 75 mg tab Take 75 mg by mouth. Provider, Historical   losartan (COZAAR) 25 mg tablet Take 25 mg by mouth daily. Provider, Historical   ASPIRIN PO Take 81 mg by mouth two (2) times a day. Provider, Historical   rosuvastatin (CRESTOR) 10 mg tablet Take 10 mg by mouth daily. Provider, Historical   metoprolol tartrate (LOPRESSOR) 25 mg tablet Take 25 mg by mouth two (2) times a day. Provider, Historical   cholecalciferol (VITAMIN D3) 1,000 unit tablet Take  by mouth. Takes 2000 units occasionally. Provider, Historical     No current facility-administered medications for this encounter. Current Outpatient Medications   Medication Sig    levothyroxine (SYNTHROID) 50 mcg tablet Take 50 mcg by mouth Daily (before breakfast).  clonazePAM (KlonoPIN) 0.5 mg tablet Take 0.25 mg by mouth nightly as needed for Anxiety.  sucralfate (Carafate) 1 gram tablet Take 1 g by mouth four (4) times daily.  Indications: heartburn    famotidine (Pepcid) 20 mg tablet Take 40 mg by mouth daily.  zoledronic acid (RECLAST) 5 mg/100 mL pgbk 5 mg by IntraVENous route once.  clopidogrel (PLAVIX) 75 mg tab Take 75 mg by mouth.  losartan (COZAAR) 25 mg tablet Take 25 mg by mouth daily.  ASPIRIN PO Take 81 mg by mouth two (2) times a day.  rosuvastatin (CRESTOR) 10 mg tablet Take 10 mg by mouth daily.  metoprolol tartrate (LOPRESSOR) 25 mg tablet Take 25 mg by mouth two (2) times a day.  cholecalciferol (VITAMIN D3) 1,000 unit tablet Take  by mouth. Takes 2000 units occasionally. Allergies   Allergen Reactions    Duramorph [Morphine (Pf)] Itching and Nausea Only     Nausea came after pt was given something for itching.  Other Plant, Animal, Environmental Other (comments)     Mite and grass allergies. Review of Systems:  A comprehensive review of systems was negative except for that written in the HPI. Mental Status: no dementia    Objective:     Patient Vitals for the past 8 hrs:   BP Temp Pulse Resp SpO2 Height Weight   22 1245 (!) 133/50 -- (!) 57 19 99 % -- --   22 1233 (!) 134/56 -- (!) 59 18 99 % -- --   22 1218 (!) 130/52 -- (!) 54 13 99 % -- --   22 1203 (!) 137/59 -- (!) 53 13 99 % -- --   22 1154 -- -- 69 18 99 % -- --   22 1141 -- -- 69 -- -- -- --   22 1134 (!) 148/58 -- -- -- 95 % -- --   22 1100 (!) 146/62 -- -- -- 95 % -- --   22 1027 -- -- -- -- 99 % -- --   22 1026 (!) 179/62 97.9 °F (36.6 °C) 62 16 99 % -- --   22 1024 -- -- -- -- -- 5' 2\" (1.575 m) 58.1 kg (128 lb)     Temp (24hrs), Av.9 °F (36.6 °C), Min:97.9 °F (36.6 °C), Max:97.9 °F (36.6 °C)      Gen: Well-developed,  in no acute distress  Musc: LLE - externally rotated, NVI     Skin: No skin breakdown noted.  Skin warm, pink, dry  Neuro: Cranial nerves are grossly intact, no focal motor weakness, follows commands appropriately   Psych: Good insight, oriented to person, place and time, alert    Imaging Review:XR HIP LT W OR WO PELV 2-3 VWS (Final result)  Result time 06/24/22 11:40:37  Final result                Impression:    Subcapital left femoral neck fracture. .            Narrative:    EXAM: XR HIP LT W OR WO PELV 2-3 VWS     INDICATION: Concerns for proximal femur fracture. COMPARISON: None. FINDINGS: AP view of the pelvis and a frogleg lateral view of the left hip   demonstrate subcapital left femoral neck fracture. .                   Labs:   Recent Results (from the past 24 hour(s))   SAMPLES BEING HELD    Collection Time: 06/24/22 10:29 AM   Result Value Ref Range    SAMPLES BEING HELD LV.PST.RED     COMMENT        Add-on orders for these samples will be processed based on acceptable specimen integrity and analyte stability, which may vary by analyte. CBC WITH AUTOMATED DIFF    Collection Time: 06/24/22 10:29 AM   Result Value Ref Range    WBC 12.2 (H) 3.6 - 11.0 K/uL    RBC 4.41 3.80 - 5.20 M/uL    HGB 13.9 11.5 - 16.0 g/dL    HCT 42.7 35.0 - 47.0 %    MCV 96.8 80.0 - 99.0 FL    MCH 31.5 26.0 - 34.0 PG    MCHC 32.6 30.0 - 36.5 g/dL    RDW 13.1 11.5 - 14.5 %    PLATELET 638 736 - 674 K/uL    MPV 11.5 8.9 - 12.9 FL    NRBC 0.2 (H) 0  WBC    ABSOLUTE NRBC 0.02 (H) 0.00 - 0.01 K/uL    NEUTROPHILS 84 (H) 32 - 75 %    LYMPHOCYTES 9 (L) 12 - 49 %    MONOCYTES 6 5 - 13 %    EOSINOPHILS 0 0 - 7 %    BASOPHILS 0 0 - 1 %    IMMATURE GRANULOCYTES 1 (H) 0.0 - 0.5 %    ABS. NEUTROPHILS 10.3 (H) 1.8 - 8.0 K/UL    ABS. LYMPHOCYTES 1.1 0.8 - 3.5 K/UL    ABS. MONOCYTES 0.7 0.0 - 1.0 K/UL    ABS. EOSINOPHILS 0.0 0.0 - 0.4 K/UL    ABS. BASOPHILS 0.0 0.0 - 0.1 K/UL    ABS. IMM.  GRANS. 0.1 (H) 0.00 - 0.04 K/UL    DF AUTOMATED     PROTHROMBIN TIME + INR    Collection Time: 06/24/22 11:30 AM   Result Value Ref Range    INR 1.0 0.9 - 1.1      Prothrombin time 10.9 9.0 - 05.1 sec   METABOLIC PANEL, COMPREHENSIVE    Collection Time: 06/24/22 11:30 AM   Result Value Ref Range    Sodium 141 136 - 145 mmol/L    Potassium 4.5 3.5 - 5.1 mmol/L    Chloride 107 97 - 108 mmol/L    CO2 26 21 - 32 mmol/L    Anion gap 8 5 - 15 mmol/L    Glucose 97 65 - 100 mg/dL    BUN 17 6 - 20 MG/DL    Creatinine 0.81 0.55 - 1.02 MG/DL    BUN/Creatinine ratio 21 (H) 12 - 20      GFR est AA >60 >60 ml/min/1.73m2    GFR est non-AA >60 >60 ml/min/1.73m2    Calcium 9.1 8.5 - 10.1 MG/DL    Bilirubin, total 0.4 0.2 - 1.0 MG/DL    ALT (SGPT) 34 12 - 78 U/L    AST (SGOT) 34 15 - 37 U/L    Alk. phosphatase 52 45 - 117 U/L    Protein, total 6.3 (L) 6.4 - 8.2 g/dL    Albumin 3.5 3.5 - 5.0 g/dL    Globulin 2.8 2.0 - 4.0 g/dL    A-G Ratio 1.3 1.1 - 2.2     TYPE & SCREEN    Collection Time: 06/24/22 11:30 AM   Result Value Ref Range    Crossmatch Expiration 06/27/2022,2359     ABO/Rh(D) O POSITIVE     Antibody screen NEG    EKG, 12 LEAD, INITIAL    Collection Time: 06/24/22 11:53 AM   Result Value Ref Range    Ventricular Rate 59 BPM    Atrial Rate 59 BPM    P-R Interval 160 ms    QRS Duration 92 ms    Q-T Interval 406 ms    QTC Calculation (Bezet) 401 ms    Calculated P Axis 51 degrees    Calculated R Axis -39 degrees    Calculated T Axis 27 degrees    Diagnosis       Sinus bradycardia  Left axis deviation  Possible Anterior infarct , age undetermined  Abnormal ECG  When compared with ECG of 28-NOV-2021 15:53,  Non-specific change in ST segment in Lateral leads           Impression:     Patient Active Problem List    Diagnosis Date Noted    Altered mental state 11/28/2021    Acute CVA (cerebrovascular accident) ([facility] [address] 11/28/2021     Active Problems:    * No active hospital problems. *      Plan:   -  Plan for ORIF of femur fracture with Dr. Batsheva Paulino this afternoon   -  NPO, bedrest, corrales, IVF, pain management  -  Clearance pending    -  DVT Prophylaxis - SCDs pre op    Dr. Batsheva Paulino aware and agrees with plan as above.         Mayo Banuelos, NP  Orthopedic Nurse Practitioner   [employer]

## 2022-06-24 NOTE — PROGRESS NOTES
Cardiology Initial Care Encounter    Patient: Cristi Kitchen MRN: [id number]     YOB: 1950  Age: 66 y.o. Sex: female      Admit Date: 6/24/2022       Assessment/Plan     1 CAD s/p MI with PCI to [address] in 2011. Stable no s/s angina. EKG non ischemic. ECHO Nov 2021: with preserved EF, no WMA. Cont BB, statin. Resume asa post op.   2 Hx PAD: on asa/plavix. 3.  Subcapital left femoral neck fracture: For OR today. Pt is stable from cardiac standpoint for surgery. HPI     Cristi Kitchen is a 66 y.o.   female  with PMH of CAD s/p MI in 2011 , 2 stents placed in RCA, followed by Dr Christina Merritt also for PAD  with stenting to RLE (on asa, plavix) who sustained a GLF at home today sustaining a L femur fracture. No LOC. . Last saw Dr Christina Merritt in 2022 for follow up for event monitor ater her admission her in November for Transient alteration of awareness  Hypertensive encephalopathy. Was told she has some early beats, no a fib. Took asa/plavix today. The patient denies chest pain, dependent edema, diaphoresis, SELBY, orthopnea, palpitations, PND, shortness of breath, claudication or syncope. The patient has been referred to cardiology for on going management of pre op clearance for ORIF femur        Review of Symptoms:  Constitutional: negative for fatigue  ENT: negative for hearing loss   Respiratory: negative for dyspnea on exertion  Gastrointestinal: negative for nausea and vomiting  Genitourinary: no dysuria, hematuria, frequency   Musculoskeletal:positive for bone pain  Neurological: negative for memory problems  Other systems reviewed and negative except as above. Previous cardiac hx  11/28/21    ECHO ADULT COMPLETE 11/30/2021 11/30/2021    Interpretation Summary  · LV: Estimated LVEF is 60 - 65%. Normal cavity size, wall thickness and systolic function (ejection fraction normal). Wall motion: normal. Age-appropriate left ventricular diastolic function.   · IAS: Agitated saline contrast study was performed. There was no shunting at baseline, with provocation or with Valsalva. Signed by: Gabriel Booker DO on 11/30/2021  2:47 PM        Risk factors: 'post menopausal female   + family hx CAD     Social History     Tobacco Use    Smoking status: Never Smoker    Smokeless tobacco: Never Used   Substance Use Topics    Alcohol use: No     Family History   Problem Relation Age of Onset    Stroke Mother         hemorrhagic stroke    Coronary Art Dis Mother     Hypertension Mother     Other Mother         hydrocephaleus/had shunt    Stroke Father     Coronary Art Dis Father     Heart Surgery Father     Other Father         subdural hematoma    Other Other         cousin \"never woke up after surgery\" - pt unaware of circumstance    Cancer Other         Distant relative with colon cancer    Cancer Paternal Aunt         colon       Current Facility-Administered Medications   Medication Dose Route Frequency    0.9% sodium chloride infusion  75 mL/hr IntraVENous CONTINUOUS    sodium chloride (NS) flush 5-40 mL  5-40 mL IntraVENous Q8H    sodium chloride (NS) flush 5-40 mL  5-40 mL IntraVENous PRN    acetaminophen (TYLENOL) tablet 650 mg  650 mg Oral Q6H    oxyCODONE IR (ROXICODONE) tablet 2.5 mg  2.5 mg Oral Q4H PRN    oxyCODONE IR (ROXICODONE) tablet 5 mg  5 mg Oral Q4H PRN    naloxone (NARCAN) injection 0.4 mg  0.4 mg IntraVENous PRN    senna-docusate (PERICOLACE) 8.6-50 mg per tablet 2 Tablet  2 Tablet Oral BID    fentaNYL citrate (PF) injection 25 mcg  25 mcg IntraVENous Q4H PRN    sodium chloride (NS) flush 5-40 mL  5-40 mL IntraVENous Q8H    sodium chloride (NS) flush 5-40 mL  5-40 mL IntraVENous PRN     Current Outpatient Medications   Medication Sig    aspirin delayed-release 81 mg tablet Take 81 mg by mouth four (4) times daily (with meals).  ezetimibe (Zetia) 10 mg tablet Take 10 mg by mouth daily.     azelastine (ASTELIN) 137 mcg (0.1 %) nasal spray 1 Spray by Both Nostrils route two (2) times daily as needed for Rhinitis. Use in each nostril as directed    omega 3-DHA-EPA-fish oil 1,000 mg (120 mg-180 mg) capsule Take 1 Capsule by mouth daily.  loratadine (Claritin) 10 mg tablet Take 10 mg by mouth daily as needed for Allergies.  vitamin J-U-B-lutein-minerals (OCUVITE) tablet Take 1 Tablet by mouth daily.  levothyroxine (SYNTHROID) 50 mcg tablet Take 50 mcg by mouth Daily (before breakfast).  zoledronic acid (RECLAST) 5 mg/100 mL pgbk 5 mg by IntraVENous route once.  clopidogrel (PLAVIX) 75 mg tab Take 75 mg by mouth daily.  losartan (COZAAR) 50 mg tablet Take 50 mg by mouth daily as needed (SBP > 120).  rosuvastatin (CRESTOR) 10 mg tablet Take 10 mg by mouth nightly.  metoprolol tartrate (LOPRESSOR) 25 mg tablet Take 25 mg by mouth two (2) times a day.  cholecalciferol (VITAMIN D3) 1,000 unit tablet Take 1,000 Units by mouth daily. Objective:     Vitals:    06/24/22 1233 06/24/22 1245 06/24/22 1338 06/24/22 1420   BP: (!) 134/56 (!) 133/50 (!) 144/48 138/65   Pulse: (!) 59 (!) 57 61 68   Resp: 18 19 22 10   Temp:       SpO2: 99% 99% 98% 97%   Weight:       Height:            Intake and Output:  Current Shift: No intake/output data recorded. Last three shifts: No intake/output data recorded. Gen: Well-developed, well-nourished, in no acute distress  Neck: Supple,  No JVD, No Carotid Bruit,   Resp: No accessory muscle use, Clear breath sounds, No rales or rhonchi  Card: Regular Rate,Rythm,Normal S1, S2, No murmurs, rubs or gallop.     Abd:  Soft, non-tender, non-distended,BS+  MSK: No cyanosis  Skin: No rashes    Neuro: moving all four extremities , follows commands appropriately  Psych:  Good insight, oriented to person, place , alert, Nml Affect  LE: No edema      Media Information               Document Information      TELEMETRY: SR     Lab/Data Review:  CMP:   Lab Results   Component Value Date/Time     06/24/2022 11:30 AM    K 4.5 06/24/2022 11:30 AM     06/24/2022 11:30 AM    CO2 26 06/24/2022 11:30 AM    AGAP 8 06/24/2022 11:30 AM    GLU 97 06/24/2022 11:30 AM    BUN 17 06/24/2022 11:30 AM    CREA 0.81 06/24/2022 11:30 AM    GFRAA >60 06/24/2022 11:30 AM    GFRNA >60 06/24/2022 11:30 AM    CA 9.1 06/24/2022 11:30 AM    ALB 3.5 06/24/2022 11:30 AM    TP 6.3 (L) 06/24/2022 11:30 AM    GLOB 2.8 06/24/2022 11:30 AM    AGRAT 1.3 06/24/2022 11:30 AM    ALT 34 06/24/2022 11:30 AM     CBC:   Lab Results   Component Value Date/Time    WBC 12.2 (H) 06/24/2022 10:29 AM    HGB 13.9 06/24/2022 10:29 AM    HCT 42.7 06/24/2022 10:29 AM     06/24/2022 10:29 AM        Signed By: Gretchen Maxwell NP     June 24, 2022

## 2022-06-24 NOTE — OP NOTES
OPERATIVE REPORT   Admit Date: 6/24/2022  Admit Diagnosis: Femur fracture ([address] [S72.90XA]    Date of Procedure: 6/24/2022   Preoperative Diagnosis: left hip fracture  Postoperative Diagnosis: left hip fracture    Procedure: Procedure(s):  HIP PERCUTANEOUS PINNING CANNULATED SCREWS  Surgeon: Clara Hunt MD  Assistant(s): None  Anesthesia: General   Estimated Blood Loss: 25mL  Specimens: * No specimens in log *   Complications: None        INDICATIONS:     The patient is a 66 y.o.,  with an acute fall and was found to have a left femoral neck minimally displaced hip fracture. The patient was evaluated by the hospitalist and cleared for surgery. Informed consent was obtained and we discussed the risk benefits and alternatives to hip pinning. I explained that the risks include but are not limited to pain, bleeding, infection, need for future surgeries, avascular necrosis, continued hip pain, hardware failure, malunion, nonunion, iatrogenic fracture/stress fracture, DVT/PE, and anesthesia risks. After considering the risks and discussing it thoroughly together today the patient/family has opted to move forward with surgical management of this fracture (see my consult note). DESCRIPTION OF PROCEDURE:             The patient underwent the appropriate anesthesia and was taken to the operating room. Patient was positioned on the fracture table and both extremities were well padded with a well padded post. The patient received pre op antibiotics. The fracture was evaluated in both AP and lateral planes with the C arm. The hip was then prepped and drapped in a sterile fashion. Prior to the incision the appropriate time-out was performed. Utilizing fluoroscopic guidance the level of the lesser trochanter was marked and a 3cm incision was made starting just distal to this naila and extending proximally. The fascia was sharply incised.  The guidewire for the inferior guidewire was placed first under fluoroscopic guidance in the appropriate position. Following the inferior guidewire the posterior superior guidewire and then the anterior superior guidewire were placed under fluoroscopic guidance forming an inverted triangle. Once we were satisfied with the position of the guidewires in the AP and lateral planes we measured for screw lengths. The lateral cortex for each screw was drilled and the screws were placed in the following order - inferior (85mm short thread), posterior(85mm short thread), anterior(85mm short thread). This again was done under fluoroscopic guidance. All 3 screws were found to have good bite. The guidewires were removed and final fluoroscopic images were obtained. The incision was thoroughly irrigated. The fascia was closed with 0 vicryl interrupted sutures. Skin closure was performed in layer with 2-Vicryl for the subcutaneous tissue, 3-0 monocryl with dermabond for the skin. A sterile dressing was then applied. The patient was taken to recovery in a stable condition. IMPLANTS :   Implant Name Type Inv.  Item Serial No.  Lot No. LRB No. Used Action   SCR BNE BRUNILDA 6.5X85MM PT 20MM -- ASNIS III TI - SNA  SCR BNE BRUNILDA 6.5X85MM PT 20MM -- ASNIS III TI NA [employer] HOWM_WD NA Left 3 Implanted

## 2022-06-24 NOTE — ED TRIAGE NOTES
Pt to ED via EMS from home for c/o L hip pain after fall. Pt reports being spooked by a water bug causing her to fall backwards landing on L hip. Denies LOC or striking head. Pt currently reports taking her plavix PTA. Unable to bear weight at this time.  Sensation intact and no shortening noted and pt reports taking 2 500mg tabs of Tylenol

## 2022-06-24 NOTE — PROGRESS NOTES
Dr. Giovany Garcia notified that pre-op rhythm strip was not placed in chart.      EKG complete in ED 6/24/2022  Cardiology consult completed today 6/24/2022

## 2022-06-24 NOTE — ANESTHESIA PREPROCEDURE EVALUATION
Relevant Problems   CARDIOVASCULAR   (+) CAD (coronary artery disease)       Anesthetic History   No history of anesthetic complications            Review of Systems / Medical History  Patient summary reviewed and pertinent labs reviewed    Pulmonary  Within defined limits                 Neuro/Psych   Within defined limits           Cardiovascular    Hypertension          Past MI, CAD and cardiac stents         GI/Hepatic/Renal  Within defined limits              Endo/Other      Hypothyroidism  Arthritis and cancer     Other Findings   Comments: Took plavix this am           Physical Exam    Airway  Mallampati: II  TM Distance: > 6 cm  Neck ROM: normal range of motion   Mouth opening: Normal     Cardiovascular  Regular rate and rhythm,  S1 and S2 normal,  no murmur, click, rub, or gallop             Dental  No notable dental hx       Pulmonary  Breath sounds clear to auscultation               Abdominal  GI exam deferred       Other Findings            Anesthetic Plan    ASA: 3  Anesthesia type: general            Anesthetic plan and risks discussed with: Patient

## 2022-06-24 NOTE — H&P
[facility]  [address]  [phone]    Admission History and Physical      NAME:  Nancy Oneil   :   1950   MRN:  [id number]     PCP:  Alondra Thomas MD     Date/Time:  2022         Subjective:     CHIEF COMPLAINT: \"I fell\"     HISTORY OF PRESENT ILLNESS:     Ms. Rosa Johnson is a 66 y.o.  female with PMH of CAD, HTN, osteoporosis admitted s/p fall for L femoral neck fracture. Pt reports the fall was mechanical. Denies preceeding CP, SOB, dizziness, palpitations. Prior to this event was independent and denied exertional CP, SOB. In fact had recently spent 2 hours mowing her lawn with push mower without symptoms.      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    Hypertension     Ill-defined condition     increased cholesterol    Ill-defined condition     osteoporosis    Ill-defined condition     dysphagia    Ill-defined condition     hiatal hernia    Thyroid disease         Past Surgical History:   Procedure Laterality Date    COLONOSCOPY N/A 2016    COLONOSCOPY performed by Elissa Ford MD at [facility] ENDOSCOPY     [address]      x2    HX CHOLECYSTECTOMY      HX COLONOSCOPY      ,     HX DILATION AND CURETTAGE      HX GI      colonoscopy    HX HEART CATHETERIZATION      stents/has a significant blockage that is being watched    HX HEENT      thyroidectomy    HX HYSTERECTOMY      HX ORTHOPAEDIC      anterior muscle removed d/t 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 - has foot drop now    HX TONSILLECTOMY      MI CHEST SURGERY PROCEDURE UNLISTED      part of right lung removed for nodule - ?path       Social History     Tobacco Use    Smoking status: Never Smoker    Smokeless tobacco: Never Used   Substance Use Topics    Alcohol use: No        Family History   Problem Relation Age of Onset    Stroke Mother         hemorrhagic stroke    Coronary Art Dis Mother     Hypertension Mother     Other Mother         hydrocephaleus/had shunt    Stroke Father     Coronary Art Dis Father     Heart Surgery Father     Other Father         subdural hematoma    Other Other         cousin \"never woke up after surgery\" - pt unaware of circumstance    Cancer Other         Distant relative with colon cancer    Cancer Paternal Aunt         colon        Allergies   Allergen Reactions    Duramorph [Morphine (Pf)] Itching and Nausea Only     Nausea came after pt was given something for itching.  Other Plant, Animal, Environmental Other (comments)     Mite and grass allergies. Prior to Admission medications    Medication Sig Start Date End Date Taking? Authorizing Provider   aspirin delayed-release 81 mg tablet Take 81 mg by mouth four (4) times daily (with meals). Yes Provider, Historical   ezetimibe (Zetia) 10 mg tablet Take 10 mg by mouth daily. Yes Provider, Historical   azelastine (ASTELIN) 137 mcg (0.1 %) nasal spray 1 [place] by Both Nostrils route two (2) times daily as needed for Rhinitis. Use in each nostril as directed   Yes Provider, Historical   omega 3-DHA-EPA-fish oil 1,000 mg (120 mg-180 mg) capsule Take 1 Capsule by mouth daily. Yes Provider, Historical   loratadine (Claritin) 10 mg tablet Take 10 mg by mouth daily as needed for Allergies. Yes Provider, Historical   vitamin I-R-O-lutein-minerals (OCUVITE) tablet Take 1 Tablet by mouth daily. Yes Provider, Historical   levothyroxine (SYNTHROID) 50 mcg tablet Take 50 mcg by mouth Daily (before breakfast). Yes Provider, Historical   zoledronic acid (RECLAST) 5 mg/100 mL pgbk 5 mg by IntraVENous route once. Yes Provider, Historical   clopidogrel (PLAVIX) 75 mg tab Take 75 mg by mouth daily. Yes Provider, Historical   losartan (COZAAR) 50 mg tablet Take 50 mg by mouth daily as needed (SBP > 120).    Yes Provider, Historical   rosuvastatin (CRESTOR) 10 mg tablet Take 10 mg by mouth nightly. Yes Provider, Historical   metoprolol tartrate (LOPRESSOR) 25 mg tablet Take 25 mg by mouth two (2) times a day. Yes Provider, Historical   cholecalciferol (VITAMIN D3) 1,000 unit tablet Take 1,000 Units by mouth daily. Yes Provider, Historical         Review of Systems:  (bold if positive, if negative)    Gen:  Eyes:  ENT:  CVS:  Pulm:  GI:    :    MS:  Skin:  Psych:  Endo:    Hem:  Renal:    Neuro:     Hip pain        Objective:      VITALS:    Vital signs reviewed; most recent are:    Visit Vitals  BP (!) 143/56 (BP 1 Location: Left upper arm, BP Patient Position: At rest;Lying;Supine)   Pulse 67   Temp 97.9 °F (36.6 °C)   Resp 13   Ht 5' 2\" (1.575 m)   Wt 58.1 kg (128 lb)   SpO2 97%   BMI 23.41 kg/m²     SpO2 Readings from Last 6 Encounters:   06/24/22 97%   11/30/21 94%   08/29/19 99%   08/30/16 100%        No intake or output data in the 24 hours ending 06/24/22 1621         Exam:     Physical Exam:    Gen:  Well-developed, well-nourished, in no acute distress  HEENT:  Pink conjunctivae, PERRL, hearing intact to voice, moist mucous membranes  Neck:  Supple, without masses, thyroid non-tender  Resp:  No accessory muscle use, clear breath sounds without wheezes rales or rhonchi  Card:  No murmurs, normal S1, S2 without thrills, bruits or peripheral edema  Abd:  Soft, non-tender, non-distended, normoactive bowel sounds are present, no palpable organomegaly  Lymph:  No cervical adenopathy  Musc:  No cyanosis or clubbing  Skin:  No rashes or ulcers, skin turgor is good  Neuro:  Cranial nerves 3-12 are grossly intact   Psych:  Alert with good insight. Oriented to person, place, and time  LLE slightly rotated.  Distal pulse in tact      Labs:    Recent Labs     06/24/22  1029   WBC 12.2*   HGB 13.9   HCT 42.7        Recent Labs     06/24/22  1130      K 4.5      CO2 26   GLU 97   BUN 17   CREA 0.81   CA 9. 1   ALB 3.5   ALT 34     No components found for: GLPOC  No results for input(s): PH, PCO2, PO2, HCO3, FIO2 in the last 72 hours. Recent Labs     06/24/22  1130   INR 1.0     CT =>   Acute left femoral neck fracture     Assessment/Plan:      Femur fracture ([address] (6/24/2022)  -defer repair to ortho including initiation of Lovenox, PT/OT   -continue scheduled tylenol   -PRN pain meds      Thyroid disease ()  -resume levothyroxine       Ill-defined condition ()      Overview: osteoporosis  -Ca/vitamin D post surgery   -was on Reclast PTA       CAD (coronary artery disease) ()      Overview: heart attack/stents  -resume ASA as soon as able post op  -defer to ortho when ok to restart plavix.  Would defer to cards whether needs DAPT as stent placement was years ago     HTN   -hold beta blocker and ARB for now as anticipate lower BP's intra and post-op      History of CVA (cerebrovascular accident) (6/24/2022)      Overview: Noted on MRI in 11/2021 => chronic infarct L centrum semiovale  -as above, resume ASA/plavix when able     Surrogate decision maker:  Daughter    Total time spent with patient: 79 [phone] discussed with: Patient and Family, ortho, cards    Discussed:  Care Plan    Prophylaxis:  SCD's    Probable Disposition:  Home w/Family           ___________________________________________________    Attending Physician: Damien Villanueva MD

## 2022-06-24 NOTE — BRIEF OP NOTE
Brief Postoperative Note    Patient: Vera Yanez  YOB: 1950  MRN: [id number]    Date of Procedure: 6/24/2022     Pre-Op Diagnosis: left hip fracture    Post-Op Diagnosis: Same as preoperative diagnosis. Procedure(s):  HIP PERCUTANEOUS PINNING CANNULATED SCREWS    Surgeon(s):  Jocelyne Escoto MD    Surgical Assistant: Surg Asst-1: Enedina Murcia    Anesthesia: General     Estimated Blood Loss (mL): Minimal    Complications: None    Specimens: * No specimens in log *     Implants:   Implant Name Type Inv.  Item Serial No.  Lot No. LRB No. Used Action   SCR BNE BRUNILDA 6.5X85MM PT 20MM -- ASNIS III TI - SNA  SCR BNE BRUNILDA 6.5X85MM PT 20MM -- ASNIS III TI NA [employer] HOWM_WD NA Left 3 Implanted       Drains: * No LDAs found *    Findings: minimally displaced valgus impacted femoral neck fracture    Electronically Signed by Cori Chavez MD on 6/24/2022 at 5:33 PM

## 2022-06-24 NOTE — ED PROVIDER NOTES
68-year-old female with a past medical history of arthritis, CAD (MI with stent x2 to RCA), hypertension, hypercholesterolemia, and osteoporosis presents the ER today for evaluation of acute left hip pain after a mechanical fall that occurred around 745 this morning. Patient states that she was in the process of taking her medications, when she looked down on one of her bottles and saw a big bug that startled her, which caused her to fall backwards. She reports falling onto the ground with the majority of the impact being placed on her left hip. Since then, she has attempted to ambulate but has too much discomfort and weakness of her left lower extremity to bear weight. She denies any other areas of injury/pain. She explicitly denies any head injury. She is currently on DAPT with aspirin and Plavix; she last took these medications this morning. She last ate around 11:00 yesterday evening.            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 - has foot drop now    HX TONSILLECTOMY      MA CHEST SURGERY PROCEDURE UNLISTED      part of right lung removed for nodule - ?path         Family History:   Problem Relation Age of Onset   Cohen Stroke Mother         hemorrhagic stroke    Coronary Art Dis Mother     Hypertension Mother     Other Mother         hydrocephaleus/had shunt    Stroke Father     Coronary Art Dis Father     Heart Surgery Father     Other Father         subdural hematoma    Other Other         cousin \"never woke up after surgery\" - pt unaware of circumstance    Cancer Other         Distant relative with colon cancer    Cancer Paternal Aunt         colon       Social History     Socioeconomic History    Marital status: SINGLE     Spouse name: Not on file    Number of children: Not on file    Years of education: Not on file    Highest education level: Not on file   Occupational History    Not on file   Tobacco Use    Smoking status: Never Smoker    Smokeless tobacco: Never Used   Substance and Sexual Activity    Alcohol use: No    Drug use: Not on file    Sexual activity: Not on file   Other Topics Concern    Not on file   Social History Narrative    Not on file     Social Determinants of Health     Financial Resource Strain:     Difficulty of Paying Living Expenses: Not on file   Food Insecurity:     Worried About Running Out of Food in the Last Year: Not on file    Keke of Food in the Last Year: Not on file   Transportation Needs:     Lack of Transportation (Medical): Not on file    Lack of Transportation (Non-Medical):  Not on file   Physical Activity:     Days of Exercise per Week: Not on file    Minutes of Exercise per Session: Not on file   Stress:     Feeling of Stress : Not on file   Social Connections:     Frequency of Communication with Friends and Family: Not on file    Frequency of Social Gatherings with Friends and Family: Not on file    Attends Scientology Services: Not on file    Active Member of Clubs or Organizations: Not on file    Attends Club or Organization Meetings: Not on file   Vicki Marital Status: Not on file   Intimate Partner Violence:     Fear of Current or Ex-Partner: Not on file    Emotionally Abused: Not on file    Physically Abused: Not on file    Sexually Abused: Not on file   Housing Stability:     Unable to Pay for Housing in the Last Year: Not on file    Number of [place] in the Last Year: Not on file    Unstable Housing in the Last Year: Not on file         ALLERGIES: Duramorph [morphine (pf)] and Other plant, animal, environmental    Review of Systems   Constitutional: Negative for fever. HENT: Negative for sore throat. Eyes: Negative for visual disturbance. Respiratory: Negative for shortness of breath. Cardiovascular: Negative for palpitations. Gastrointestinal: Negative for vomiting. Genitourinary: Negative for dysuria. Musculoskeletal: Positive for arthralgias and gait problem. Skin: Negative for rash. Neurological: Negative for dizziness. Vitals:    06/24/22 1024 06/24/22 1026 06/24/22 1027   BP:  (!) 179/62    Pulse:  62    Resp:  16    Temp:  97.9 °F (36.6 °C)    SpO2:  99% 99%   Weight: 58.1 kg (128 lb)     Height: 5' 2\" (1.575 m)              Physical Exam  Vitals and nursing note reviewed. Constitutional:       General: She is not in acute distress. Appearance: Normal appearance. She is not ill-appearing. HENT:      Head: Normocephalic and atraumatic. Right Ear: External ear normal.      Left Ear: External ear normal.      Nose: Nose normal.      Mouth/Throat:      Mouth: Mucous membranes are moist.      Pharynx: Oropharynx is clear. Eyes:      General: No scleral icterus. Extraocular Movements: Extraocular movements intact. Conjunctiva/sclera: Conjunctivae normal.      Pupils: Pupils are equal, round, and reactive to light. Cardiovascular:      Rate and Rhythm: Normal rate and regular rhythm. Pulses: Normal pulses. Heart sounds: Normal heart sounds.    Pulmonary:      Effort: Pulmonary effort is normal.      Breath sounds: Normal breath sounds. Abdominal:      General: Abdomen is flat. Bowel sounds are normal.      Palpations: Abdomen is soft. Musculoskeletal:      Cervical back: Normal range of motion and neck supple. No rigidity. No muscular tenderness. Left hip: Tenderness present. Decreased range of motion. Decreased strength. Comments: Pain and mild rigidity with passive ROM of left hip joint. No appreciable shortening or external rotation of affected extremity. Intact sensation. 2+ PT pulse. Skin:     General: Skin is warm and dry. Coloration: Skin is not pale. Neurological:      General: No focal deficit present. Mental Status: She is alert and oriented to person, place, and time. Psychiatric:         Mood and Affect: Mood normal.         Behavior: Behavior normal.         Thought Content: Thought content normal.         Judgment: Judgment normal.          MDM      VITAL SIGNS:  Patient Vitals for the past 4 hrs:   Temp Pulse Resp BP SpO2   06/24/22 1154 -- 69 18 -- 99 %   06/24/22 1141 -- 69 -- -- --   06/24/22 1134 -- -- -- (!) 148/58 95 %   06/24/22 1100 -- -- -- (!) 146/62 95 %   06/24/22 1027 -- -- -- -- 99 %   06/24/22 1026 97.9 °F (36.6 °C) 62 16 (!) 179/62 99 %         LABS:  Recent Results (from the past 6 hour(s))   SAMPLES BEING HELD    Collection Time: 06/24/22 10:29 AM   Result Value Ref Range    SAMPLES BEING HELD LV.PST.RED     COMMENT        Add-on orders for these samples will be processed based on acceptable specimen integrity and analyte stability, which may vary by analyte.    CBC WITH AUTOMATED DIFF    Collection Time: 06/24/22 10:29 AM   Result Value Ref Range    WBC 12.2 (H) 3.6 - 11.0 K/uL    RBC 4.41 3.80 - 5.20 M/uL    HGB 13.9 11.5 - 16.0 g/dL    HCT 42.7 35.0 - 47.0 %    MCV 96.8 80.0 - 99.0 FL    MCH 31.5 26.0 - 34.0 PG    MCHC 32.6 30.0 - 36.5 g/dL    RDW 13.1 11.5 - 14.5 %    PLATELET 828 838 - 732 K/uL    MPV 11.5 8.9 - 12.9 FL    NRBC 0.2 (H) 0  WBC    ABSOLUTE NRBC 0.02 (H) 0.00 - 0.01 K/uL    NEUTROPHILS 84 (H) 32 - 75 %    LYMPHOCYTES 9 (L) 12 - 49 %    MONOCYTES 6 5 - 13 %    EOSINOPHILS 0 0 - 7 %    BASOPHILS 0 0 - 1 %    IMMATURE GRANULOCYTES 1 (H) 0.0 - 0.5 %    ABS. NEUTROPHILS 10.3 (H) 1.8 - 8.0 K/UL    ABS. LYMPHOCYTES 1.1 0.8 - 3.5 K/UL    ABS. MONOCYTES 0.7 0.0 - 1.0 K/UL    ABS. EOSINOPHILS 0.0 0.0 - 0.4 K/UL    ABS. BASOPHILS 0.0 0.0 - 0.1 K/UL    ABS. IMM. GRANS. 0.1 (H) 0.00 - 0.04 K/UL    DF AUTOMATED     PROTHROMBIN TIME + INR    Collection Time: 06/24/22 11:30 AM   Result Value Ref Range    INR 1.0 0.9 - 1.1      Prothrombin time 10.9 9.0 - 69.1 sec   METABOLIC PANEL, COMPREHENSIVE    Collection Time: 06/24/22 11:30 AM   Result Value Ref Range    Sodium 141 136 - 145 mmol/L    Potassium 4.5 3.5 - 5.1 mmol/L    Chloride 107 97 - 108 mmol/L    CO2 26 21 - 32 mmol/L    Anion gap 8 5 - 15 mmol/L    Glucose 97 65 - 100 mg/dL    BUN 17 6 - 20 MG/DL    Creatinine 0.81 0.55 - 1.02 MG/DL    BUN/Creatinine ratio 21 (H) 12 - 20      GFR est AA >60 >60 ml/min/1.73m2    GFR est non-AA >60 >60 ml/min/1.73m2    Calcium 9.1 8.5 - 10.1 MG/DL    Bilirubin, total 0.4 0.2 - 1.0 MG/DL    ALT (SGPT) 34 12 - 78 U/L    AST (SGOT) 34 15 - 37 U/L    Alk.  phosphatase 52 45 - 117 U/L    Protein, total 6.3 (L) 6.4 - 8.2 g/dL    Albumin 3.5 3.5 - 5.0 g/dL    Globulin 2.8 2.0 - 4.0 g/dL    A-G Ratio 1.3 1.1 - 2.2     TYPE & SCREEN    Collection Time: 06/24/22 11:30 AM   Result Value Ref Range    Crossmatch Expiration 06/27/2022,2359     ABO/Rh(D) O POSITIVE     Antibody screen PENDING    EKG, 12 LEAD, INITIAL    Collection Time: 06/24/22 11:53 AM   Result Value Ref Range    Ventricular Rate 59 BPM    Atrial Rate 59 BPM    P-R Interval 160 ms    QRS Duration 92 ms    Q-T Interval 406 ms    QTC Calculation (Bezet) 401 ms    Calculated P Axis 51 degrees    Calculated R Axis -39 degrees    Calculated T Axis 27 degrees    Diagnosis       Sinus bradycardia  Left axis deviation  Possible Anterior infarct , age undetermined  Abnormal ECG  When compared with ECG of 28-NOV-2021 15:53,  Non-specific change in ST segment in Lateral leads          IMAGING:  XR FEMUR LT 2 V   Final Result   Subcapital left femoral neck fracture. .      XR HIP LT W OR WO PELV 2-3 VWS   Final Result   Subcapital left femoral neck fracture. .      XR CHEST SNGL V   Final Result   No change. No acute abnormality. .                  XR KNEE LT 3 V    (Results Pending)   CT LOW EXT LT WO CONT    (Results Pending)         Medications During Visit:  Medications   ondansetron (ZOFRAN) injection 4 mg (4 mg IntraVENous Given 6/24/22 1110)   fentaNYL citrate (PF) injection 75 mcg (75 mcg IntraVENous Given 6/24/22 1110)         DECISION MAKING:  Anoop Frank is a 66 y.o. female who comes in as above. X remarkable for left femoral neck fracture. Patient neurovascularly intact; feeling better after IV fentanyl. Case discussed with orthopedic surgery who will evaluate the patient and plan for surgery likely later on today. IMPRESSION:  1. Other closed fracture of head or neck of left femur, initial encounter [facility])        DISPOSITION:  Admitted      [facility] for Admission  12:45 PM    ED Room Number: ER03/03  Patient Name and age:  Anoop Frank 66 y.o.  female  Working Diagnosis:   1. Other closed fracture of head or neck of left femur[facility]        COVID-19 Suspicion:  no  Sepsis present:  no  Reassessment needed: no  Code Status:  Full Code  Readmission: no  Isolation Requirements:  no  Recommended Level of Care:  med/surg  Department:[facility] ED - [phone]  Other:  Hip fx. Discussed with ortho. Likely getting surgery later today.

## 2022-06-24 NOTE — PROGRESS NOTES
SUBJECTIVE:     Hank Moses is a 66 y.o. female  evaluated for a left hip fracture after ground level fall this morning. The patient does not have dementia and they are able to verbally converse during the exam. Their mechanism of injury was ground level fall trying to step backwards away from a bug in her kitchen. The patient localizes their pain to left hip. This injury occured this AM.  Baseline ambulation status is w/o assistive devices and independent ADLs. Lives at home with her son. No antecedent hip pain prior to injury.  On ASA/plavix, last ate yesterday AM.     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 - has foot drop now    HX TONSILLECTOMY      HI CHEST SURGERY PROCEDURE UNLISTED      part of right lung removed for nodule - ?path        Medications :  See med reconciliation    Allergies :   Duramorph [morphine (pf)] and Other plant, animal, environmental     Social History :  Social History     Socioeconomic History    Marital status: SINGLE     Spouse name: Not on file    Number of children: Not on file    Years of education: Not on file    Highest education level: Not on file   Occupational History    Not on file   Tobacco Use    Smoking status: Never Smoker    Smokeless tobacco: Never Used   Substance and Sexual Activity    Alcohol use: No    Drug use: Not on file    Sexual activity: Not on file   Other Topics Concern    Not on file   Social History Narrative    Not on file     Social Determinants of Health     Financial Resource Strain:     Difficulty of Paying Living Expenses: Not on file   Food Insecurity:     Worried About Running Out of Food in the Last Year: Not on file    Keke of Food in the Last Year: Not on file   Transportation Needs:     Lack of Transportation (Medical): Not on file    Lack of Transportation (Non-Medical):  Not on file   Physical Activity:     Days of Exercise per Week: Not on file    Minutes of Exercise per Session: Not on file   Stress:     Feeling of Stress : Not on file   Social Connections:     Frequency of Communication with Friends and Family: Not on file    Frequency of Social Gatherings with Friends and Family: Not on file    Attends Gnosticist Services: Not on file    Active Member of Clubs or Organizations: Not on file    Attends Club or Organization Meetings: Not on file    Marital Status: Not on file   Intimate Partner Violence:     Fear of Current or Ex-Partner: Not on file    Emotionally Abused: Not on file    Physically Abused: Not on file    Sexually Abused: Not on file   Housing Stability:     Unable to Pay for Housing in the Last Year: Not on file    Number of [place] in the Last Year: Not on file    Unstable Housing in the Last Year: Not on file         FAMILY HISTORY :  Family History   Problem Relation Age of Onset    Stroke Mother         hemorrhagic stroke    Coronary Art Dis Mother     Hypertension Mother     Other Mother         hydrocephaleus/had shunt    Stroke Father     Coronary Art Dis Father     Heart Surgery Father     Other Father         subdural hematoma    Other Other         cousin \"never woke up after surgery\" - pt unaware of circumstance    Cancer Other         Distant relative with colon cancer    Cancer Paternal Aunt         colon         Objective:     Vitals :  Patient Vitals for the past 12 hrs:   BP Temp Pulse Resp SpO2 Height Weight   06/24/22 1530 (!) 143/56 -- 66 19 97 % -- --   06/24/22 1521 (!) 134/59 -- 64 19 97 % -- --   06/24/22 1506 (!) 126/54 -- 61 14 97 % -- --   06/24/22 1500 -- -- 61 -- -- -- --   06/24/22 1438 (!) 119/47 -- (!) 58 15 97 % -- --   06/24/22 1420 138/65 -- 68 10 97 % -- --   06/24/22 1338 (!) 144/48 -- 61 22 98 % -- --   06/24/22 1309 -- -- (!) 45 -- -- -- --   06/24/22 1245 (!) 133/50 -- (!) 57 19 99 % -- --   06/24/22 1233 (!) 134/56 -- (!) 59 18 99 % -- --   06/24/22 1218 (!) 130/52 -- (!) 54 13 99 % -- --   06/24/22 1203 (!) 137/59 -- (!) 53 13 99 % -- --   06/24/22 1154 -- -- 69 18 99 % -- --   06/24/22 1141 -- -- 69 -- -- -- --   06/24/22 1134 (!) 148/58 -- -- -- 95 % -- --   06/24/22 1100 (!) 146/62 -- -- -- 95 % -- --   06/24/22 1027 -- -- -- -- 99 % -- --   06/24/22 1026 (!) 179/62 97.9 °F (36.6 °C) 62 16 99 % -- --   06/24/22 1024 -- -- -- -- -- 5' 2\" (1.575 m) 58.1 kg (128 lb)       Alert and Oriented x 3  No Acute Respiratory Distress  WWP distally to all extremities     MSK Physical Exam :   Bilateral upper extremities are atraumatic and nonpainful to palpation/ROM    LEFT HIP:  Inspection: Skin intact without discoloration, no obvious deformity  Palpation: TTP about the hip. Nontender to palpation knee/leg/ankle/foot. Compartments are soft and compressible  Sensation: 2/2 SILT SPN/DPN/T/S/S nerve distributions  Motor: gross motor intact distally   Unable to actively SLR secondary to pain   Vascular:  Toes WWP, BCR, 2+ DP    RIGHT HIP:  Inspection: Skin intact without discoloration, No deformity noted  Palpation: Nontender to palpation hip/thigh/knee/leg/ankle/foot. Compartments are soft and compressible  Sensation: 2/2 SILT SPN/DPN/T/S/S nerve distributions  Motor: baseline foot drop otherwise motor intact  +SLR, no pain with logroll or axial load   Vascular: Toes WWP, BCR, 2+ DP      Labs :   Recent Labs     06/24/22  1130 06/24/22  1029   HGB  --  13.9   HCT  --  42.7   INR 1.0  --      --    K 4.5  --      --    CO2 26  --    BUN 17  --    CREA 0.81  --    GLU 97  --      Plain films of the left hip and CT scan independently reviewed by me show a minimally displaced valgus impacted femoral neck fracture     CT:  EXAM: CT LOW EXT LT WO CONT     INDICATION: Further eval femur fracture      COMPARISON: Left femur radiographs 6/24/2022     TECHNIQUE: Helical CT of the left femur with coronal and sagittal reformats. Images reviewed in soft tissue and bone windows. CT dose reduction was achieved  through the use of a standardized protocol tailored for this examination and  automatic exposure control for dose modulation.     CONTRAST: None.     FINDINGS: Bones: Redemonstrated acute nondisplaced transcervical left femoral  neck fracture with mild impaction. Maintained alignment at the hip and knee  joints     Joint fluid: None.     Articulations: Severe left hip osteoarthritis. Mild pubic symphysis arthropathy. Mild tricompartmental left knee osteoarthritis     Tendons: No full-thickness tendon tear.     Muscles: No intramuscular hematoma. No focal atrophy.     Soft tissues: Atherosclerosis. Right SFA stent. Hysterectomy. Distended bladder. Particulates coli.     IMPRESSION  Redemonstrated acute left femoral neck fracture     ASSESSMENT :  71yoF w/a minimally displaced mildly valgus impacted femoral neck fracture     - NWB LLE, bedrest  - admitted to medicine   - cardiology cleared for OR   - hold anti-coagulation today  - NPO    - I had a lengthy discussion with the patient regarding left hip fracture.   I explained that given that this is a minimally displaced fracture I would recommend surgical treatment with percutaneous pinning. We did discuss the operative versus nonoperative treatment options available as well as the risks and benefits to each. I explained that nonoperative treatment involves several months of bedrest and generally can lead to further medical problems and has a significantly increased morbidity/mortality. Given the risk with nonoperative management and the benefits with operative treatment the patient has opted for surgical management of this hip fracture. We discussed the risk benefits and alternatives to hip pinning. I explained that the risks include but are not limited to pain, bleeding, infection, need for future surgeries, avascular necrosis, continued hip pain, hardware failure, malunion, nonunion, iatrogenic fracture/stress fracture, DVT/PE, and anesthesia risks. The morbidity and mortality of hip fractures were discussed with the patient and their family. Risks discussed include a need for upgrade in level of care and need for additional assistive devices and being less ambulatory. Mortality risks discussed were up to 10% thirty day mortality  and up to 25% one year mortality. After considering the risks and discussing it thoroughly together today the patient has opted to move forward with surgical management of this fracture. At patients request I also discussed the above plan and risks etc with patients son. - plan is for left hip CRPP today                Radha Hugo M.D.   [facility]   Office: [phone]  Medical Staff: Willie Borrego

## 2022-06-24 NOTE — ANESTHESIA POSTPROCEDURE EVALUATION
Procedure(s):  HIP PERCUTANEOUS PINNING CANNULATED SCREWS. general    Anesthesia Post Evaluation      Multimodal analgesia: multimodal analgesia not used between 6 hours prior to anesthesia start to PACU discharge  Patient location during evaluation: PACU  Patient participation: complete - patient participated  Level of consciousness: awake  Pain management: adequate  Airway patency: patent  Anesthetic complications: no  Cardiovascular status: acceptable, blood pressure returned to baseline and hemodynamically stable  Respiratory status: acceptable  Hydration status: acceptable  Post anesthesia nausea and vomiting:  controlled      INITIAL Post-op Vital signs:   Vitals Value Taken Time   /57 06/24/22 1840   Temp 36.7 °C (98 °F) 06/24/22 1755   Pulse 72 06/24/22 1845   Resp 12 06/24/22 1845   SpO2 99 % 06/24/22 1845   Vitals shown include unvalidated device data.

## 2022-06-24 NOTE — PROGRESS NOTES
[facility]: MED RECONCILIATION    Information obtained from: Patient     Allergies: Duramorph [morphine (pf)] and Other plant, animal, environmental    Prior to Admission Medications:     Medication Documentation Review Audit       Reviewed by ANGEL GaldamezD (Pharmacist) on 06/24/22 at 1336      Medication Sig Documenting Provider Last Dose Status Taking?   aspirin delayed-release 81 mg tablet Take 81 mg by mouth four (4) times daily (with meals). Provider, Historical 6/24/2022 AM Active Yes   azelastine (ASTELIN) 137 mcg (0.1 %) nasal spray 1 [place] by Both Nostrils route two (2) times daily as needed for Rhinitis. Use in each nostril as directed Provider, Historical  Active Yes   cholecalciferol (VITAMIN D3) 1,000 unit tablet Take 1,000 Units by mouth daily. Provider, Historical  Active Yes   clopidogrel (PLAVIX) 75 mg tab Take 75 mg by mouth daily. Provider, Historical 6/24/2022 Active Yes   ezetimibe (Zetia) 10 mg tablet Take 10 mg by mouth daily. Provider, Historical 6/23/2022 Active Yes   levothyroxine (SYNTHROID) 50 mcg tablet Take 50 mcg by mouth Daily (before breakfast). Provider, Historical 6/24/2022 Active Yes   loratadine (Claritin) 10 mg tablet Take 10 mg by mouth daily as needed allergies  Provider, Historical  Active Yes   losartan (COZAAR) 50 mg tablet Take 50 mg by mouth daily as needed (SBP > 120). Provider, Historical 6/23/2022 Active Yes   metoprolol tartrate (LOPRESSOR) 25 mg tablet Take 25 mg by mouth two (2) times a day. Provider, Historical 6/24/2022 AM Active Yes              omega 3-DHA-EPA-fish oil 1,000 mg (120 mg-180 mg) capsule Take 1 Capsule by mouth daily. Provider, Historical  Active Yes   rosuvastatin (CRESTOR) 10 mg tablet Take 10 mg by mouth nightly. Provider, Historical 6/23/2022 Active Yes   vitamin Z-O-G-lutein-minerals (OCUVITE) tablet Take 1 Tablet by mouth daily. Provider, Historical  Active Yes   zoledronic acid (RECLAST) 5 mg/100 mL pgbk 5 mg by IntraVENous route once. Provider, Historical Last year Active Yes                  [address] [facility]   Contact: [phone]

## 2022-06-24 NOTE — PERIOP NOTES
TRANSFER - OUT REPORT:    Verbal report given to Nina(name) on Mariaelena Reynolds  being transferred to 4th floor(unit) for routine post - op       Report consisted of patients Situation, Background, Assessment and   Recommendations(SBAR). Information from the following report(s) SBAR and OR Summary was reviewed with the receiving nurse. Lines:   Peripheral IV 06/24/22 Right Antecubital (Active)        Opportunity for questions and clarification was provided.       Patient transported with:   Registered Nurse

## 2022-06-25 LAB
ANION GAP SERPL CALC-SCNC: 8 MMOL/L (ref 5–15)
BASOPHILS # BLD: 0 K/UL (ref 0–0.1)
BASOPHILS NFR BLD: 0 % (ref 0–1)
BUN SERPL-MCNC: 11 MG/DL (ref 6–20)
BUN/CREAT SERPL: 14 (ref 12–20)
CALCIUM SERPL-MCNC: 8.3 MG/DL (ref 8.5–10.1)
CHLORIDE SERPL-SCNC: 108 MMOL/L (ref 97–108)
CO2 SERPL-SCNC: 25 MMOL/L (ref 21–32)
CREAT SERPL-MCNC: 0.81 MG/DL (ref 0.55–1.02)
DIFFERENTIAL METHOD BLD: ABNORMAL
EOSINOPHIL # BLD: 0 K/UL (ref 0–0.4)
EOSINOPHIL NFR BLD: 0 % (ref 0–7)
ERYTHROCYTE [DISTWIDTH] IN BLOOD BY AUTOMATED COUNT: 12.7 % (ref 11.5–14.5)
GLUCOSE SERPL-MCNC: 139 MG/DL (ref 65–100)
HCT VFR BLD AUTO: 37.8 % (ref 35–47)
HGB BLD-MCNC: 12.2 G/DL (ref 11.5–16)
IMM GRANULOCYTES # BLD AUTO: 0.1 K/UL (ref 0–0.04)
IMM GRANULOCYTES NFR BLD AUTO: 0 % (ref 0–0.5)
LYMPHOCYTES # BLD: 0.5 K/UL (ref 0.8–3.5)
LYMPHOCYTES NFR BLD: 4 % (ref 12–49)
MAGNESIUM SERPL-MCNC: 1.8 MG/DL (ref 1.6–2.4)
MCH RBC QN AUTO: 31 PG (ref 26–34)
MCHC RBC AUTO-ENTMCNC: 32.3 G/DL (ref 30–36.5)
MCV RBC AUTO: 96.2 FL (ref 80–99)
MONOCYTES # BLD: 0.3 K/UL (ref 0–1)
MONOCYTES NFR BLD: 2 % (ref 5–13)
NEUTS SEG # BLD: 12.2 K/UL (ref 1.8–8)
NEUTS SEG NFR BLD: 93 % (ref 32–75)
NRBC # BLD: 0 K/UL (ref 0–0.01)
NRBC BLD-RTO: 0 PER 100 WBC
PLATELET # BLD AUTO: 184 K/UL (ref 150–400)
PMV BLD AUTO: 11 FL (ref 8.9–12.9)
POTASSIUM SERPL-SCNC: 3.9 MMOL/L (ref 3.5–5.1)
RBC # BLD AUTO: 3.93 M/UL (ref 3.8–5.2)
SODIUM SERPL-SCNC: 141 MMOL/L (ref 136–145)
WBC # BLD AUTO: 13.1 K/UL (ref 3.6–11)

## 2022-06-25 PROCEDURE — 94761 N-INVAS EAR/PLS OXIMETRY MLT: CPT

## 2022-06-25 PROCEDURE — 85025 COMPLETE CBC W/AUTO DIFF WBC: CPT

## 2022-06-25 PROCEDURE — 97530 THERAPEUTIC ACTIVITIES: CPT

## 2022-06-25 PROCEDURE — 83735 ASSAY OF MAGNESIUM: CPT

## 2022-06-25 PROCEDURE — 74011250637 HC RX REV CODE- 250/637: Performed by: STUDENT IN AN ORGANIZED HEALTH CARE EDUCATION/TRAINING PROGRAM

## 2022-06-25 PROCEDURE — 97535 SELF CARE MNGMENT TRAINING: CPT | Performed by: OCCUPATIONAL THERAPIST

## 2022-06-25 PROCEDURE — 74011250637 HC RX REV CODE- 250/637: Performed by: INTERNAL MEDICINE

## 2022-06-25 PROCEDURE — 36415 COLL VENOUS BLD VENIPUNCTURE: CPT

## 2022-06-25 PROCEDURE — 97530 THERAPEUTIC ACTIVITIES: CPT | Performed by: OCCUPATIONAL THERAPIST

## 2022-06-25 PROCEDURE — 97165 OT EVAL LOW COMPLEX 30 MIN: CPT | Performed by: OCCUPATIONAL THERAPIST

## 2022-06-25 PROCEDURE — 74011250637 HC RX REV CODE- 250/637: Performed by: NURSE PRACTITIONER

## 2022-06-25 PROCEDURE — 97161 PT EVAL LOW COMPLEX 20 MIN: CPT

## 2022-06-25 PROCEDURE — 65270000029 HC RM PRIVATE

## 2022-06-25 PROCEDURE — 74011000250 HC RX REV CODE- 250: Performed by: INTERNAL MEDICINE

## 2022-06-25 PROCEDURE — 77010033678 HC OXYGEN DAILY

## 2022-06-25 PROCEDURE — 74011000250 HC RX REV CODE- 250: Performed by: NURSE PRACTITIONER

## 2022-06-25 PROCEDURE — 74011000250 HC RX REV CODE- 250: Performed by: STUDENT IN AN ORGANIZED HEALTH CARE EDUCATION/TRAINING PROGRAM

## 2022-06-25 PROCEDURE — 74011250636 HC RX REV CODE- 250/636: Performed by: STUDENT IN AN ORGANIZED HEALTH CARE EDUCATION/TRAINING PROGRAM

## 2022-06-25 PROCEDURE — 80048 BASIC METABOLIC PNL TOTAL CA: CPT

## 2022-06-25 PROCEDURE — 97116 GAIT TRAINING THERAPY: CPT

## 2022-06-25 RX ADMIN — SODIUM CHLORIDE, PRESERVATIVE FREE 10 ML: 5 INJECTION INTRAVENOUS at 21:22

## 2022-06-25 RX ADMIN — SODIUM CHLORIDE 125 ML/HR: 9 INJECTION, SOLUTION INTRAVENOUS at 03:48

## 2022-06-25 RX ADMIN — OXYCODONE 5 MG: 5 TABLET ORAL at 23:33

## 2022-06-25 RX ADMIN — DOCUSATE SODIUM 50MG AND SENNOSIDES 8.6MG 1 TABLET: 8.6; 5 TABLET, FILM COATED ORAL at 09:39

## 2022-06-25 RX ADMIN — ACETAMINOPHEN 650 MG: 325 TABLET ORAL at 18:15

## 2022-06-25 RX ADMIN — Medication 1 TABLET: at 18:15

## 2022-06-25 RX ADMIN — ENOXAPARIN SODIUM 40 MG: 100 INJECTION SUBCUTANEOUS at 09:39

## 2022-06-25 RX ADMIN — CEFAZOLIN 2 G: 1 INJECTION, POWDER, FOR SOLUTION INTRAMUSCULAR; INTRAVENOUS at 09:40

## 2022-06-25 RX ADMIN — Medication 1 TABLET: at 11:11

## 2022-06-25 RX ADMIN — SODIUM CHLORIDE, PRESERVATIVE FREE 10 ML: 5 INJECTION INTRAVENOUS at 21:09

## 2022-06-25 RX ADMIN — ACETAMINOPHEN 650 MG: 325 TABLET ORAL at 13:12

## 2022-06-25 RX ADMIN — DOCUSATE SODIUM 50MG AND SENNOSIDES 8.6MG 1 TABLET: 8.6; 5 TABLET, FILM COATED ORAL at 18:15

## 2022-06-25 RX ADMIN — OXYCODONE 5 MG: 5 TABLET ORAL at 06:35

## 2022-06-25 RX ADMIN — OXYCODONE 5 MG: 5 TABLET ORAL at 10:48

## 2022-06-25 RX ADMIN — Medication 10 ML: at 21:09

## 2022-06-25 RX ADMIN — SODIUM CHLORIDE 125 ML/HR: 9 INJECTION, SOLUTION INTRAVENOUS at 13:33

## 2022-06-25 RX ADMIN — POLYETHYLENE GLYCOL 3350 17 G: 17 POWDER, FOR SOLUTION ORAL at 09:39

## 2022-06-25 RX ADMIN — EZETIMIBE 10 MG: 10 TABLET ORAL at 09:39

## 2022-06-25 RX ADMIN — Medication 1 TABLET: at 09:40

## 2022-06-25 RX ADMIN — SODIUM CHLORIDE, PRESERVATIVE FREE 10 ML: 5 INJECTION INTRAVENOUS at 18:17

## 2022-06-25 RX ADMIN — LEVOTHYROXINE SODIUM 50 MCG: 0.05 TABLET ORAL at 06:07

## 2022-06-25 RX ADMIN — SODIUM CHLORIDE, PRESERVATIVE FREE 10 ML: 5 INJECTION INTRAVENOUS at 06:27

## 2022-06-25 RX ADMIN — Medication 1 TABLET: at 09:39

## 2022-06-25 RX ADMIN — Medication 10 ML: at 18:17

## 2022-06-25 RX ADMIN — OXYCODONE 5 MG: 5 TABLET ORAL at 18:31

## 2022-06-25 RX ADMIN — ACETAMINOPHEN 650 MG: 325 TABLET ORAL at 06:06

## 2022-06-25 RX ADMIN — ROSUVASTATIN CALCIUM 10 MG: 10 TABLET, FILM COATED ORAL at 21:08

## 2022-06-25 NOTE — PROGRESS NOTES
Problem: Mobility Impaired (Adult and Pediatric)  Goal: *Acute Goals and Plan of Care (Insert Text)  Description: FUNCTIONAL STATUS PRIOR TO ADMISSION: Patient was independent and active without use of DME.    HOME SUPPORT PRIOR TO ADMISSION: The patient lived with son but did not require assist.    Physical Therapy Goals  Initiated 6/25/2022  1. Patient will move from supine to sit and sit to supine  in bed with minimal assistance/contact guard assist within 7 day(s). 2.  Patient will transfer from bed to chair and chair to bed with minimal assistance/contact guard assist using the least restrictive device within 7 day(s). 3.  Patient will perform sit to stand with supervision/set-up within 7 day(s). 4.  Patient will ambulate with supervision/set-up for 75 feet with the least restrictive device within 7 day(s). 5.  Patient will ascend/descend 5 stairs with unilateral handrail(s) with minimal assistance/contact guard assist within 7 day(s). Outcome: Progressing Towards Goal  Note:   PHYSICAL THERAPY EVALUATION  Patient: Margy Aase (17 y.o. female)  Date: 6/25/2022  Primary Diagnosis: Femur fracture (HCC) [S72.90XA]  Procedure(s) (LRB):  HIP PERCUTANEOUS PINNING CANNULATED SCREWS (Left) 1 Day Post-Op   Precautions:   Fall,WBAT      ASSESSMENT  Based on the objective data described below, the patient presents day 1 s/p ORIF secondary to GLF resulting in L femoral neck fx. PMHx significant for CAD, HTN, and osteoporosis. Pt agreeable to eval, vitals stable throughout session. Pt was independent prior to admission and living with son, however daughter reports that pt has previously received recommendation to utilize walker and is non compliant. Pt exhibited highly anxious behaviors throughout session and benefits from second assist and significant cuing for sequencing to support reassurance. Pt required modA x1 for all bed mobility, Van x2 for sit <> stand with RW, and Van x2 to ambulate 5' with RW.  Pt to be seen BID while in house, and recommend home health PT upon D/C pending progress to support safe home access. Current Level of Function Impacting Discharge (mobility/balance): 5' Van x2 with RW    Functional Outcome Measure: The patient scored Total Score: 7/28 on the Tinetti outcome measure which is indicative of high fall risk. Other factors to consider for discharge: need for reassurance with mobility, significant assistance with bed and OOB mobility     Patient will benefit from skilled therapy intervention to address the above noted impairments. PLAN :  Recommendations and Planned Interventions: bed mobility training, transfer training, gait training, therapeutic exercises, neuromuscular re-education, patient and family training/education, and therapeutic activities      Frequency/Duration: Patient will be followed by physical therapy:  twice daily to address goals. Recommendation for discharge: (in order for the patient to meet his/her long term goals)  To be determined: HHPT pending progress, IPR may be more appropriate if pt is not safe to enter/exit home    This discharge recommendation:  A follow-up discussion with the attending provider and/or case management is planned    IF patient discharges home will need the following DME: patient owns DME required for discharge (rolling walker)         SUBJECTIVE:   Patient stated I dont think I can do this.  re: getting OOB    OBJECTIVE DATA SUMMARY:   HISTORY:    Past Medical History:   Diagnosis Date    Arthritis     right knee - cortison injection    CAD (coronary artery disease)     heart attack/stents    Cancer (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    Hypertension     Ill-defined condition     increased cholesterol    Ill-defined condition     osteoporosis    Ill-defined condition     dysphagia    Ill-defined condition     hiatal hernia    Thyroid disease      Past Surgical History:   Procedure Laterality Date    COLONOSCOPY N/A 8/30/2016    COL[facility] performed [facility] Charlotte Del Valle [facility] at [facility] ENDOSCOPY     [address]      x2    HX CHOLECYSTECTOMY      HX COLONOSCOPY      2009, 2016    HX DILATION AND CURETTAGE      HX GI      colonoscopy    HX HEART CATHETERIZATION      stents/has a significant blockage that is being watched    HX HEENT      thyroidectomy    HX HYSTERECTOMY      HX ORTHOPAEDIC      anterior muscle removed d/t 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 - has foot drop now    HX TONSILLECTOMY      SD CHEST SURGERY PROCEDURE UNLISTED      part of right lung removed for nodule - ?path       Personal factors and/or comorbidities impacting plan of care: see above    Home Situation  Home Environment: Private residence  # Steps to Enter: 5  Rails to Enter: Yes  Hand Rails : Right  One/Two Story Residence: Two story, live on 1st floor  # of Interior Steps: 14  Living Alone: No  Support Systems: Child(mayi)  Current DME Used/Available at Home: Walker, rolling    EXAMINATION/PRESENTATION/DECISION MAKING:   Critical Behavior:  Neurologic State: Alert,Drowsy  Orientation Level: Oriented X4  Cognition: Follows commands    Range Of Motion:  AROM: Grossly decreased, non-functional    PROM: Grossly decreased, non-functional     Strength:    Strength: Grossly decreased, non-functional     Tone & Sensation:   Tone: Normal    Sensation: Intact    Coordination:  Coordination: Grossly decreased, non-functional    Functional Mobility:  Bed Mobility:  Rolling: Moderate assistance  Supine to Sit: Moderate assistance  Sit to Supine: Moderate assistance  Scooting:  Moderate assistance  Transfers:  Sit to Stand: Minimum assistance;Assist x2  Stand to Sit: Minimum assistance;Assist x2        Balance:   Sitting: Impaired  Sitting - Static: Fair (occasional)  Sitting - Dynamic: Poor (constant support)  Standing: Impaired  Standing - Static: Fair  Standing - Dynamic : Poor  Ambulation/Gait Training:  Distance (ft): 5 Feet (ft)  Assistive Device: Ever Wick rolling;Gait belt  Ambulation - Level of Assistance: Minimal assistance;Assist x2; Additional time  Left Side Weight Bearing: As tolerated    Functional Measure:  Tinetti test:    Sitting Balance: 1  Arises: 0  Attempts to Rise: 0  Immediate Standing Balance: 1  Standing Balance: 1  Nudged: 1  Eyes Closed: 0  Turn 360 Degrees - Continuous/Discontinuous: 0  Turn 360 Degrees - Steady/Unsteady: 0  Sitting Down: 1  Balance Score: 5 Balance total score  Indication of Gait: 0  R Step Length/Height: 0  L Step Length/Height: 0  R Foot Clearance: 0  L Foot Clearance: 0  Step Symmetry: 0  Step Continuity: 0  Path: 1  Trunk: 1  Walking Time: 0  Gait Score: 2 Gait total score  Total Score: 7/28 Overall total score         Tinetti Tool Score Risk of Falls  <19 = High Fall Risk  19-24 = Moderate Fall Risk  25-28 = Low Fall Risk  Tinetti ME. Performance-Oriented Assessment of Mobility Problems in Elderly Patients. [facility]; [id number].  (Scoring Description: PT Bulletin Feb. 10, 1993)    Older adults: Aretha Arias et al, 2009; n = 1000 [facility] elderly evaluated with ABC, RUSLAN, ADL, and IADL)  · Mean RUSLAN score for males aged 69-68 years = 26.21(3.40)  · Mean RUSLAN score for females age 69-68 years = 25.16(4.30)  · Mean RUSLAN score for males over 80 years = 23.29(6.02)  · Mean RUSLAN score for females over 80 years = 17.20(8.32)        Physical Therapy Evaluation Charge Determination   History Examination Presentation Decision-Making   MEDIUM  Complexity : 1-2 comorbidities / personal factors will impact the outcome/ POC  LOW Complexity : 1-2 Standardized tests and measures addressing body structure, function, activity limitation and / or participation in recreation  LOW Complexity : Stable, uncomplicated  Other outcome measures tinetti 7/28  HIGH       Based on the above components, the patient evaluation is determined to be of the following complexity level: LOW     Pain Rating:  No reports throughout session, significant fear of pain impacts mobility. Pain medication pre-coordinated with RN. Activity Tolerance:   Poor, SpO2 stable on RA, and requires frequent rest breaks      After treatment patient left in no apparent distress:   Supine in bed, Call bell within reach, Bed / chair alarm activated, Caregiver / family present, Side rails x 3, and all needs within reach    COMMUNICATION/EDUCATION:   The patients plan of care was discussed with: Registered nurse. Fall prevention education was provided and the patient/caregiver indicated understanding., Patient/family have participated as able in goal setting and plan of care. , and Patient/family agree to work toward stated goals and plan of care.     Thank you for this referral.  Duane Whiteside, PT, DPT   Time Calculation: 53 mins

## 2022-06-25 NOTE — PROGRESS NOTES
Orthopedic NP Progress Note  Post Op day: 1 Day Post-Op    June 25, 2022 10:10 AM     Deepak Case    Attending Physician: Treatment Team: Attending Provider: Barbra Singh MD; Consulting Provider: Miguel Marie NP; Consulting Provider: Eric Downey MD; Consulting Provider: Andrea Green NP; Primary Nurse: Odin Gracia, RN; Physical Therapist: Citlaly Bhatti PT; Care Manager: Ruthie Stanley     Vital Signs:    Patient Vitals for the past 8 hrs:   BP Temp Pulse Resp SpO2   06/25/22 0954 (!) 145/66 97.8 °F (36.6 °C) 66 14 94 %   06/25/22 0343 (!) 113/57 97.4 °F (36.3 °C) 62 15 98 %     BMI (calculated): 23.4 (06/24/22 1024)    Intake/Output:  No intake/output data recorded. 06/23 1901 - 06/25 0700  In: 2395 [P.O.:100; I.V.:2295]  Out: 1800 [Urine:1800]    Pain Control:   Pain Assessment  Pain Scale 1: Numeric (0 - 10)  Pain Intensity 1: 0  Pain Location 1: Hip  Pain Orientation 1: Left  Pain Description 1: Aching,Constant  Pain Intervention(s) 1: Repositioned    LAB:    Recent Labs     06/25/22  0417   HCT 37.8   HGB 12.2     Lab Results   Component Value Date/Time    Sodium 141 06/25/2022 04:17 AM    Potassium 3.9 06/25/2022 04:17 AM    Chloride 108 06/25/2022 04:17 AM    CO2 25 06/25/2022 04:17 AM    Glucose 139 (H) 06/25/2022 04:17 AM    BUN 11 06/25/2022 04:17 AM    Creatinine 0.81 06/25/2022 04:17 AM    Calcium 8.3 (L) 06/25/2022 04:17 AM       Subjective:  Deepak Case is a 66 y.o. female s/p a  Procedure(s):  HIP PERCUTANEOUS PINNING CANNULATED SCREWS   Procedure(s):  HIP PERCUTANEOUS PINNING CANNULATED SCREWS. Tolerating diet. Has been up with PT, pain well managed with PO meds        Objective: General: alert, cooperative, no distress. Neuro/Vascular: CNS Intact. Sensation stable. Brisk cap refill, 2+ pulses UE/LE  Musculoskeletal:  FROM UE/LE, NVI calf soft without edema. Skin: Incision - clean, dry and intact.  No significant erythema or swelling, + ecchymosis surrounding incision as expected    Dressing: clean, dry, and intact    Jiménez - n  Drain - n       PT/OT:   Gait:                      Assessment:    s/p Procedure(s):  HIP PERCUTANEOUS PINNING CANNULATED SCREWS    Principal Problem:    Femur fracture ([address] (6/24/2022)    Active Problems:    Thyroid disease ()      Ill-defined condition ()      Overview: osteoporosis      CAD (coronary artery disease) ()      Overview: heart attack/stents      History of CVA (cerebrovascular accident) (6/24/2022)      Overview: Noted on MRI in 11/2021 => chronic infarct L centrum semiovale         Plan:   -  Continue PT/OT - WBAT LLE with walker   -  Continue established methods of pain control  -  VTE Prophylaxes - TEDS &/or SCDs with lovenox, may transition back to Plavix and ASA if preferred       Discharge To:  TBD    Signed By: Kavon Solis NP    Orthopedic Nurse Practitioner

## 2022-06-25 NOTE — PROGRESS NOTES
Problem: Falls - Risk of  Goal: *Absence of Falls  Description: Document Teto Bare Fall Risk and appropriate interventions in the flowsheet.   Outcome: Progressing Towards Goal  Note: Fall Risk Interventions:            Medication Interventions: Bed/chair exit alarm    Elimination Interventions: Call light in reach,Bed/chair exit alarm    History of Falls Interventions: Bed/chair exit alarm         Problem: Patient Education: Go to Patient Education Activity  Goal: Patient/Family Education  Outcome: Progressing Towards Goal     Problem: Patient Education: Go to Patient Education Activity  Goal: Patient/Family Education  Outcome: Progressing Towards Goal

## 2022-06-25 NOTE — PROGRESS NOTES
Reason for Admission:  Fractured hip                     RUR Score:   7% LOW                  Plan for utilizing home health:    No plan at this time. Has had [facility] last fall but said she did not like them. Daughter said \"rubbed her the wrong way. Probably another one if needed is fine\"      PCP: First and Last name:  Joi Horner MD     Name of Practice:    Are you a current patient: Yes/No: yes   Approximate date of last visit:    Can you participate in a virtual visit with your PCP: NO                    Current Advanced Directive/Advance Care Plan: Full Code      Healthcare Decision Maker:   Click here to complete [employer] including selection of the Healthcare Decision Maker Relationship (ie \"Primary\")             Primary Decision Maker: Alfred Days - [phone]   Daughter Loly Reasons [id number]    Patient lives with her son in a two story home with five steps to enter and 13 steps inside. Elissa Cerrato and tramaine hip, therapy recommends IRF for rehab after discharge. Patient and daughter acknowledged SNF for rehab will not be acceptable, prefer [facility] or [facility]. Declined referral to [facility]. Referrals sent. Transition of Care Plan:   1- Referrals sent to IRF per preferences identified  2- awaiting results of referral review  3- will need stretcher transport to IRF. Care Management Interventions  PCP Verified by CM:  Yes  Mode of Transport at Discharge: BLS  Transition of Care Consult (CM Consult): Discharge Planning  Physical Therapy Consult: Yes  Occupational Therapy Consult: Yes  Support Systems: Child(mayi)  Confirm Follow Up Transport: Other (see comment) (stretcher transport anticipated. )  The Patient and/or Patient Representative was Provided with a Choice of Provider and Agrees with the Discharge Plan?: Yes  Freedom of Choice List was Provided with Basic Dialogue that Supports the Patient's Individualized Plan of Care/Goals, Treatment Preferences and Shares the Quality Data Associated with the Providers?: Yes  Discharge Location  Patient Expects to be Discharged to[de-identified] Rehab hospital/unit acute

## 2022-06-25 NOTE — PROGRESS NOTES
Bedside shift change report given to Talib (oncoming nurse) by Dalton Donnelly (offgoing nurse). Report included the following information SBAR, Kardex, Accordion, Recent Results and Med Rec Status.

## 2022-06-25 NOTE — PROGRESS NOTES
Progress Note          Pt Name  Sumi Smith   Date of Birth 1950   Medical Record Number  [id number]      Age  66 y.o. PCP Med Cerda MD   Admit date:  6/24/2022    Room Number  414/01  @ [facility]   Date of Service  6/25/2022     Admission Diagnoses:  Femur fracture ([address]     History of present illness (copied from H&P)  \" Ms. Dave Pope is a 66 y.o.  female with PMH of CAD, HTN, osteoporosis admitted s/p fall for L femoral neck fracture. Pt reports the fall was mechanical. Denies preceeding CP, SOB, dizziness, palpitations. Prior to this event was independent and denied exertional CP, SOB. In fact had recently spent 2 hours mowing her lawn with push mower without symptoms. 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 Hypothyroidism  S/p total thyroidectomy . -chronic   Continue supplementation as is     Hx of CVA in the past -  Continue supportive care     Osteoporosis  -pt received last injection about a year ago by her endcrinologist   Add VIt D and Ca+ supplementation     Hx of PAD -s/p Right Sup Fem artery PCI   Continue Plavix as is     Hx of Sarcoma right leg   S/p wide resection   Hx of possible recurrence in Right lung   S/p wedge resection of right lung       Body mass index is 23.41 kg/m².  -             CODE STATUS   Full     Functional Status  Pt lives with her family     Surrogate decision maker:  Pt's son      Prophylaxis   Lovenox   Discharge Plan:  SNF/LTC,     Misc   Benefit:  [place]or: [place] / Plan: [place] MED[place]ARE PART A & B / Product Type: Medicare /    Isolation :  There are currently no Active Isolations   ADT status:  INPATIENT      Query   None noted today    Prognosis      Social issues  Date  Comment     06/25/22  16:44 PM No family or visitor here with the patient today                     Subjective Data     \"I want to go home \"  Review of Systems - History obtained from child and the patient  Respiratory ROS: no cough, shortness of breath, or wheezing  Cardiovascular ROS: no chest pain or dyspnea on exertion  Gastrointestinal ROS: no abdominal pain, change in bowel habits, or black or bloody stools  Musculoskeletal ROS: positive for - muscular weakness    Objective Data       Comments  Pleasant lady lying in bed in no distress   Her son is in the room      Patient Vitals for the past 24 hrs:   BP   06/25/22 1229 131/66   06/25/22 0954 (!) 145/66   06/25/22 0343 (!) 113/57   06/24/22 2235 134/74   06/24/22 1902 (!) 142/62   06/24/22 1835 (!) 145/56   06/24/22 1830 (!) 135/53   06/24/22 1825 (!) 139/57   06/24/22 1820 139/60   06/24/22 1815 (!) 144/51   06/24/22 1810 (!) 140/55   06/24/22 1805 130/70   06/24/22 1800 (!) 144/59   06/24/22 1755 (!) 136/54   06/24/22 1604 (!) 143/56   06/24/22 1530 (!) 143/56   06/24/22 1521 (!) 134/59   06/24/22 1506 (!) 126/54   06/24/22 1438 (!) 119/47   06/24/22 1420 138/65      Patient Vitals for the past 24 hrs:   Pulse   06/25/22 1229 71   06/25/22 0954 66   06/25/22 0343 62   06/24/22 2235 71   06/24/22 1902 67   06/24/22 1835 65   06/24/22 1830 82   06/24/22 1825 71   06/24/22 1820 75   06/24/22 1815 69   06/24/22 1810 79   06/24/22 1805 81   06/24/22 1800 73   06/24/22 1755 84   06/24/22 1604 67   06/24/22 1530 66   06/24/22 1521 64   06/24/22 1506 61   06/24/22 1500 61   06/24/22 1438 (!) 58   06/24/22 1420 68      Patient Vitals for the past 24 hrs:   Resp   06/25/22 1229 14   06/25/22 0954 14   06/25/22 0343 15   06/24/22 2235 16   06/24/22 1902 16   06/24/22 1835 15   06/24/22 1830 16   06/24/22 1825 12   06/24/22 1820 12   06/24/22 1815 13   06/24/22 1810 12   06/24/22 1805 11   06/24/22 1800 13   06/24/22 1755 10   06/24/22 1604 13   06/24/22 1530 19   06/24/22 1521 19   06/24/22 1506 14   06/24/22 1438 15   06/24/22 1420 10      Patient Vitals for the past 24 hrs:   Temp   06/25/22 1229 98.4 °F (36.9 °C)   06/25/22 0954 97.8 °F (36.6 °C)   06/25/22 0343 97.4 °F (36.3 °C)   06/24/22 2235 97 °F (36.1 °C)   06/24/22 1902 97.5 °F (36.4 °C)   06/24/22 1755 98 °F (36.7 °C)   06/24/22 1604 97.9 °F (36.6 °C)        SpO2 Readings from Last 6 Encounters:   06/25/22 93%   11/30/21 94%   08/29/19 99%   08/30/16 100%       O2 Flow Rate (L/min): 2 l/min  O2 Device: None (Room air) Body mass index is 23.41 kg/m². -  Wt Readings from Last 10 Encounters:   06/24/22 58.1 kg (128 lb)   11/30/21 62.8 kg (138 lb 7.2 oz)   08/29/19 63 kg (139 lb)   08/30/16 60.8 kg (134 lb)        Intake/Output Summary (Last 24 hours) at 6/25/2022 1356  Last data filed at 6/25/2022 0606  Gross per 24 hour   Intake 2395 ml   Output 1800 ml   Net 595 ml         Physical Exam:             General:  Alert, cooperative,   well noursished,   well developed,   appears stated age    Ears/Eyes:  Hearing intact  Sclera anicteric. Pupils equal   Mouth/Throat:  Mucous membranes I moist    Neck:     Lungs:  Chest excursion symmetrical   Auscultation B/L Symmetrical with   Vesicular breath sounds     No Crepitations noted         CVS:  Regular rate and rhythm   no  murmur,   No click, rub or gallop  S1 normal   S2 normal   Pedal pulses  b/l symmetrical   Abdomen:    Soft, non-tender  Bowel sounds normal  No distension      Extremities:  LLE dressed   Right foot drop is noted.    Previous surgical scar on the Right patella area noted   No cyanosis, jaundice  No edema noted   No sign of DVT/cord like lesion on palpation  No sign of acute trauma    Skin:    Skin color, texture, turgor normal. no acute rash or lesions    Lymph nodes:     Musculoskeletal Muscle bulk B/L symmetrical   Neuro Cranial nerves are intact, motor movement b/l symmetrical,   Sensory evaluation b/l symmetrical    Psych:  Alert and oriented,   normal mood & affect          Medications reviewed     Current Facility-Administered Medications   Medication Dose Route Frequency    0.9% sodium chloride infusion  75 mL/hr IntraVENous CONTINUOUS    sodium chloride (NS) flush 5-40 mL  5-40 mL IntraVENous Q8H    sodium chloride (NS) flush 5-40 mL  5-40 mL IntraVENous PRN    acetaminophen (TYLENOL) tablet 650 mg  650 mg Oral Q6H    oxyCODONE IR (ROXICODONE) tablet 2.5 mg  2.5 mg Oral Q4H PRN    oxyCODONE IR (ROXICODONE) tablet 5 mg  5 mg Oral Q4H PRN    fentaNYL citrate (PF) injection 25 mcg  25 mcg IntraVENous Q4H PRN    sodium chloride (NS) flush 5-40 mL  5-40 mL IntraVENous Q8H    sodium chloride (NS) flush 5-40 mL  5-40 mL IntraVENous PRN    azelastine (ASTELIN) 137mcg/spray nasal spray  1 Spray Both Nostrils BID PRN    ezetimibe (ZETIA) tablet 10 mg  10 mg Oral DAILY    levothyroxine (SYNTHROID) tablet 50 mcg  50 mcg Oral ACB    rosuvastatin (CRESTOR) tablet 10 mg  10 mg Oral QHS    vitamin W-J-E-lutein-minerals (OCUVITE) tablet 1 Tablet  1 Tablet Oral DAILY    hydrALAZINE (APRESOLINE) 20 mg/mL injection 10 mg  10 mg IntraVENous Q6H PRN    [Held by provider] aspirin chewable tablet 81 mg  81 mg Oral DAILY    [Held by provider] metoprolol tartrate (LOPRESSOR) tablet 25 mg  25 mg Oral Q12H    0.9% sodium chloride infusion  125 mL/hr IntraVENous CONTINUOUS    sodium chloride (NS) flush 5-40 mL  5-40 mL IntraVENous Q8H    sodium chloride (NS) flush 5-40 mL  5-40 mL IntraVENous PRN    naloxone (NARCAN) injection 0.4 mg  0.4 mg IntraVENous PRN    calcium-vitamin D (OS-ANTWAN +D3) 500 mg-200 unit per tablet 1 Tablet  1 Tablet Oral TID WITH MEALS    senna-docusate (PERICOLACE) 8.6-50 mg per tablet 1 Tablet  1 Tablet Oral BID    polyethylene glycol (MIRALAX) packet 17 g  17 g Oral DAILY    [START ON 6/26/2022] bisacodyL (DULCOLAX) suppository 10 mg  10 mg Rectal DAILY PRN    enoxaparin (LOVENOX) injection 40 mg  40 mg SubCUTAneous DAILY       Relevant other informations: Other medical conditions listed in [facility] problem list section; all of these and other pertinent data were taken into consideration when treatment plan is developed and customized to this patient's unique overall circumstances and needs. We have reviewed available old medical records within the constraints of this admission process. Data Review:   Recent Days:  All Micro Results     Procedure Component Value Units Date/Time    URINE CULTURE HOLD SAMPLE [[id number]] Collected: 06/24/22 1840    Order Status: Completed Specimen: Serum Updated: 06/24/22 1846     Urine culture hold       Urine on hold in Microbiology dept for 2 days. If unpreserved urine is submitted, it cannot be used for addtional testing after 24 hours, recollection will be required. Recent Labs     06/25/22 0417 06/24/22  1029   WBC 13.1* 12.2*   HGB 12.2 13.9   HCT 37.8 42.7    230     Recent Labs     06/25/22 0417 06/24/22  1130    141   K 3.9 4.5    107   CO2 25 26   * 97   BUN 11 17   CREA 0.81 0.81   CA 8.3* 9.1   MG 1.8  --    ALB  --  3.5   TBILI  --  0.4   ALT  --  34   INR  --  1.0      Lab Results   Component Value Date/Time    TSH 1.80 11/29/2021 02:00 AM            Care Plan discussed with:Patient/Family and Nurse   Other medical conditions are listed in the active hospital problem list section; these and other pertinent data were taken into consideration when the treatment plan was developed and customized to this patient's unique overall circumstances and needs. High complexity decision making was performed for this patient who is at high risk for decompensation with multiple organ involvement.    Today total floor/unit time was 40  minutes while caring for this patient and greater than 50% of that time was spent with patient (and/or family) coordinating patients clinical issues; this includes time spent during multidisciplinary rounds.         Cleveland Crowe MD MPH FACP [facility] Phy-Adv  6/25/2022       [delayed entry 6/26/2022 ]

## 2022-06-25 NOTE — PROGRESS NOTES
Problem: Self Care Deficits Care Plan (Adult)  Goal: *Acute Goals and Plan of Care (Insert Text)  Description: FUNCTIONAL STATUS PRIOR TO ADMISSION: Patient was independent and active without use of DME. Patient with hx of right foot drop, sx on right foot/LE in 2010, had sx on her left eye 3/30/22 and with continued blurry vision and at times double vision and nausea, has not mowed lawn in 2 years however performed a few weeks ago. Reports many near falls/catches herself since vision sx, takes time and moves slow at baseline due to foot drop    HOME SUPPORT: The patient lived with her son who works but did not require assist..      Occupational Therapy Goals  Initiated 6/25/2022  1. Patient will perform lower body ADLs with AE minimal assistance/contact guard assist within 7 day(s). 2.  Patient will perform upper body ADLs standing 5 mins without fatigue or LOB with minimal assistance/contact guard assist within 7 day(s). 3.  Patient will perform toilet transfer with minimal assistance/contact guard assist within 7 day(s). 4.  Patient will perform all aspects of toileting with minimal assistance/contact guard assist within 7 day(s). 5.  Patient will demonstrate good safety awareness during ADL tasks and mobility within 7 day(s). Outcome: Not Met    OCCUPATIONAL THERAPY EVALUATION  Patient: Vera Yanez (45 y.o. female)  Date: 6/25/2022  Primary Diagnosis: Femur fracture (HCC) [S72.90XA]  Procedure(s) (LRB):  HIP PERCUTANEOUS PINNING CANNULATED SCREWS (Left) 1 Day Post-Op   Precautions:   Fall,WBAT    ASSESSMENT  Based on the objective data described below, the patient presents with decreased activity tolerance, decreased sitting and standing balance, post op pain in left LE and hx of right foot drop, vision changes since left eye sx 3/30/22, decreased safety awareness, decreased memory/benefits from repetition and slow, short instruction. Supportive son present throughout.  Patient very motivated and eager to increase independence and safety. Reports not really having therapy for her right foot drop, which she had sx on in 2010 and contributing to her decreased balance and increased fall risk along with her decreased vision. Feel she would benefit from inpatient rehab. Current Level of Function Impacting Discharge (ADLs/self-care): max assist LE ADL's, mod assist toileting, min to mod assist stand pivot transfers    Functional Outcome Measure: The patient scored 40/100 on the Barthel Index outcome measure which is indicative of partial dependence. Other factors to consider for discharge: home alone during day, hx of decreased vision and right foot drop on top of new left hip fx     Patient will benefit from skilled therapy intervention to address the above noted impairments. PLAN :  Recommendations and Planned Interventions: self care training, functional mobility training, therapeutic exercise, balance training, therapeutic activities, cognitive retraining, endurance activities, patient education, home safety training, and family training/education    Frequency/Duration: Patient will be followed by occupational therapy 5 times a week to address goals. Recommendation for discharge: (in order for the patient to meet his/her long term goals)  Therapy 3 hours per day 5-7 days per week    This discharge recommendation:  Has been made in collaboration with the attending provider and/or case management    IF patient discharges home will need the following DME:        SUBJECTIVE:   Patient stated I think it might help with this leg too.  re: rehab and right LE    OBJECTIVE DATA SUMMARY:   HISTORY:   Past Medical History:   Diagnosis Date    Arthritis     right knee - cortison injection    CAD (coronary artery disease)     heart attack/stents    [address] [address]     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    Hypertension     Ill-defined condition     increased cholesterol    Ill-defined condition     osteoporosis    Ill-defined condition     dysphagia    Ill-defined condition     hiatal hernia    Thyroid disease      Past Surgical History:   Procedure Laterality Date    COLONOSCOPY N/A 8/30/2016    COLONOSCOPY performed by Jay Beauchamp MD at [facility] ENDOSCOPY     [address]      x2    HX CHOLECYSTECTOMY      HX COLONOSCOPY      2009, 2016    HX DILATION AND CURETTAGE      HX GI      colonoscopy    HX HEART CATHETERIZATION      stents/has a significant blockage that is being watched    HX HEENT      thyroidectomy    HX HYSTERECTOMY      HX ORTHOPAEDIC      anterior muscle removed d/t 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 - has foot drop now    HX TONSILLECTOMY      CO CHEST SURGERY PROCEDURE UNLISTED      part of right lung removed for nodule - ?path       Expanded or extensive additional review of patient history:     Home Situation  Home Environment: Private residence  # Steps to Enter: 5  Rails to Enter: Yes  Hand Rails : Right  One/Two Story Residence: Two story, live on 1st floor  # of Interior Steps: 14  Living Alone: No  Support Systems: Child(mayi)  Patient Expects to be Discharged to[de-identified] Rehab hospital/unit acute  Current DME Used/Available at Home: Walker, rolling  Tub or Shower Type: Tub/Shower combination    Hand dominance:     EXAMINATION OF PERFORMANCE DEFICITS:  Cognitive/Behavioral Status:  Neurologic State: Alert  Orientation Level: Oriented X4  Cognition: Follows commands; Appropriate for age attention/concentration;Memory loss  Perception: Appears intact  Perseveration: No perseveration noted  Safety/Judgement: Awareness of environment;Decreased awareness of need for assistance;Decreased awareness of need for safety;Decreased insight into deficits; Fall prevention;Home safety    Skin: dressing intact    Edema: left LE    Hearing:   Auditory  Auditory Impairment: None    Vision/Perceptual:                      Diplopia: Yes (intermittent due to strabismus)    Acuity:  (strabismus sx on left eye 3/30/22) Corrective Lenses: Glasses    Range of Motion:  AROM: Generally decreased, functional  PROM: Grossly decreased, non-functional                      Strength:  Strength: Generally decreased, functional                Coordination:  Coordination: Within functional limits  Fine Motor Skills-Upper: Left Intact; Right Intact    Gross Motor Skills-Upper: Left Intact; Right Intact    Tone & Sensation:  Tone: Normal  Sensation: Intact         Balance:  Sitting: Impaired  Sitting - Static: Fair (occasional)  Sitting - Dynamic: Poor (constant support)  Standing: Impaired  Standing - Static: Constant support;Good  Standing - Dynamic : Poor    Functional Mobility and Transfers for ADLs:  Bed Mobility:  Rolling: Moderate assistance  Supine to Sit: Moderate assistance; Additional time;Assist x1  Sit to Supine: Moderate assistance  Scooting: Moderate assistance    Transfers:  Sit to Stand: Minimum assistance; Moderate assistance;Assist x1;Additional time  Stand to Sit: Minimum assistance; Additional time;Assist x1  Bed to Chair: Minimum assistance; Adaptive equipment;Assist x1;Additional time  Bathroom Mobility: Dependent/total assistance  Toilet Transfer : Minimum assistance; Adaptive equipment; Additional time;Assist x1; Moderate assistance    ADL Assessment:  Feeding: Independent    Oral Facial Hygiene/Grooming: Setup (seated)    Bathing: Moderate assistance    Type of Bath: Patient refused    Upper Body Dressing: Contact guard assistance    Lower Body Dressing: Maximum assistance    Toileting: Moderate assistance                  ADL Intervention and task modifications:     Educated on role of OT    Patient instructed and indicated understanding the benefits of maintaining activity tolerance, functional mobility, and independence with self care tasks during acute stay  to ensure safe return home and to baseline.  Encouraged patient to increase frequency and duration OOB, be out of bed for all meals, perform daily ADLs (as approved by RN/MD regarding bathing etc), and performing functional mobility to/from bathroom. Instructed in use of hip kit for LE dressing, used reacher and long shoe horn    Educated on use of bedside commode for toileting and adjusted to her height, instructed on need for assist with all transfers at this time, verbalized understanding    Instructed on body mechanics and positioning of left LE for sit to stand and stand to sit and verbalized increased comfort following instruction    Educated on home safety related to use of cell phone and techniques to increase safety in event of a fall    Instructed on right dorsiflexion stretch with towel/sheet due to hx of foot drop    Instructed on bed mobility and optimal joint alignment/use of UE's       Cognitive Retraining  Safety/Judgement: Awareness of environment;Decreased awareness of need for assistance;Decreased awareness of need for safety;Decreased insight into deficits; Fall prevention;Home safety    Functional Measure:    Barthel Index:  Bathin  Bladder: 5  Bowels: 10  Groomin  Dressin  Feeding: 10  Mobility: 0  Stairs: 0  Toilet Use: 5  Transfer (Bed to Chair and Back): 10  Total: 40/100      The Barthel ADL Index: Guidelines  1. The index should be used as a record of what a patient does, not as a record of what a patient could do. 2. The main aim is to establish degree of independence from any help, physical or verbal, however minor and for whatever reason. 3. The need for supervision renders the patient not independent. 4. A patient's performance should be established using the best available evidence. Asking the patient, friends/relatives and nurses are the usual sources, but direct observation and common sense are also important. However direct testing is not needed. 5. Usually the patient's performance over the preceding 24-48 hours is important, but occasionally longer periods will be relevant.   6. Middle categories imply that the patient supplies over 50 per cent of the effort. 7. Use of aids to be independent is allowed. Score Interpretation (from [address] [facility])    Independent   60-79 Minimally independent   40-59 Partially dependent   20-39 Very dependent   <20 Totally dependent     -Prerna Keys., Barthel, D.W. (1965). Functional evaluation: the Barthel Index. [address] ([address] (1997). The Barthel activities of daily living index: self-reporting versus actual performance in the old (> or = 75 years). Journal of [address] [place] [address] 45(7), [facility], J.ALESHAF, Marko Davison., Lolita Ray (1999). Measuring the change in disability after inpatient rehabilitation; comparison of the responsiveness of the Barthel Index and Functional Jackson Measure. Journal of Neurology, Neurosurgery, and Psychiatry, 66(4), 414-411. Elisabeth Edgar, N.J.A, AKBAR Delgado, & Everett Munroe, M.A. (2004) Assessment of post-stroke quality of life in cost-effectiveness studies: The usefulness of the Barthel Index and the EuroQoL-5D. Quality of Life Research, 15, 756-62         Occupational Therapy Evaluation Charge Determination   History Examination Decision-Making   MEDIUM Complexity : Expanded review of history including physical, cognitive and psychosocial  history  MEDIUM Complexity : 3-5 performance deficits relating to physical, cognitive , or psychosocial skils that result in activity limitations and / or participation restrictions MEDIUM Complexity : Patient may present with comorbidities that affect occupational performnce.  Miniml to moderate modification of tasks or assistance (eg, physical or verbal ) with assesment(s) is necessary to enable patient to complete evaluation       Based on the above components, the patient evaluation is determined to be of the following complexity level: MEDIUM  Pain Rating:  Not rated left LE, meds given    Activity Tolerance:   Good    After treatment patient left in no apparent distress:    Sitting in chair, Call bell within reach, Bed / chair alarm activated, and Caregiver / family present    COMMUNICATION/EDUCATION:   The patients plan of care was discussed with: Registered nurse and Case management. Home safety education was provided and the patient/caregiver indicated understanding. and Patient/family have participated as able in goal setting and plan of care. This patients plan of care is appropriate for delegation to [facility].     Thank you for this referral.  Yvette Byrne OTR/L  Time Calculation: 68 mins

## 2022-06-26 LAB — HGB BLD-MCNC: 11.5 G/DL (ref 11.5–16)

## 2022-06-26 PROCEDURE — 36415 COLL VENOUS BLD VENIPUNCTURE: CPT

## 2022-06-26 PROCEDURE — 74011250637 HC RX REV CODE- 250/637: Performed by: NURSE PRACTITIONER

## 2022-06-26 PROCEDURE — 85018 HEMOGLOBIN: CPT

## 2022-06-26 PROCEDURE — 74011250637 HC RX REV CODE- 250/637: Performed by: INTERNAL MEDICINE

## 2022-06-26 PROCEDURE — 94761 N-INVAS EAR/PLS OXIMETRY MLT: CPT

## 2022-06-26 PROCEDURE — 74011000250 HC RX REV CODE- 250: Performed by: INTERNAL MEDICINE

## 2022-06-26 PROCEDURE — 97116 GAIT TRAINING THERAPY: CPT

## 2022-06-26 PROCEDURE — 74011000250 HC RX REV CODE- 250: Performed by: STUDENT IN AN ORGANIZED HEALTH CARE EDUCATION/TRAINING PROGRAM

## 2022-06-26 PROCEDURE — 74011250636 HC RX REV CODE- 250/636: Performed by: STUDENT IN AN ORGANIZED HEALTH CARE EDUCATION/TRAINING PROGRAM

## 2022-06-26 PROCEDURE — 97530 THERAPEUTIC ACTIVITIES: CPT

## 2022-06-26 PROCEDURE — U0005 INFEC AGEN DETEC AMPLI PROBE: HCPCS

## 2022-06-26 PROCEDURE — 65270000029 HC RM PRIVATE

## 2022-06-26 PROCEDURE — 74011250637 HC RX REV CODE- 250/637: Performed by: STUDENT IN AN ORGANIZED HEALTH CARE EDUCATION/TRAINING PROGRAM

## 2022-06-26 RX ADMIN — DOCUSATE SODIUM 50MG AND SENNOSIDES 8.6MG 1 TABLET: 8.6; 5 TABLET, FILM COATED ORAL at 17:20

## 2022-06-26 RX ADMIN — ACETAMINOPHEN 650 MG: 325 TABLET ORAL at 05:41

## 2022-06-26 RX ADMIN — EZETIMIBE 10 MG: 10 TABLET ORAL at 08:29

## 2022-06-26 RX ADMIN — Medication 10 ML: at 22:30

## 2022-06-26 RX ADMIN — Medication 10 ML: at 14:00

## 2022-06-26 RX ADMIN — Medication 1 TABLET: at 08:29

## 2022-06-26 RX ADMIN — LEVOTHYROXINE SODIUM 50 MCG: 0.05 TABLET ORAL at 05:41

## 2022-06-26 RX ADMIN — ASPIRIN 81 MG: 81 TABLET, CHEWABLE ORAL at 08:30

## 2022-06-26 RX ADMIN — Medication 10 ML: at 05:42

## 2022-06-26 RX ADMIN — POLYETHYLENE GLYCOL 3350 17 G: 17 POWDER, FOR SOLUTION ORAL at 08:29

## 2022-06-26 RX ADMIN — OXYCODONE 5 MG: 5 TABLET ORAL at 17:19

## 2022-06-26 RX ADMIN — DOCUSATE SODIUM 50MG AND SENNOSIDES 8.6MG 1 TABLET: 8.6; 5 TABLET, FILM COATED ORAL at 08:29

## 2022-06-26 RX ADMIN — ENOXAPARIN SODIUM 40 MG: 100 INJECTION SUBCUTANEOUS at 08:30

## 2022-06-26 RX ADMIN — ACETAMINOPHEN 650 MG: 325 TABLET ORAL at 18:30

## 2022-06-26 RX ADMIN — ACETAMINOPHEN 650 MG: 325 TABLET ORAL at 12:17

## 2022-06-26 RX ADMIN — ROSUVASTATIN CALCIUM 10 MG: 10 TABLET, FILM COATED ORAL at 22:30

## 2022-06-26 RX ADMIN — SODIUM CHLORIDE, PRESERVATIVE FREE 10 ML: 5 INJECTION INTRAVENOUS at 22:30

## 2022-06-26 RX ADMIN — SODIUM CHLORIDE, PRESERVATIVE FREE 10 ML: 5 INJECTION INTRAVENOUS at 05:42

## 2022-06-26 RX ADMIN — OXYCODONE 5 MG: 5 TABLET ORAL at 08:30

## 2022-06-26 RX ADMIN — SODIUM CHLORIDE, PRESERVATIVE FREE 10 ML: 5 INJECTION INTRAVENOUS at 14:00

## 2022-06-26 NOTE — PROGRESS NOTES
Problem: Mobility Impaired (Adult and Pediatric)  Goal: *Acute Goals and Plan of Care (Insert Text)  Description: FUNCTIONAL STATUS PRIOR TO ADMISSION: Patient was independent and active without use of DME.    HOME SUPPORT PRIOR TO ADMISSION: The patient lived with son but did not require assist.    Physical Therapy Goals  Initiated 6/25/2022  1. Patient will move from supine to sit and sit to supine  in bed with minimal assistance/contact guard assist within 7 day(s). 2.  Patient will transfer from bed to chair and chair to bed with minimal assistance/contact guard assist using the least restrictive device within 7 day(s). 3.  Patient will perform sit to stand with supervision/set-up within 7 day(s). 4.  Patient will ambulate with supervision/set-up for 75 feet with the least restrictive device within 7 day(s). 5.  Patient will ascend/descend 5 stairs with unilateral handrail(s) with minimal assistance/contact guard assist within 7 day(s).   6/26/2022 1140 by Gaston Ornelas  Outcome: Progressing Towards Goal  Note:   PHYSICAL THERAPY TREATMENT  Patient: Martínez Taylor (57 y.o. female)  Date: 6/26/2022  Diagnosis: Femur fracture ([facility]) [S72.90XA] Femur fracture ([address]  Procedure(s) (LRB):  HIP PERCUTANEOUS PINNING CANNULATED SCREWS (Left) 2 Days Post-Op  Precautions: Fall,WBAT  Chart, physical therapy assessment, plan of care and goals were reviewed. ASSESSMENT  Patient continues with skilled PT services and progressing towards goals. Pt tolerated sitting in chair one hour. Sapna with increased time for mobility task. Discussed at length use of AFO for RLE, pt somewhat resistant but will ask family to bring from home. Report nausea and lightheadedness throughout out session, BP stable.  Fearful of falling during mobility  Independent at baseline and recommend IPR prior to returning home    Current Level of Function Impacting Discharge (mobility/balance): Min A     Other factors to consider for discharge: vision deficits R foot drop         PLAN :  Patient continues to benefit from skilled intervention to address the above impairments. Continue treatment per established plan of care. to address goals. Recommendation for discharge: (in order for the patient to meet his/her long term goals)  Therapy 3 hours per day 5-7 days per week    This discharge recommendation:  Has been made in collaboration with the attending provider and/or case management    IF patient discharges home will need the following DME: to be determined (TBD)       SUBJECTIVE:   Patient stated i don't like that brace.     OBJECTIVE DATA SUMMARY:   Critical Behavior:  Neurologic State: Alert  Orientation Level: Oriented X4  Cognition: Appropriate for age attention/concentration  Safety/Judgement: Awareness of environment,Decreased awareness of need for assistance,Decreased awareness of need for safety,Decreased insight into deficits,Fall prevention,Home safety  Functional Mobility Training:  Bed Mobility:     Supine to Sit: Moderate assistance;Assist x1;Additional time  Sit to Supine: Moderate assistance; Additional time  Scooting: Minimum assistance;Contact guard assistance        Transfers:  Sit to Stand: Minimum assistance; Additional time;Assist x1  Stand to Sit: Minimum assistance; Additional time;Assist x1        Bed to Chair: Minimum assistance;Assist x1;Additional time                    Balance:  Sitting: Intact; With support  Sitting - Static: Fair (occasional)  Sitting - Dynamic: Fair (occasional)  Standing: Impaired  Standing - Static: Constant support; Fair  Standing - Dynamic : Constant support; Fair  Ambulation/Gait Training:  Distance (ft): 10 Feet (ft)  Assistive Device: Walker, rolling;Gait belt  Ambulation - Level of Assistance: Minimal assistance; Additional time;Assist x1                                               Stairs:               Therapeutic Exercises:     Pain Rating:  3/10    Activity Tolerance:   Good    After treatment patient left in no apparent distress:   Supine in bed, Call bell within reach, and Bed / chair alarm activated    COMMUNICATION/COLLABORATION:   The patients plan of care was discussed with: Registered nurse. Antwan Bedolla   Time Calculation: 38 mins          6/26/2022 [id number] by Xochitl Frazier  Outcome: Progressing Towards Goal  Note:   PHYSICAL THERAPY TREATMENT  Patient: Cristal Lugo (86 y.o. female)  Date: 6/26/2022  Diagnosis: Femur fracture ([facility]) [S72.90XA] Femur fracture ([address]  Procedure(s) (LRB):  HIP PERCUTANEOUS PINNING CANNULATED SCREWS (Left) 2 Days Post-Op  Precautions: Fall,WBAT  Chart, physical therapy assessment, plan of care and goals were reviewed. ASSESSMENT  Patient continues with skilled PT services and progressing towards goals. Increased time and verbal cues for sequencing for bed mobility and transfers Min A x 1 with increased time for short distances using RW for steadying. Demonstrates steppage gait on RLE to compensate for foot drop, pt reports she does not use AFO at baseline. Increased nausea with upright activity, assist to chair with symptoms resolving. Pulse Resp BP SpO2   06/26/22 0933 sitting 66 -- 133/63 --   06/26/22 0930 Sitting post activity 66 -- (!) 132/59 --   06/26/22 0905 preactivity 77 -- (!) 174/78 --             Current Level of Function Impacting Discharge (mobility/balance): Sapna x 1    Other factors to consider for discharge:          PLAN :  Patient continues to benefit from skilled intervention to address the above impairments. Continue treatment per established plan of care. to address goals.     Recommendation for discharge: (in order for the patient to meet his/her long term goals)  Therapy 3 hours per day 5-7 days per week vs HHPT pending progress    This discharge recommendation:  Has been made in collaboration with the attending provider and/or case management    IF patient discharges home will need the following DME: to be determined (TBD)       SUBJECTIVE:   Patient stated i am so afraid of falling again.     OBJECTIVE DATA SUMMARY:   Critical Behavior:  Neurologic State: Alert  Orientation Level: Oriented X4  Cognition: Appropriate for age attention/concentration  Safety/Judgement: Awareness of environment,Decreased awareness of need for assistance,Decreased awareness of need for safety,Decreased insight into deficits,Fall prevention,Home safety  Functional Mobility Training:  Bed Mobility:     Supine to Sit: Moderate assistance;Assist x1;Additional time     Scooting: Minimum assistance;Contact guard assistance        Transfers:  Sit to Stand: Minimum assistance; Additional time;Assist x1  Stand to Sit: Minimum assistance; Additional time;Assist x1        Bed to Chair: Minimum assistance;Assist x1;Additional time                    Balance:  Sitting: Impaired  Sitting - Static: Fair (occasional)  Sitting - Dynamic: Fair (occasional)  Standing: Impaired  Standing - Static: Constant support; Fair  Standing - Dynamic : Constant support; Fair  Ambulation/Gait Training:  Distance (ft): 5 Feet (ft)  Assistive Device: Walker, rolling;Gait belt  Ambulation - Level of Assistance: Minimal assistance;Assist x1;Additional time                                               Stairs: Therapeutic Exercises: Ankle pumps on LLE, seated knee extension BLE  glute sets  Pain Rating:  3*/10    Activity Tolerance:   Good    After treatment patient left in no apparent distress:   Sitting in chair, Call bell within reach, and Bed / chair alarm activated    COMMUNICATION/COLLABORATION:   The patients plan of care was discussed with: Registered nurseAziza Banks   Time Calculation: 29 mins

## 2022-06-26 NOTE — PROGRESS NOTES
Bedside and Verbal shift change report given to Jonny (oncoming nurse) by Gustabo Trimble (offgoing nurse). Report included the following information SBAR, Kardex and Recent Results.

## 2022-06-26 NOTE — CONSULTS
Nutrition Note    RD consulted for ONS. Chart reviewed. Recommend Ensure Enlive once daily (350 kcal, 44 g carbs, 20 g P) to aid in meeting kcal/protein needs. Will follow up with patient Monday to determine whether ONS needs to be modified.      Electronically signed by Gely Mina RD on 1/40/0132     Contact: Ext: [phone], or via [email or web address]

## 2022-06-26 NOTE — PROGRESS NOTES
Problem: Mobility Impaired (Adult and Pediatric)  Goal: *Acute Goals and Plan of Care (Insert Text)  Description: FUNCTIONAL STATUS PRIOR TO ADMISSION: Patient was independent and active without use of DME.    HOME SUPPORT PRIOR TO ADMISSION: The patient lived with son but did not require assist.    Physical Therapy Goals  Initiated 6/25/2022  1. Patient will move from supine to sit and sit to supine  in bed with minimal assistance/contact guard assist within 7 day(s). 2.  Patient will transfer from bed to chair and chair to bed with minimal assistance/contact guard assist using the least restrictive device within 7 day(s). 3.  Patient will perform sit to stand with supervision/set-up within 7 day(s). 4.  Patient will ambulate with supervision/set-up for 75 feet with the least restrictive device within 7 day(s). 5.  Patient will ascend/descend 5 stairs with unilateral handrail(s) with minimal assistance/contact guard assist within 7 day(s). Outcome: Progressing Towards Goal  Note:   PHYSICAL THERAPY TREATMENT  Patient: Gypsy Stanton (8 y.o. female)  Date: 6/26/2022  Diagnosis: Femur fracture ([facility]) [S72.90XA] Femur fracture ([address]  Procedure(s) (LRB):  HIP PERCUTANEOUS PINNING CANNULATED SCREWS (Left) 2 Days Post-Op  Precautions: Fall,WBAT  Chart, physical therapy assessment, plan of care and goals were reviewed. ASSESSMENT  Patient continues with skilled PT services and progressing towards goals. Increased time and verbal cues for sequencing for bed mobility and transfers Min A x 1 with increased time for short distances using RW for steadying. Demonstrates steppage gait on RLE to compensate for foot drop, pt reports she does not use AFO at baseline. Increased nausea with upright activity, assist to chair with symptoms resolving.         Pulse Resp BP SpO2   06/26/22 0933 sitting 66 -- 133/63 --   06/26/22 0930 Sitting post activity 66 -- (!) 132/59 --   06/26/22 0905 preactivity 77 -- (!) 174/78 -- Current Level of Function Impacting Discharge (mobility/balance): Sapna x 1    Other factors to consider for discharge:          PLAN :  Patient continues to benefit from skilled intervention to address the above impairments. Continue treatment per established plan of care. to address goals. Recommendation for discharge: (in order for the patient to meet his/her long term goals)  Therapy 3 hours per day 5-7 days per week vs HHPT pending progress    This discharge recommendation:  Has been made in collaboration with the attending provider and/or case management    IF patient discharges home will need the following DME: to be determined (TBD)       SUBJECTIVE:   Patient stated i am so afraid of falling again.     OBJECTIVE DATA SUMMARY:   Critical Behavior:  Neurologic State: Alert  Orientation Level: Oriented X4  Cognition: Appropriate for age attention/concentration  Safety/Judgement: Awareness of environment,Decreased awareness of need for assistance,Decreased awareness of need for safety,Decreased insight into deficits,Fall prevention,Home safety  Functional Mobility Training:  Bed Mobility:     Supine to Sit: Moderate assistance;Assist x1;Additional time     Scooting: Minimum assistance;Contact guard assistance        Transfers:  Sit to Stand: Minimum assistance; Additional time;Assist x1  Stand to Sit: Minimum assistance; Additional time;Assist x1        Bed to Chair: Minimum assistance;Assist x1;Additional time                    Balance:  Sitting: Impaired  Sitting - Static: Fair (occasional)  Sitting - Dynamic: Fair (occasional)  Standing: Impaired  Standing - Static: Constant support; Fair  Standing - Dynamic : Constant support; Fair  Ambulation/Gait Training:  Distance (ft): 5 Feet (ft)  Assistive Device: Walker, rolling;Gait belt  Ambulation - Level of Assistance: Minimal assistance;Assist x1;Additional time                                               Stairs:               Therapeutic Exercises: Ankle pumps on LLE, seated knee extension BLE  glute sets  Pain Rating:  3*/10    Activity Tolerance:   Good    After treatment patient left in no apparent distress:   Sitting in chair, Call bell within reach, and Bed / chair alarm activated    COMMUNICATION/COLLABORATION:   The patients plan of care was discussed with: Registered nurse.      Aram Dong   Time Calculation: 29 mins

## 2022-06-26 NOTE — PROGRESS NOTES
Progress Note          Pt Name  Anoop Frank   Date of Birth 1950   Medical Record Number  [id number]      Age  66 y.o. PCP Elisabet Galvan MD   Admit date:  6/24/2022    Room Number  414/01  @ [facility]   Date of Service  6/26/2022     Admission Diagnoses:  Femur fracture ([address]     History of present illness (copied from H&P)  \" Ms. Karl Gauthier is a 66 y.o.  female with PMH of CAD, HTN, osteoporosis admitted s/p fall for L femoral neck fracture. Pt reports the fall was mechanical. Denies preceeding CP, SOB, dizziness, palpitations. Prior to this event was independent and denied exertional CP, SOB. In fact had recently spent 2 hours mowing her lawn with push mower without symptoms. 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 Hypothyroidism  S/p total thyroidectomy . 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  -             CODE STATUS   Full     Functional Status  Pt lives with her family     Surrogate decision maker:  Pt's son      Prophylaxis   Lovenox   Discharge Plan:  SNF/LTC,     Misc   Benefit:  [place]or: [place] MED[place] / Plan: [place] MEDICARE PART A & B / Product Type: Medicare /    Isolation :  There are currently no Active Isolations   ADT status:  INPATIENT      Query   None noted today    Prognosis      Social issues  Date  Comment     06/25/22  16:44 PM No family or visitor here with the patient today      6/26/22  6:00 PM No family or visitor here with the patient today                 Subjective Data     \"I am waiting for a miracle  \"  Review of Systems - History obtained from child and the patient  Respiratory ROS: no cough, shortness of breath, or wheezing  Cardiovascular ROS: no chest pain or dyspnea on exertion  Gastrointestinal ROS: no abdominal pain, change in bowel habits, or black or bloody stools  Musculoskeletal ROS: positive for - muscular weakness    Objective Data       Comments  Pleasant lady lying in bed in no distress   Her son is in the room      Patient Vitals for the past 24 hrs:   BP   06/26/22 0933 133/63   06/26/22 0930 (!) 132/59   06/26/22 0905 (!) 174/78   06/26/22 0739 (!) 146/65   06/26/22 0303 130/75   06/25/22 2241 137/68   06/25/22 1957 (!) 143/67      Patient Vitals for the past 24 hrs:   Pulse   06/26/22 0933 66   06/26/22 0930 66   06/26/22 0905 77   06/26/22 0739 74   06/26/22 0303 60   06/25/22 2241 67   06/25/22 1957 75      Patient Vitals for the past 24 hrs:   Resp   06/26/22 0739 16   06/26/22 0303 16   06/25/22 2241 16   06/25/22 1957 20      Patient Vitals for the past 24 hrs:   Temp   06/26/22 0739 99.3 °F (37.4 °C)   06/26/22 0303 97.6 °F (36.4 °C)   06/25/22 2241 99 °F (37.2 °C)   06/25/22 2155 99 °F (37.2 °C)   06/25/22 1957 99 °F (37.2 °C)        SpO2 Readings from Last 6 Encounters:   06/26/22 93%   11/30/21 94%   08/29/19 99%   08/30/16 100%       O2 Flow Rate (L/min): 2 l/min  O2 Device: None (Room air) Body mass index is 24.84 kg/m².  -  Wt Readings from Last 10 Encounters:   06/26/22 61.6 kg (135 lb 12.9 oz)   11/30/21 62.8 kg (138 lb 7.2 oz)   08/29/19 63 kg (139 lb)   08/30/16 60.8 kg (134 lb)      No intake or output data in the 24 hours ending 06/26/22 1800      Physical Exam: General:  Alert, cooperative,   well noursished,   well developed,   appears stated age    Ears/Eyes:  Hearing intact  Sclera anicteric. Pupils equal   Mouth/Throat:  Mucous membranes I moist    Neck:     Lungs:  Chest excursion symmetrical   Auscultation B/L Symmetrical with   Vesicular breath sounds     No Crepitations noted         CVS:  Regular rate and rhythm   no  murmur,   No click, rub or gallop  S1 normal   S2 normal   Pedal pulses  b/l symmetrical   Abdomen:    Soft, non-tender  Bowel sounds normal  No distension      Extremities:  LLE dressed   Right foot drop is noted.    Previous surgical scar on the Right patella area noted   No cyanosis, jaundice  No edema noted   No sign of DVT/cord like lesion on palpation  No sign of acute trauma    Skin:    Skin color, texture, turgor normal. no acute rash or lesions    Lymph nodes:     Musculoskeletal Muscle bulk B/L symmetrical   Neuro Cranial nerves are intact,   motor movement b/l symmetrical,   Sensory evaluation b/l symmetrical    Psych:  Alert and oriented,   normal mood & affect          Medications reviewed     Current Facility-Administered Medications   Medication Dose Route Frequency    0.9% sodium chloride infusion  75 mL/hr IntraVENous CONTINUOUS    sodium chloride (NS) flush 5-40 mL  5-40 mL IntraVENous Q8H    sodium chloride (NS) flush 5-40 mL  5-40 mL IntraVENous PRN    acetaminophen (TYLENOL) tablet 650 mg  650 mg Oral Q6H    oxyCODONE IR (ROXICODONE) tablet 2.5 mg  2.5 mg Oral Q4H PRN    oxyCODONE IR (ROXICODONE) tablet 5 mg  5 mg Oral Q4H PRN    fentaNYL citrate (PF) injection 25 mcg  25 mcg IntraVENous Q4H PRN    sodium chloride (NS) flush 5-40 mL  5-40 mL IntraVENous Q8H    sodium chloride (NS) flush 5-40 mL  5-40 mL IntraVENous PRN    azelastine (ASTELIN) 137mcg/spray nasal spray  1 Spray Both Nostrils BID PRN    ezetimibe (ZETIA) tablet 10 mg  10 mg Oral DAILY    levothyroxine (SYNTHROID) tablet 50 mcg  50 mcg Oral ACB    rosuvastatin (CRESTOR) tablet 10 mg  10 mg Oral QHS    vitamin C-B-Z-lutein-minerals (OCUVITE) tablet 1 Tablet  1 Tablet Oral DAILY    hydrALAZINE (APRESOLINE) 20 mg/mL injection 10 mg  10 mg IntraVENous Q6H PRN    aspirin chewable tablet 81 mg  81 mg Oral DAILY    [Held by provider] metoprolol tartrate (LOPRESSOR) tablet 25 mg  25 mg Oral Q12H    sodium chloride (NS) flush 5-40 mL  5-40 mL IntraVENous Q8H    sodium chloride (NS) flush 5-40 mL  5-40 mL IntraVENous PRN    naloxone (NARCAN) injection 0.4 mg  0.4 mg IntraVENous PRN    calcium-vitamin D (OS-ANTWAN +D3) 500 mg-200 unit per tablet 1 Tablet  1 Tablet Oral TID WITH MEALS    senna-docusate (PERICOLACE) 8.6-50 mg per tablet 1 Tablet  1 Tablet Oral BID    polyethylene glycol (MIRALAX) packet 17 g  17 g Oral DAILY    bisacodyL (DULCOLAX) suppository 10 mg  10 mg Rectal DAILY PRN    enoxaparin (LOVENOX) injection 40 mg  40 mg SubCUTAneous DAILY       Relevant other informations: Other medical conditions listed in [facility] problem list section; all of these and other pertinent data were taken into consideration when treatment plan is developed and customized to this patient's unique overall circumstances and needs. We have reviewed available old medical records within the constraints of this admission process. Data Review:   Recent Days:  All Micro Results     Procedure Component Value Units Date/Time    URINE CULTURE HOLD SAMPLE [[id number]] Collected: 06/24/22 1840    Order Status: Completed Specimen: Serum Updated: 06/24/22 1846     Urine culture hold       Urine on hold in Microbiology dept for 2 days. If unpreserved urine is submitted, it cannot be used for addtional testing after 24 hours, recollection will be required.                 Recent Labs     06/26/22 0415 06/25/22 0417 06/24/22  1029   WBC  --  13.1* 12.2*   HGB 11.5 12.2 13.9   HCT  --  37.8 42.7   PLT  --  184 230     Recent Labs     06/25/22 0417 06/24/22  1130    141   K 3.9 4.5    107   CO2 25 26   * 97   BUN 11 17   CREA 0.81 0.81   CA 8.3* 9.1   MG 1.8  --    ALB  --  3.5   TBILI  --  0.4   ALT  --  34   INR  --  1.0      Lab Results   Component Value Date/Time    TSH 1.80 11/29/2021 02:00 AM            Care Plan discussed with:Patient/Family and Nurse   Other medical conditions are listed in the active hospital problem list section; these and other pertinent data were taken into consideration when the treatment plan was developed and customized to this patient's unique overall circumstances and needs. High complexity decision making was performed for this patient who is at high risk for decompensation with multiple organ involvement. Today total floor/unit time was 40  minutes while caring for this patient and greater than 50% of that time was spent with patient (and/or family) coordinating patients clinical issues; this includes time spent during multidisciplinary rounds.         Dov Reinoso MD MPH FACP [facility]  6/26/2022

## 2022-06-26 NOTE — PROGRESS NOTES
Subjective:     No acute events over night. Patient doing well and says pain improving. Was able to stand with PT and get to chair yesterday. Denies paresthesias, cp, sob, n/v, calf pain. Objective  Objective:     Patient Vitals for the past 12 hrs:   BP Temp Pulse Resp SpO2   06/26/22 0739 (!) 146/65 99.3 °F (37.4 °C) 74 16 93 %   06/26/22 0303 130/75 97.6 °F (36.4 °C) 60 16 90 %   06/25/22 2241 137/68 99 °F (37.2 °C) 67 16 90 %   06/25/22 2155 -- 99 °F (37.2 °C) -- -- --       Alert and oriented x3. LLE[de-identified]  Dressing is clean, dry, and intact   Motor Exam is intact   Sensation is intact to light touch. No calf swelling/discomfort   WWP distally    Gait  Ambulation - Level of Assistance: Minimal assistance,Assist x2,Additional time  Distance (ft): 5 Feet (ft)  Assistive Device: Walker, rolling,Gait belt         LABS :  Recent Labs     06/26/22  0415 06/25/22  0417 06/25/22  0417   WBC  --   --  13.1*   HGB 11.5   < > 12.2   HCT  --   --  37.8   K  --   --  3.9   CREA  --   --  0.81   GLU  --   --  139*    < > = values in this interval not displayed. Assessment:   71yoF s/p left hip CRPP for femoral neck fx POD2    Plan:   Weighbearing: as tolerated w/walker and PT  DVT ppx: SCDs, lovenox (may transition to ASA/plavix at discretion of medicine team)  Antibiotics: complete   Pain control: Continue pain management/ ice to operative area  Continue Progressing with PT/OT  Dispo: per medicine/PT eval     Please have patient follow up with me 2 wks after discharge - phone below for appt      Eddi Baker M.D.   [facility]  Direct Office Line: [phone]  Medical Staff: Pk Aden

## 2022-06-26 NOTE — PROGRESS NOTES
3:39 PM  PCR still pending, cm to follow tomorrow. 8:35 AM  Consult received for IRF referral.  This was sent yesterday and call placed this am to liaisons to inquire as to acceptance and bed availability. A PCR test will be needed and physician notified in [facility], as if it is ordered today it will be resulted more timely and is good for a week. Transition of Care  RUR 7% LOW  1- CM following regarding IRF referral that has been sent to [facility] per daughter's request.  2- PCR pending   3- [facility] approved, pending PCR, anticipate bed available Tuesday  4- [address] rehab will review medicals tomorrow  5- Anticipate stretcher will be needed at discharge.

## 2022-06-27 LAB
SARS-COV-2, XPLCVT: NOT DETECTED
SOURCE, COVRS: NORMAL

## 2022-06-27 PROCEDURE — 74011250636 HC RX REV CODE- 250/636: Performed by: STUDENT IN AN ORGANIZED HEALTH CARE EDUCATION/TRAINING PROGRAM

## 2022-06-27 PROCEDURE — 94761 N-INVAS EAR/PLS OXIMETRY MLT: CPT

## 2022-06-27 PROCEDURE — 74011250636 HC RX REV CODE- 250/636: Performed by: NURSE PRACTITIONER

## 2022-06-27 PROCEDURE — 97535 SELF CARE MNGMENT TRAINING: CPT

## 2022-06-27 PROCEDURE — 97116 GAIT TRAINING THERAPY: CPT

## 2022-06-27 PROCEDURE — 74011000250 HC RX REV CODE- 250: Performed by: INTERNAL MEDICINE

## 2022-06-27 PROCEDURE — 97530 THERAPEUTIC ACTIVITIES: CPT

## 2022-06-27 PROCEDURE — 65270000029 HC RM PRIVATE

## 2022-06-27 PROCEDURE — 74011000250 HC RX REV CODE- 250: Performed by: STUDENT IN AN ORGANIZED HEALTH CARE EDUCATION/TRAINING PROGRAM

## 2022-06-27 PROCEDURE — 74011250637 HC RX REV CODE- 250/637: Performed by: STUDENT IN AN ORGANIZED HEALTH CARE EDUCATION/TRAINING PROGRAM

## 2022-06-27 PROCEDURE — 74011250637 HC RX REV CODE- 250/637: Performed by: INTERNAL MEDICINE

## 2022-06-27 PROCEDURE — 74011250637 HC RX REV CODE- 250/637: Performed by: FAMILY MEDICINE

## 2022-06-27 PROCEDURE — 74011250637 HC RX REV CODE- 250/637: Performed by: NURSE PRACTITIONER

## 2022-06-27 PROCEDURE — 74011250637 HC RX REV CODE- 250/637: Performed by: PHYSICIAN ASSISTANT

## 2022-06-27 PROCEDURE — 97110 THERAPEUTIC EXERCISES: CPT

## 2022-06-27 RX ORDER — METOPROLOL TARTRATE 25 MG/1
25 TABLET, FILM COATED ORAL EVERY 12 HOURS
Status: DISCONTINUED | OUTPATIENT
Start: 2022-06-27 | End: 2022-06-29 | Stop reason: HOSPADM

## 2022-06-27 RX ORDER — DICLOFENAC SODIUM 10 MG/G
2 GEL TOPICAL 4 TIMES DAILY
Status: DISCONTINUED | OUTPATIENT
Start: 2022-06-27 | End: 2022-06-29 | Stop reason: HOSPADM

## 2022-06-27 RX ADMIN — EZETIMIBE 10 MG: 10 TABLET ORAL at 09:12

## 2022-06-27 RX ADMIN — ACETAMINOPHEN 650 MG: 325 TABLET ORAL at 00:23

## 2022-06-27 RX ADMIN — Medication 10 ML: at 22:00

## 2022-06-27 RX ADMIN — OXYCODONE 5 MG: 5 TABLET ORAL at 17:36

## 2022-06-27 RX ADMIN — DICLOFENAC SODIUM 2 G: 10 GEL TOPICAL at 23:10

## 2022-06-27 RX ADMIN — ACETAMINOPHEN 650 MG: 325 TABLET ORAL at 12:16

## 2022-06-27 RX ADMIN — METOPROLOL TARTRATE 25 MG: 25 TABLET, FILM COATED ORAL at 20:44

## 2022-06-27 RX ADMIN — ENOXAPARIN SODIUM 40 MG: 100 INJECTION SUBCUTANEOUS at 09:11

## 2022-06-27 RX ADMIN — ACETAMINOPHEN 650 MG: 325 TABLET ORAL at 18:48

## 2022-06-27 RX ADMIN — ACETAMINOPHEN 650 MG: 325 TABLET ORAL at 07:07

## 2022-06-27 RX ADMIN — ASPIRIN 81 MG: 81 TABLET, CHEWABLE ORAL at 09:11

## 2022-06-27 RX ADMIN — Medication 10 ML: at 07:07

## 2022-06-27 RX ADMIN — OXYCODONE 5 MG: 5 TABLET ORAL at 10:46

## 2022-06-27 RX ADMIN — DICLOFENAC SODIUM 2 G: 10 GEL TOPICAL at 17:21

## 2022-06-27 RX ADMIN — SODIUM CHLORIDE, PRESERVATIVE FREE 10 ML: 5 INJECTION INTRAVENOUS at 07:06

## 2022-06-27 RX ADMIN — LEVOTHYROXINE SODIUM 50 MCG: 0.05 TABLET ORAL at 07:07

## 2022-06-27 RX ADMIN — Medication 1 TABLET: at 12:16

## 2022-06-27 RX ADMIN — DOCUSATE SODIUM 50MG AND SENNOSIDES 8.6MG 1 TABLET: 8.6; 5 TABLET, FILM COATED ORAL at 17:36

## 2022-06-27 RX ADMIN — SODIUM CHLORIDE, PRESERVATIVE FREE 10 ML: 5 INJECTION INTRAVENOUS at 16:28

## 2022-06-27 RX ADMIN — ROSUVASTATIN CALCIUM 10 MG: 10 TABLET, FILM COATED ORAL at 23:09

## 2022-06-27 RX ADMIN — Medication 10 ML: at 16:28

## 2022-06-27 RX ADMIN — POLYETHYLENE GLYCOL 3350 17 G: 17 POWDER, FOR SOLUTION ORAL at 09:11

## 2022-06-27 RX ADMIN — DOCUSATE SODIUM 50MG AND SENNOSIDES 8.6MG 1 TABLET: 8.6; 5 TABLET, FILM COATED ORAL at 09:11

## 2022-06-27 RX ADMIN — SODIUM CHLORIDE 75 ML/HR: 9 INJECTION, SOLUTION INTRAVENOUS at 03:19

## 2022-06-27 NOTE — PROGRESS NOTES
Orthopaedic Progress Note  Post Op day: 3 Days Post-Op    2022 1:59 PM     Patient: Delfina Ozuna MRN: [id number]  SSN: [id number]    YOB: 1950  Age: 66 y.o. Sex: female      Admit date:  2022  Date of Surgery:  2022   Procedures:  Procedure(s):  HIP PERCUTANEOUS PINNING CANNULATED SCREWS  Admitting Physician:  Julita Greco MD   Surgeon:  Gino Loco) and Role:     * Micaela Leach MD - Primary    Consulting Physician(s): Treatment Team: Attending Provider: Camilla Lagos MD; Consulting Provider: Vish Moreira NP; Consulting Provider: Micaela Leach MD; Consulting Provider: Priscilla Lawrence NP; Care Manager: Maranda Reilly; Occupational Therapy Assistant: Toño Jaimes; Physical Therapy Assistant: Yadira Nolan    SUBJECTIVE:     Delfina Ozuna is a 66 y.o. female is 3 Days Post-Op s/p Procedure(s):  HIP PERCUTANEOUS PINNING CANNULATED SCREWS with an appropriate level of post-operative pain. She complains of left knee pain. She denies knee pain prior to the fall. No complaints of nausea, vomiting, dizziness, lightheadedness, chest pain, or shortness of breath. OBJECTIVE:       Physical Exam:  General: Alert, cooperative, no distress. Respiratory: Respirations unlabored  Neurological:  Neurovascular exam within normal limits. Motor: + DF/PF. Musculoskeletal: Calves soft, supple, non-tender upon palpation. Left knee:   No effusion. PROM: 0-40. Did not flex beyond this due to hip surgery. No lateral joint line tenderness. Negative lachman. Dressing/Wound:  Clean, dry and intact. No significant erythema or swelling.       Vital Signs:      Patient Vitals for the past 8 hrs:   BP Temp Resp SpO2   22 0916 (!) 152/66 98.6 °F (37 °C) 16 94 %                                          Temp (24hrs), Av.6 °F (37 °C), Min:98.5 °F (36.9 °C), Max:98.6 °F (37 °C)      Labs:        Recent Labs     22  8115 06/25/22  0417 06/25/22  0417   HCT  --   --  37.8   HGB 11.5   < > 12.2    < > = values in this interval not displayed. Lab Results   Component Value Date/Time    Sodium 141 06/25/2022 04:17 AM    Potassium 3.9 06/25/2022 04:17 AM    Chloride 108 06/25/2022 04:17 AM    CO2 25 06/25/2022 04:17 AM    Glucose 139 (H) 06/25/2022 04:17 AM    BUN 11 06/25/2022 04:17 AM    Creatinine 0.81 06/25/2022 04:17 AM    Calcium 8.3 (L) 06/25/2022 04:17 AM       PT/OT:        Gait  Ambulation - Level of Assistance: Minimal assistance,Additional time,Assist x1  Distance (ft): 10 Feet (ft)  Assistive Device: Walker, rolling,Gait belt       Patient mobility  Bed Mobility Training  Rolling: Moderate assistance  Supine to Sit: Moderate assistance,Assist x1,Additional time  Sit to Supine: Moderate assistance,Additional time  Scooting: Minimum assistance,Contact guard assistance  Transfer Training  Sit to Stand: Minimum assistance,Additional time,Assist x1  Stand to Sit: Minimum assistance,Additional time,Assist x1  Bed to Chair: Minimum assistance,Assist x1,Additional time      Gait Training  Assistive Device: Walker, rolling,Gait belt  Ambulation - Level of Assistance: Minimal assistance,Additional time,Assist x1  Distance (ft): 10 Feet (ft)   Weight Bearing Status  Left Side Weight Bearing: As tolerated        ASSESSMENT / PLAN:   Principal Problem:    Femur fracture ([address] (6/24/2022)    Active Problems:    Thyroid disease ()      Ill-defined condition ()      Overview: osteoporosis      CAD (coronary artery disease) ()      Overview: heart attack/stents      History of CVA (cerebrovascular accident) (6/24/2022)      Overview: Noted on MRI in 11/2021 => chronic infarct L centrum semiovale         A: 1. S/P Left hip CRPP POD 3    P: 1. PT/OT: WBAT with RW.  2. Left knee pain: likely referred pain.   She has medial compartment OA and pain is lateral.  There is no joint effusion, so unlikely there is an acute fracture or lateral meniscal pathology. Ice. Voltaren gel 1% QID. 3. DVT ppx: Lovenox daily for 1 month  4.  DISPO: SNF versus         Signed By:  Myriam Patel, [address] [facility] 114

## 2022-06-27 NOTE — PROGRESS NOTES
6/27/2022  4:10 PM  Care Management Progress Note      ICD-10-CM ICD-9-CM    1. Other closed fracture of head or neck of left femur, initial [facility]  S72.092A 820.09    2. Coronary artery disease involving native coronary artery of native heart without angina pectoris  I25.10 414.01    3. Pre-operative cardiovascular examination  Z01.810 V72.81        RUR:  7%  Risk Level: [x]Low []Moderate []High  Value-based purchasing: [] Yes [x] No  Bundle patient: [] Yes [x] No   Specify:     Transition of care plan:  1. Awaiting medical clearance and DC order. PT/OT treating. Ortho following. 2. IPR - Referral pending with [facility]. Covid PCR pending for placement. 3. Outpatient follow-up. 4. Transport need TBD.

## 2022-06-27 NOTE — PROGRESS NOTES
6/26 2000: RN spoke with Dr. Mracos Coulter due to patient being hypertensive with bp 171. RN informed provider that patient has not taken home bp meds since her admission to the hospital. Patient stated she takes 25mg of metoprolol twice a day and 50mg of losartan prn. Provider stated to only treat bp with prn hydralazine that is ordered for the patient and day team will discuss the patient resuming home medications.

## 2022-06-27 NOTE — PROGRESS NOTES
Bedside shift change report given to Franck (oncoming nurse) by Radha Black RN (offgoing nurse). Report included the following information SBAR, Kardex, MAR, Recent Results and Med Rec Status.

## 2022-06-27 NOTE — PROGRESS NOTES
Iraj Handy [facility] 79  [address] [facility], [place], [address] [place] [address]  [phone] [address] Adult  Hospitalist Group                                                                                          Hospitalist Progress Note  Guille Landeros MD        Date of Service:  2022  NAME:  Roro Lou  :  1950  MRN:  [id number]      Admission Summary:   78-year-old female presented with a left hip fracture after a fall at home    Interval history / Subjective:   Complains of pain in her left hip     Assessment & Plan:     Femur fracture  -after GLF at home   -S/p hip percutaneous pinning cannulated screws 2022  -Awaiting inpatient rehab    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      Hypothyroidism  S/p total thyroidectomy . -chronic   · Continue supplementation with levothyroxine     Hx of CVA in the past -  · Continue supportive care      Osteoporosis  -pt received last injection about a year ago by her endcrinologist   · Add VIt D and Ca+ supplementation      Hx of PAD -s/p Right Sup Fem artery PCI   · Continue Plavix as is     Code status: Full  DVT prophylaxis: [facility] Problems  Date Reviewed: 2022          Codes Class Noted POA    * (Principal) Femur fracture ([facility] ICD-10-CM: O52.50CX  ICD-9-CM: 821.00  2022 Yes        Thyroid disease ICD-10-CM: E07.9  ICD-9-CM: 246. 9  Unknown Yes        Ill-defined condition ICD-10-CM: R69  ICD-9-CM: 799.89  Unknown Yes    Overview Signed 2022  1:05 PM by Sirena Fuller MD     osteoporosis             CAD (coronary artery disease) ICD-10-CM: I25.10  ICD-9-CM: 414.00  Unknown Yes    Overview Signed 2022  1:05 PM by Sirena Fuller MD     heart attack/stents             History of CVA (cerebrovascular accident) ICD-10-CM: Z86.73  ICD-9-CM: V12.54  6/24/2022 Yes    Overview Addendum 6/24/2022  1:07 PM by Sami Hinds MD     Noted on MRI in 11/2021 => chronic infarct L centrum semiovale                     Review of Systems:   A comprehensive review of systems was negative except for that written in the HPI. Vital Signs:    Last 24hrs VS reviewed since prior progress note. Most recent are:  Visit Vitals  BP (!) 152/66 (BP 1 Location: Left upper arm, BP Patient Position: At rest)   Pulse 70   Temp 98.6 °F (37 °C)   Resp 16   Ht 5' 2\" (1.575 m)   Wt 61.6 kg (135 lb 12.9 oz)   SpO2 94%   BMI 24.84 kg/m²         Intake/Output Summary (Last 24 hours) at 6/27/2022 1337  Last data filed at 6/27/2022 0900  Gross per 24 hour   Intake 240 ml   Output --   Net 240 ml        Physical Examination:             Constitutional:  No acute distress, cooperative, pleasant    ENT:  Oral mucosa moist, oropharynx benign. Resp:  CTA bilaterally. No wheezing/rhonchi/rales. No accessory muscle use   CV:  Regular rhythm, normal rate, no murmurs, gallops, rubs    GI:  Soft, non distended, non tender. normoactive bowel sounds, no hepatosplenomegaly     Musculoskeletal:  No edema, warm, 2+ pulses throughout    Neurologic:  Moves all extremities. AAOx3, CN II-XII reviewed     Psych:  Good insight, Not anxious nor agitated. Data Review:    Review and/or order of clinical lab test      Labs:     Recent Labs     06/26/22 0415 06/25/22 0417   WBC  --  13.1*   HGB 11.5 12.2   HCT  --  37.8   PLT  --  184     Recent Labs     06/25/22 0417      K 3.9      CO2 25   BUN 11   CREA 0.81   *   CA 8.3*   MG 1.8     No results for input(s): ALT, AP, TBIL, TBILI, TP, ALB, GLOB, GGT, AML, LPSE in the last 72 hours. No lab exists for component: SGOT, GPT, AMYP, HLPSE  No results for input(s): INR, PTP, APTT, INREXT in the last 72 hours.    No results for input(s): FE, TIBC, PSAT, FERR in the last 72 hours. Lab Results   Component Value Date/Time    Folate 9.3 11/29/2021 02:00 AM      No results for input(s): PH, PCO2, PO2 in the last 72 hours. No results for input(s): CPK, CKNDX, TROIQ in the last 72 hours.     No lab exists for component: CPKMB  Lab Results   Component Value Date/Time    Cholesterol, total 161 11/29/2021 02:00 AM    HDL Cholesterol 60 11/29/2021 02:00 AM    LDL, calculated 80.4 11/29/2021 02:00 AM    Triglyceride 103 11/29/2021 02:00 AM    CHOL/HDL Ratio 2.7 11/29/2021 02:00 AM     Lab Results   Component Value Date/Time    Glucose (POC) 88 11/28/2021 03:36 PM     Lab Results   Component Value Date/Time    Color YELLOW/STRAW 06/24/2022 06:40 PM    Appearance CLEAR 06/24/2022 06:40 PM    Specific gravity 1.015 06/24/2022 06:40 PM    Specific gravity 1.016 11/28/2021 04:48 PM    pH (UA) 6.5 06/24/2022 06:40 PM    Protein Negative 06/24/2022 06:40 PM    Glucose Negative 06/24/2022 06:40 PM    Ketone Negative 06/24/2022 06:40 PM    Bilirubin Negative 06/24/2022 06:40 PM    Urobilinogen 0.2 06/24/2022 06:40 PM    Nitrites Negative 06/24/2022 06:40 PM    Leukocyte Esterase Negative 06/24/2022 06:40 PM    Epithelial cells FEW 06/24/2022 06:40 PM    Bacteria Negative 06/24/2022 06:40 PM    WBC 0-4 06/24/2022 06:40 PM    RBC 0-5 06/24/2022 06:40 PM         Medications Reviewed:     Current Facility-Administered Medications   Medication Dose Route Frequency    diclofenac (VOLTAREN) 1 % topical gel 2 g  2 g Topical QID    sodium chloride (NS) flush 5-40 mL  5-40 mL IntraVENous Q8H    sodium chloride (NS) flush 5-40 mL  5-40 mL IntraVENous PRN    acetaminophen (TYLENOL) tablet 650 mg  650 mg Oral Q6H    oxyCODONE IR (ROXICODONE) tablet 2.5 mg  2.5 mg Oral Q4H PRN    oxyCODONE IR (ROXICODONE) tablet 5 mg  5 mg Oral Q4H PRN    fentaNYL citrate (PF) injection 25 mcg  25 mcg IntraVENous Q4H PRN    sodium chloride (NS) flush 5-40 mL  5-40 mL IntraVENous Q8H    sodium chloride (NS) flush 5-40 mL 5-40 mL IntraVENous PRN    azelastine (ASTELIN) 137mcg/spray nasal spray  1 Spray Both Nostrils BID PRN    ezetimibe (ZETIA) tablet 10 mg  10 mg Oral DAILY    levothyroxine (SYNTHROID) tablet 50 mcg  50 mcg Oral ACB    rosuvastatin (CRESTOR) tablet 10 mg  10 mg Oral QHS    vitamin B-V-X-lutein-minerals (OCUVITE) tablet 1 Tablet  1 Tablet Oral DAILY    hydrALAZINE (APRESOLINE) 20 mg/mL injection 10 mg  10 mg IntraVENous Q6H PRN    aspirin chewable tablet 81 mg  81 mg Oral DAILY    [Held by provider] metoprolol tartrate (LOPRESSOR) tablet 25 mg  25 mg Oral Q12H    sodium chloride (NS) flush 5-40 mL  5-40 mL IntraVENous Q8H    sodium chloride (NS) flush 5-40 mL  5-40 mL IntraVENous PRN    naloxone (NARCAN) injection 0.4 mg  0.4 mg IntraVENous PRN    calcium-vitamin D (OS-ANTWAN +D3) 500 mg-200 unit per tablet 1 Tablet  1 Tablet Oral TID WITH MEALS    senna-docusate (PERICOLACE) 8.6-50 mg per tablet 1 Tablet  1 Tablet Oral BID    polyethylene glycol (MIRALAX) packet 17 g  17 g Oral DAILY    bisacodyL (DULCOLAX) suppository 10 mg  10 mg Rectal DAILY PRN    enoxaparin (LOVENOX) injection 40 mg  40 mg SubCUTAneous DAILY     ______________________________________________________________________  EXPECTED LENGTH OF STAY: 2d 19h  ACTUAL LENGTH OF STAY:          3                 Chrystal Grant MD

## 2022-06-27 NOTE — PROGRESS NOTES
Bedside shift change report given to Elijah Barbosa RN (oncoming nurse) by Jos Santos RN (offgoing nurse). Report included the following information SBAR, Kardex and Recent Results.

## 2022-06-27 NOTE — PROGRESS NOTES
Comprehensive Nutrition Assessment    Type and Reason for Visit: Consult    Nutrition Recommendations/Plan:   1. Change ONS to Low Carb/High Protein product: ensure High Protein daily provides 160 kcals, 16 g protein and 19 g Carb  2. Encourage adequate PO fluid intake for constipation      Malnutrition Assessment:  Malnutrition Status:  Mild malnutrition (06/27/22 1846)    Context:  Acute illness     Findings of the 6 clinical characteristics of malnutrition:   Energy Intake:  No significant decrease in energy intake  Weight Loss:  No significant weight loss     Body Fat Loss:  Mild body fat loss, Triceps   Muscle Mass Loss:  Mild muscle mass loss, Calf,Scapula (trapezius)  Fluid Accumulation:  No significant fluid accumulation,     Strength:  Not performed     Nutrition Assessment:      1027: 66year old female admitted for Femur fracture ([facility] [S72.90XA] who  has a past medical history of Arthritis, CAD (coronary artery disease), [facility] Hypertension, and Thyroid disease. RD visited pt's room first time & she was working with PT. Nutrition Focused Physical Exam completed. RD returned to interview pt regarding Ensure supplements. Pt won't drink them because she has been trying to lose weight & doesn't want the extra 350 calories. Pt is willing to trial \"lighter version\" of protein drink. Pt reports constipation. Usually juice will help her have a BM, but it has not yet. She has a bowel regimen but has not requested the suppository yet. She is willing to try prune juice first. RD ordered a one time request.     Pt asks about calcium supplements & if she should take them. RD advised use of supplements as ordered especially related to healthy bones and it is usually good practice. She is concerned it will cause constipation. Pt is reminded of side effects of pain medications & her admitted decrease in oral hydration are more likely to cause immediate constipation.        Nutrition Related Findings: Wound Type: Surgical incision    Last Bowel Movement Date: 06/23/22  Abdominal Assessment: Intact,Soft  Appetite: Fair  Edema:No data recorded    Nutr. Labs:    Lab Results   Component Value Date/Time    GFR est AA >60 06/25/2022 04:17 AM    GFR est non-AA >60 06/25/2022 04:17 AM    Creatinine 0.81 06/25/2022 04:17 AM    BUN 11 06/25/2022 04:17 AM    Sodium 141 06/25/2022 04:17 AM    Potassium 3.9 06/25/2022 04:17 AM    Chloride 108 06/25/2022 04:17 AM    CO2 25 06/25/2022 04:17 AM     Lab Results   Component Value Date/Time    Glucose 139 (H) 06/25/2022 04:17 AM    Glucose (POC) 88 11/28/2021 03:36 PM     Lab Results   Component Value Date/Time    Hemoglobin A1c 5.4 11/29/2021 02:00 AM     Nutr. Meds:  Tylenol, Calcium (refused last 2 days due to constipation), Zetia, Lovenox, synthroid, Miralax, Crestor, sandra colace,   PRN: dulcolax, Roxicodone     Current Nutrition Intake & Therapies:  Average Meal Intake: 51-75%  Average Supplement Intake: Refusing to take (Ensure Enlive \"has too many calories\")  ADULT DIET Regular  ADULT ORAL NUTRITION SUPPLEMENT Dinner; Low Calorie/High Protein  DIET ONE TIME MESSAGE    Documented Meal intake:  Patient Vitals for the past 168 hrs:   % Diet Eaten   06/27/22 0900 26 - 50%   06/25/22 1655 76 - 100%     Documentation of supplement intake:  No data found. Anthropometric Measures:  Height: 5' 2\" (157.5 cm)  Ideal Body Weight (IBW): 110 lbs (50 kg)  Admission Body Weight: 128 lb 1.4 oz  Current Body Wt:  61.6 kg (135 lb 12.9 oz), 123.5 % IBW.  Standing scale  Current BMI (kg/m2): 24.8  Usual Body Weight: 61.2 kg (135 lb)  % Weight Change (Calculated): 0.6  Weight Adjustment: No adjustment                 BMI Category: Normal weight (BMI 22.0-24.9) age over 72    Last 3 Recorded Weights in this Encounter    06/24/22 1024 06/26/22 1435   Weight: 58.1 kg (128 lb) 61.6 kg (135 lb 12.9 oz)     Wt Readings from Last 10 Encounters:   06/26/22 61.6 kg (135 lb 12.9 oz)   11/30/21 62.8 kg (138 lb 7.2 oz)   08/29/19 63 kg (139 lb)   08/30/16 60.8 kg (134 lb)   ]  Estimated Daily Nutrient Needs:  Energy Requirements Based On: Formula  Weight Used for Energy Requirements: Current  Energy (kcal/day): 1400 - 1500 kcal/d (MSJ xAF 1.3 - 1.4)  Weight Used for Protein Requirements: Current  Protein (g/day): 60-74 g (1-1.2 g/kg)  Method Used for Fluid Requirements: 1 ml/kcal  Fluid (ml/day): 5565-5796 mL/d    Nutrition Diagnosis:   · Inadequate protein intake related to increased demand for energy/nutrients as evidenced by wounds,mild loss of subcutaneous fat,moderate muscle loss from observation with PT. Nutrition Interventions:   Food and/or Nutrient Delivery: Continue current diet,Modify oral nutrition supplement  Nutrition Education/Counseling: Education initiated  Coordination of Nutrition Care: Continue to monitor while inpatient  Plan of Care discussed with: patient, family at bedside, physical therapy    Goals:     Goals: other (specify)  Specify Other Goals: tolerance of > 50% of ONS offered over the next 3-5 days;  Constipation relief over the next 1-2 days    Nutrition Monitoring and Evaluation:   Behavioral-Environmental Outcomes: Readiness for change  Food/Nutrient Intake Outcomes: Food and nutrient intake,Supplement intake  Physical Signs/Symptoms Outcomes: Meal time behavior,Constipation,Weight    Discharge Planning:    Continue oral nutrition supplement    Nayla Mohr MS, RD  Contact via [employer] or office [phone]

## 2022-06-27 NOTE — DISCHARGE INSTR - DIET
Good nutrition is important when healing from an illness, injury, or surgery. Follow any nutrition recommendations given to you during your hospital stay. If you were given an oral nutrition supplement while in the hospital, continue to take this supplement at home. You can take it with meals, in-between meals, and/or before bedtime. These supplements can be purchased at most local grocery stores, pharmacies, and chain super-stores. If you have any questions about your diet or nutrition, call the hospital and ask for the dietitian. Nutrition Recommendations for Discharge:             Continue Oral Nutrition Supplements at discharge:   Ensure High Protein for Muscle Health or similar product  Once daily for 30 days unless otherwise directed by your Primary Care Physician. This product can be purchased at your local grocery store, pharmacy and/or online.       Tulio Willis, [address] [place] [address]  Office [phone]

## 2022-06-27 NOTE — PROGRESS NOTES
Problem: Mobility Impaired (Adult and Pediatric)  Goal: *Acute Goals and Plan of Care (Insert Text)  Description: FUNCTIONAL STATUS PRIOR TO ADMISSION: Patient was independent and active without use of DME.    HOME SUPPORT PRIOR TO ADMISSION: The patient lived with son but did not require assist.    Physical Therapy Goals  Initiated 6/25/2022  1. Patient will move from supine to sit and sit to supine  in bed with minimal assistance/contact guard assist within 7 day(s). 2.  Patient will transfer from bed to chair and chair to bed with minimal assistance/contact guard assist using the least restrictive device within 7 day(s). 3.  Patient will perform sit to stand with supervision/set-up within 7 day(s). 4.  Patient will ambulate with supervision/set-up for 75 feet with the least restrictive device within 7 day(s). 5.  Patient will ascend/descend 5 stairs with unilateral handrail(s) with minimal assistance/contact guard assist within 7 day(s). Outcome: Progressing Towards Goal  Note:   PHYSICAL THERAPY TREATMENT  Patient: Frandy Angel (12 y.o. female)  Date: 6/27/2022  Diagnosis: Femur fracture (HCC) [S72.90XA] Femur fracture (HCC)  Procedure(s) (LRB):  HIP PERCUTANEOUS PINNING CANNULATED SCREWS (Left) 3 Days Post-Op  Precautions: Fall,WBAT  Chart, physical therapy assessment, plan of care and goals were reviewed. ASSESSMENT  Patient continues with skilled PT services and progressing towards goals. Improved pain control this afternoon. Min A x 1 with increased time to come to EOB. Max verbal cues for sequencing and hand placement. Improved sequencing of gait with AFO in R shoe. Recommend tennis shoe to improved orthotic fit. Current Level of Function Impacting Discharge (mobility/balance): MIn A    Other factors to consider for discharge:          PLAN :  Patient continues to benefit from skilled intervention to address the above impairments. Continue treatment per established plan of care.   to address goals. Recommendation for discharge: (in order for the patient to meet his/her long term goals)  Therapy 3 hours per day 5-7 days per week    This discharge recommendation:  Has been made in collaboration with the attending provider and/or case management    IF patient discharges home will need the following DME: to be determined (TBD)       SUBJECTIVE:   Patient stated I don't liek the brace.     OBJECTIVE DATA SUMMARY:   Critical Behavior:  Neurologic State: Alert,Appropriate for age  Orientation Level: Oriented X4  Cognition: Appropriate decision making,Appropriate for age attention/concentration,Appropriate safety awareness  Safety/Judgement: Awareness of environment,Decreased awareness of need for assistance,Decreased awareness of need for safety,Decreased insight into deficits,Fall prevention,Home safety  Functional Mobility Training:  Bed Mobility:     Supine to Sit: Additional time;Assist x1;Minimum assistance     Scooting: Minimum assistance;Assist x1;Additional time        Transfers:  Sit to Stand: Minimum assistance;Assist x1;Additional time  Stand to Sit: Minimum assistance; Additional time;Assist x1                             Balance:  Sitting: Intact; With support  Sitting - Static: Good (unsupported)  Sitting - Dynamic: Fair (occasional)  Standing: Impaired  Standing - Static: Constant support; Fair  Standing - Dynamic : Constant support; Fair  Ambulation/Gait Training:  Distance (ft): 15 Feet (ft)  Assistive Device: Walker, rolling;Gait belt;Brace/Splint (AFO on RLE)  Ambulation - Level of Assistance: Minimal assistance; Additional time;Assist x1                                               Stairs:               Therapeutic Exercises:     Pain Rating:  3/10    Activity Tolerance:   Good    After treatment patient left in no apparent distress:   Sitting in chair, Call bell within reach, and Bed / chair alarm activated    COMMUNICATION/COLLABORATION:   The patients plan of care was discussed with: Registered nurse.      Steph Borjas   Time Calculation: 30 mins

## 2022-06-27 NOTE — PROGRESS NOTES
Problem: Falls - Risk of  Goal: *Absence of Falls  Description: Document Christo Delong Fall Risk and appropriate interventions in the flowsheet. Outcome: Progressing Towards Goal  Note: Fall Risk Interventions:            Medication Interventions: Bed/chair exit alarm    Elimination Interventions: Call light in reach,Bed/chair exit alarm    History of Falls Interventions: Bed/chair exit alarm         Problem: Patient Education: Go to Patient Education Activity  Goal: Patient/Family Education  Outcome: Progressing Towards Goal     Problem: Patient Education: Go to Patient Education Activity  Goal: Patient/Family Education  Outcome: Progressing Towards Goal     Problem: Patient Education: Go to Patient Education Activity  Goal: Patient/Family Education  Outcome: Progressing Towards Goal     Problem: Pain  Goal: *Control of Pain  Outcome: Progressing Towards Goal  Goal: *PALLIATIVE CARE:  Alleviation of Pain  Outcome: Progressing Towards Goal     Problem: Patient Education: Go to Patient Education Activity  Goal: Patient/Family Education  Outcome: Progressing Towards Goal     Problem: Hypertension  Goal: *Blood pressure within specified parameters  Outcome: Progressing Towards Goal  Goal: *Fluid volume balance  Outcome: Progressing Towards Goal  Goal: *Labs within defined limits  Outcome: Progressing Towards Goal     Problem: Patient Education: Go to Patient Education Activity  Goal: Patient/Family Education  Outcome: Progressing Towards Goal     Problem: Risk for Spread of Infection  Goal: Prevent transmission of infectious organism to others  Description: Prevent the transmission of infectious organisms to other patients, staff members, and visitors.   Outcome: Progressing Towards Goal     Problem: Patient Education:  Go to Education Activity  Goal: Patient/Family Education  Outcome: Progressing Towards Goal

## 2022-06-27 NOTE — PROGRESS NOTES
Problem: Falls - Risk of  Goal: *Absence of Falls  Description: Document Skylar Jacques Fall Risk and appropriate interventions in the flowsheet.   Outcome: Progressing Towards Goal  Note: Fall Risk Interventions:  Mobility Interventions: Bed/chair exit alarm         Medication Interventions: Bed/chair exit alarm    Elimination Interventions: Bed/chair exit alarm,Call light in reach    History of Falls Interventions: Bed/chair exit alarm         Problem: Pain  Goal: *Control of Pain  Outcome: Progressing Towards Goal     Problem: Patient Education: Go to Patient Education Activity  Goal: Patient/Family Education  Outcome: Progressing Towards Goal

## 2022-06-27 NOTE — PROGRESS NOTES
Problem: Self Care Deficits Care Plan (Adult)  Goal: *Acute Goals and Plan of Care (Insert Text)  Description: FUNCTIONAL STATUS PRIOR TO ADMISSION: Patient was independent and active without use of DME. Patient with hx of right foot drop, sx on right foot/LE in 2010, had sx on her left eye 3/30/22 and with continued blurry vision and at times double vision and nausea, has not mowed lawn in 2 years however performed a few weeks ago. Reports many near falls/catches herself since vision sx, takes time and moves slow at baseline due to foot drop    HOME SUPPORT: The patient lived with her son who works but did not require assist..      Occupational Therapy Goals  Initiated 6/25/2022  1. Patient will perform lower body ADLs with AE minimal assistance/contact guard assist within 7 day(s). 2.  Patient will perform upper body ADLs standing 5 mins without fatigue or LOB with minimal assistance/contact guard assist within 7 day(s). 3.  Patient will perform toilet transfer with minimal assistance/contact guard assist within 7 day(s). 4.  Patient will perform all aspects of toileting with minimal assistance/contact guard assist within 7 day(s). 5.  Patient will demonstrate good safety awareness during ADL tasks and mobility within 7 day(s). Outcome: Progressing Towards Goal   OCCUPATIONAL THERAPY TREATMENT  Patient: Russell Lim (72 y.o. female)  Date: 6/27/2022  Diagnosis: Femur fracture (HCC) [S72.90XA] Femur fracture ([address]  Procedure(s) (LRB):  HIP PERCUTANEOUS PINNING CANNULATED SCREWS (Left) 3 Days Post-Op  Precautions: Fall,WBAT  Chart, occupational therapy assessment, plan of care, and goals were reviewed. ASSESSMENT  Patient continues with skilled OT services and is progressing towards goals. Pt seated in chair, educated as to AE for bathing and dressing. Pt used long handle shoe horn for slip on shoes set-up on R, min assist for L and pt has foot drop.      Current Level of Function Impacting Discharge (ADLs): Min assist LB dress with AE    Other factors to consider for discharge:          PLAN :  Patient continues to benefit from skilled intervention to address the above impairments. Continue treatment per established plan of care to address goals. Recommend with staff: out of bed for ADL's, there ex, there act, meals    Recommend next OT session: cont towards goals    Recommendation for discharge: (in order for the patient to meet his/her long term goals)  Therapy 3 hours per day 5-7 days per week    This discharge recommendation:  Has not yet been discussed the attending provider and/or case management    IF patient discharges home will need the following DME:        SUBJECTIVE:   Patient stated I would like to write it down my memory is not so good.     OBJECTIVE DATA SUMMARY:   Cognitive/Behavioral Status:  Neurologic State: Alert  Orientation Level: Oriented X4  Cognition: Appropriate decision making             Functional Mobility and Transfers for ADLs:  Bed Mobility:  Supine to Sit: Additional time;Assist x1;Minimum assistance  Scooting: Minimum assistance;Assist x1;Additional time    Transfers:  Sit to Stand: Minimum assistance;Assist x1;Additional time          Balance:  Sitting: Intact; With support  Sitting - Static: Good (unsupported)  Sitting - Dynamic: Fair (occasional)  Standing: Impaired  Standing - Static: Constant support; Fair  Standing - Dynamic : Constant support; Fair    ADL Intervention:       Lower Body Dressing Assistance  Slip on Shoes with Back: Set-up  Position Performed: Seated in chair  Adaptive Equipment Used: Long handled shoe horn             Activity Tolerance:   Fair    After treatment patient left in no apparent distress:   Sitting in chair and Call bell within reach    COMMUNICATION/COLLABORATION:   The patients plan of care was discussed with: Physical therapy assistant and Occupational therapist.     KIRSTY Keller  Time Calculation: 15 mins

## 2022-06-28 LAB
BASOPHILS # BLD: 0.1 K/UL (ref 0–0.1)
BASOPHILS NFR BLD: 1 % (ref 0–1)
DIFFERENTIAL METHOD BLD: ABNORMAL
EOSINOPHIL # BLD: 0.6 K/UL (ref 0–0.4)
EOSINOPHIL NFR BLD: 10 % (ref 0–7)
ERYTHROCYTE [DISTWIDTH] IN BLOOD BY AUTOMATED COUNT: 13.2 % (ref 11.5–14.5)
HCT VFR BLD AUTO: 36.2 % (ref 35–47)
HGB BLD-MCNC: 11.5 G/DL (ref 11.5–16)
IMM GRANULOCYTES # BLD AUTO: 0 K/UL (ref 0–0.04)
IMM GRANULOCYTES NFR BLD AUTO: 1 % (ref 0–0.5)
LYMPHOCYTES # BLD: 1.5 K/UL (ref 0.8–3.5)
LYMPHOCYTES NFR BLD: 23 % (ref 12–49)
MCH RBC QN AUTO: 30.7 PG (ref 26–34)
MCHC RBC AUTO-ENTMCNC: 31.8 G/DL (ref 30–36.5)
MCV RBC AUTO: 96.5 FL (ref 80–99)
MONOCYTES # BLD: 0.6 K/UL (ref 0–1)
MONOCYTES NFR BLD: 9 % (ref 5–13)
NEUTS SEG # BLD: 3.5 K/UL (ref 1.8–8)
NEUTS SEG NFR BLD: 56 % (ref 32–75)
NRBC # BLD: 0 K/UL (ref 0–0.01)
NRBC BLD-RTO: 0 PER 100 WBC
PLATELET # BLD AUTO: 178 K/UL (ref 150–400)
PMV BLD AUTO: 11.3 FL (ref 8.9–12.9)
RBC # BLD AUTO: 3.75 M/UL (ref 3.8–5.2)
WBC # BLD AUTO: 6.3 K/UL (ref 3.6–11)

## 2022-06-28 PROCEDURE — 74011250637 HC RX REV CODE- 250/637: Performed by: INTERNAL MEDICINE

## 2022-06-28 PROCEDURE — 74011250637 HC RX REV CODE- 250/637: Performed by: NURSE PRACTITIONER

## 2022-06-28 PROCEDURE — 94761 N-INVAS EAR/PLS OXIMETRY MLT: CPT

## 2022-06-28 PROCEDURE — 97110 THERAPEUTIC EXERCISES: CPT

## 2022-06-28 PROCEDURE — 74011000250 HC RX REV CODE- 250: Performed by: STUDENT IN AN ORGANIZED HEALTH CARE EDUCATION/TRAINING PROGRAM

## 2022-06-28 PROCEDURE — 74011250636 HC RX REV CODE- 250/636: Performed by: STUDENT IN AN ORGANIZED HEALTH CARE EDUCATION/TRAINING PROGRAM

## 2022-06-28 PROCEDURE — 65270000029 HC RM PRIVATE

## 2022-06-28 PROCEDURE — 36415 COLL VENOUS BLD VENIPUNCTURE: CPT

## 2022-06-28 PROCEDURE — 74011250636 HC RX REV CODE- 250/636: Performed by: NURSE PRACTITIONER

## 2022-06-28 PROCEDURE — 85025 COMPLETE CBC W/AUTO DIFF WBC: CPT

## 2022-06-28 PROCEDURE — 74011000250 HC RX REV CODE- 250: Performed by: NURSE PRACTITIONER

## 2022-06-28 PROCEDURE — 74011250637 HC RX REV CODE- 250/637: Performed by: FAMILY MEDICINE

## 2022-06-28 PROCEDURE — 74011000250 HC RX REV CODE- 250: Performed by: INTERNAL MEDICINE

## 2022-06-28 PROCEDURE — 74011250637 HC RX REV CODE- 250/637: Performed by: STUDENT IN AN ORGANIZED HEALTH CARE EDUCATION/TRAINING PROGRAM

## 2022-06-28 PROCEDURE — 97116 GAIT TRAINING THERAPY: CPT

## 2022-06-28 PROCEDURE — 97535 SELF CARE MNGMENT TRAINING: CPT

## 2022-06-28 RX ADMIN — ENOXAPARIN SODIUM 40 MG: 100 INJECTION SUBCUTANEOUS at 08:53

## 2022-06-28 RX ADMIN — DOCUSATE SODIUM 50MG AND SENNOSIDES 8.6MG 1 TABLET: 8.6; 5 TABLET, FILM COATED ORAL at 08:52

## 2022-06-28 RX ADMIN — OXYCODONE 5 MG: 5 TABLET ORAL at 09:03

## 2022-06-28 RX ADMIN — METOPROLOL TARTRATE 25 MG: 25 TABLET, FILM COATED ORAL at 21:53

## 2022-06-28 RX ADMIN — DOCUSATE SODIUM 50MG AND SENNOSIDES 8.6MG 1 TABLET: 8.6; 5 TABLET, FILM COATED ORAL at 17:14

## 2022-06-28 RX ADMIN — ACETAMINOPHEN 650 MG: 325 TABLET ORAL at 01:21

## 2022-06-28 RX ADMIN — DICLOFENAC SODIUM 2 G: 10 GEL TOPICAL at 21:54

## 2022-06-28 RX ADMIN — ACETAMINOPHEN 650 MG: 325 TABLET ORAL at 12:31

## 2022-06-28 RX ADMIN — LEVOTHYROXINE SODIUM 50 MCG: 0.05 TABLET ORAL at 06:46

## 2022-06-28 RX ADMIN — Medication 1 TABLET: at 08:52

## 2022-06-28 RX ADMIN — EZETIMIBE 10 MG: 10 TABLET ORAL at 08:52

## 2022-06-28 RX ADMIN — OXYCODONE 5 MG: 5 TABLET ORAL at 15:23

## 2022-06-28 RX ADMIN — METOPROLOL TARTRATE 25 MG: 25 TABLET, FILM COATED ORAL at 08:52

## 2022-06-28 RX ADMIN — SODIUM CHLORIDE, PRESERVATIVE FREE 10 ML: 5 INJECTION INTRAVENOUS at 15:23

## 2022-06-28 RX ADMIN — POLYETHYLENE GLYCOL 3350 17 G: 17 POWDER, FOR SOLUTION ORAL at 08:53

## 2022-06-28 RX ADMIN — Medication 10 ML: at 21:55

## 2022-06-28 RX ADMIN — ASPIRIN 81 MG: 81 TABLET, CHEWABLE ORAL at 08:52

## 2022-06-28 RX ADMIN — DICLOFENAC SODIUM 2 G: 10 GEL TOPICAL at 15:23

## 2022-06-28 RX ADMIN — FENTANYL CITRATE 25 MCG: 50 INJECTION INTRAMUSCULAR; INTRAVENOUS at 17:16

## 2022-06-28 RX ADMIN — ROSUVASTATIN CALCIUM 10 MG: 10 TABLET, FILM COATED ORAL at 21:53

## 2022-06-28 RX ADMIN — ACETAMINOPHEN 650 MG: 325 TABLET ORAL at 06:46

## 2022-06-28 RX ADMIN — SODIUM CHLORIDE, PRESERVATIVE FREE 10 ML: 5 INJECTION INTRAVENOUS at 06:46

## 2022-06-28 RX ADMIN — DICLOFENAC SODIUM 2 G: 10 GEL TOPICAL at 17:15

## 2022-06-28 RX ADMIN — SODIUM CHLORIDE, PRESERVATIVE FREE 10 ML: 5 INJECTION INTRAVENOUS at 15:22

## 2022-06-28 RX ADMIN — ACETAMINOPHEN 650 MG: 325 TABLET ORAL at 18:39

## 2022-06-28 RX ADMIN — DICLOFENAC SODIUM 2 G: 10 GEL TOPICAL at 08:53

## 2022-06-28 RX ADMIN — Medication 10 ML: at 06:46

## 2022-06-28 RX ADMIN — SODIUM CHLORIDE, PRESERVATIVE FREE 10 ML: 5 INJECTION INTRAVENOUS at 21:55

## 2022-06-28 RX ADMIN — Medication 10 ML: at 15:22

## 2022-06-28 NOTE — PROGRESS NOTES
Problem: Falls - Risk of  Goal: *Absence of Falls  Description: Document Ace Gusman Fall Risk and appropriate interventions in the flowsheet.   Outcome: Progressing Towards Goal  Note: Fall Risk Interventions:  Mobility Interventions: Bed/chair exit alarm,Patient to call before getting OOB         Medication Interventions: Evaluate medications/consider consulting pharmacy,Bed/chair exit alarm    Elimination Interventions: Bed/chair exit alarm,Call light in reach,Patient to call for help with toileting needs    History of Falls Interventions: Bed/chair exit alarm         Problem: Patient Education: Go to Patient Education Activity  Goal: Patient/Family Education  Outcome: Progressing Towards Goal     Problem: Patient Education: Go to Patient Education Activity  Goal: Patient/Family Education  Outcome: Progressing Towards Goal     Problem: Pain  Goal: *Control of Pain  Outcome: Progressing Towards Goal     Problem: Patient Education: Go to Patient Education Activity  Goal: Patient/Family Education  Outcome: Progressing Towards Goal     Problem: Hypertension  Goal: *Blood pressure within specified parameters  Outcome: Progressing Towards Goal     Problem: Patient Education: Go to Patient Education Activity  Goal: Patient/Family Education  Outcome: Progressing Towards Goal     Problem: Patient Education:  Go to Education Activity  Goal: Patient/Family Education  Outcome: Progressing Towards Goal

## 2022-06-28 NOTE — PROGRESS NOTES
Problem: Mobility Impaired (Adult and Pediatric)  Goal: *Acute Goals and Plan of Care (Insert Text)  Description: FUNCTIONAL STATUS PRIOR TO ADMISSION: Patient was independent and active without use of DME.    HOME SUPPORT PRIOR TO ADMISSION: The patient lived with son but did not require assist.    Physical Therapy Goals  Initiated 6/25/2022  1. Patient will move from supine to sit and sit to supine  in bed with minimal assistance/contact guard assist within 7 day(s). 2.  Patient will transfer from bed to chair and chair to bed with minimal assistance/contact guard assist using the least restrictive device within 7 day(s). 3.  Patient will perform sit to stand with supervision/set-up within 7 day(s). 4.  Patient will ambulate with supervision/set-up for 75 feet with the least restrictive device within 7 day(s). 5.  Patient will ascend/descend 5 stairs with unilateral handrail(s) with minimal assistance/contact guard assist within 7 day(s). 6/28/2022 1512 by Kalyani Damian, PT  Outcome: Progressing Towards Goal  6/28/2022 1235 by Kalyani Damian, PT  Outcome: Progressing Towards Goal   PHYSICAL THERAPY TREATMENT  Patient: Karlie Heredia (81 y.o. female)  Date: 6/28/2022  Diagnosis: Femur fracture (HCC) [S72.90XA] Femur fracture ([address]  Procedure(s) (LRB):  HIP PERCUTANEOUS PINNING CANNULATED SCREWS (Left) 4 Days Post-Op  Precautions: Fall,WBAT  Chart, physical therapy assessment, plan of care and goals were reviewed. ASSESSMENT  Patient continues with skilled PT services and is progressing towards goals. Patient demonstrates improved retention of HEP taught in am session as well as hand placement with transfers. Still needs reviewing and occasional prompts for gait sequencing and proper advancement of RW, but no LOB. Noted improved floor clearance with LLE and patient able to self correct times when she slid L foot. Improved gait distance today, cont to recommend IPR.      Current Level of Function Impacting Discharge (mobility/balance): CGA to MIN A, gait x 80'    Other factors to consider for discharge: very active PTA     PLAN :  Patient continues to benefit from skilled intervention to address the above impairments. Continue treatment per established plan of care. to address goals. Recommendation for discharge: (in order for the patient to meet his/her long term goals)  Therapy 3 hours per day 5-7 days per week    This discharge recommendation:  Has been made in collaboration with the attending provider and/or case management    IF patient discharges home will need the following DME: to be determined (TBD)       SUBJECTIVE:   Patient stated I feel better, I think I've turned a corner.     OBJECTIVE DATA SUMMARY:   Critical Behavior:  Neurologic State: Alert  Orientation Level: Oriented X4  Cognition: Appropriate decision making  Safety/Judgement: Awareness of environment,Decreased awareness of need for assistance,Decreased awareness of need for safety,Decreased insight into deficits,Fall prevention,Home safety  Functional Mobility Training:  Bed Mobility:     Supine to Sit:  (received up in chair)              Transfers:  Sit to Stand: Minimum assistance  Stand to Sit: Minimum assistance                             Balance:  Sitting: Intact; With support  Sitting - Static: Good (unsupported)  Sitting - Dynamic: Good (unsupported)  Standing: Impaired  Standing - Static: Good;Constant support  Standing - Dynamic : Fair;Constant support  Ambulation/Gait Training:  Distance (ft): 70 Feet (ft)  Assistive Device: Gait belt;Walker, rolling (no AFO 2/2 not staying in shoe, slip ons)  Ambulation - Level of Assistance: Contact guard assistance;Minimal assistance        Gait Abnormalities: Decreased step clearance;Trunk sway increased        Base of Support: Narrowed     Speed/Rima: Pace decreased (<100 feet/min)  Step Length: Right shortened;Left shortened                      Pain Ratin-3/10 in L hip    Activity Tolerance:   Fair and requires rest breaks    After treatment patient left in no apparent distress:   Sitting in chair, Call bell within reach, and Bed / chair alarm activated    COMMUNICATION/COLLABORATION:   The patients plan of care was discussed with: Registered nurse.      Kala Hamlin, PT   Time Calculation: 17 mins

## 2022-06-28 NOTE — PROGRESS NOTES
Bedside shift change report given to Randa Jimenez RN  (oncoming nurse) by Izaiah Alvarez  (offgoing nurse). Report included the following information SBAR, Kardex, MAR and Recent Results.

## 2022-06-28 NOTE — PROGRESS NOTES
Problem: Self Care Deficits Care Plan (Adult)  Goal: *Acute Goals and Plan of Care (Insert Text)  Description: FUNCTIONAL STATUS PRIOR TO ADMISSION: Patient was independent and active without use of DME. Patient with hx of right foot drop, sx on right foot/LE in 2010, had sx on her left eye 3/30/22 and with continued blurry vision and at times double vision and nausea, has not mowed lawn in 2 years however performed a few weeks ago. Reports many near falls/catches herself since vision sx, takes time and moves slow at baseline due to foot drop    HOME SUPPORT: The patient lived with her son who works but did not require assist..      Occupational Therapy Goals  Initiated 6/25/2022  1. Patient will perform lower body ADLs with AE minimal assistance/contact guard assist within 7 day(s). 2.  Patient will perform upper body ADLs standing 5 mins without fatigue or LOB with minimal assistance/contact guard assist within 7 day(s). 3.  Patient will perform toilet transfer with minimal assistance/contact guard assist within 7 day(s). 4.  Patient will perform all aspects of toileting with minimal assistance/contact guard assist within 7 day(s). 5.  Patient will demonstrate good safety awareness during ADL tasks and mobility within 7 day(s). Outcome: Progressing Towards Goal   OCCUPATIONAL THERAPY TREATMENT  Patient: Solo Ortega (51 y.o. female)  Date: 6/28/2022  Diagnosis: Femur fracture ([facility]) [S72.90XA] Femur fracture ([address]  Procedure(s) (LRB):  HIP PERCUTANEOUS PINNING CANNULATED SCREWS (Left) 4 Days Post-Op  Precautions: Fall,WBAT  Chart, occupational therapy assessment, plan of care, and goals were reviewed. ASSESSMENT  Patient continues with skilled OT services and is progressing towards goals.  Pt seen for Adl re-training, ambulated to restroom to transfer to [facility] over commode min assist. Asked whether she would like to stand at sink to wash her hands,brush teeth however pt stated she \"did not want to press her luck\" as far as pain concerned. Pt needing min verbal cueing for safety. Pt returned to sit in chair, PT present. Current Level of Function Impacting Discharge (ADLs): Min assist to transfer to [facility] over commode    Other factors to consider for discharge:          PLAN :  Patient continues to benefit from skilled intervention to address the above impairments. Continue treatment per established plan of care to address goals. Recommend with staff: out off bed for ADLs, there ex, there act, meals    Recommend next OT session: cont towards goals    Recommendation for discharge: (in order for the patient to meet his/her long term goals)  Therapy 3 hours per day 5-7 days per week    This discharge recommendation:  Has not yet been discussed the attending provider and/or case management    IF patient discharges home will need the following DME:        SUBJECTIVE:   Patient stated Arabella Perez are so may things to remember.     OBJECTIVE DATA SUMMARY:   Cognitive/Behavioral Status:  Neurologic State: Alert  Orientation Level: Oriented X4  Cognition: Appropriate decision making             Functional Mobility and Transfers for ADLs:  Bed Mobility:  Supine to Sit:  (received up in chair)    Transfers:  Sit to Stand: Minimum assistance  Functional Transfers  Toilet Transfer : Minimum assistance  Adaptive Equipment: Bedside commode       Balance:  Sitting: Intact; With support  Sitting - Static: Good (unsupported)  Sitting - Dynamic: Fair (occasional)  Standing: Impaired  Standing - Static: Good;Constant support  Standing - Dynamic : Fair;Constant support    ADL Intervention:       Grooming  Brushing/Combing Hair: Set-up         Activity Tolerance:   Fair    After treatment patient left in no apparent distress:   Sitting in chair and Call bell within reach    COMMUNICATION/COLLABORATION:   The patients plan of care was discussed with: Physical therapist, Occupational therapist, and Registered nurse.      Zachery Rapp MILES/L  Time Calculation: 20 mins

## 2022-06-28 NOTE — PROGRESS NOTES
Problem: Mobility Impaired (Adult and Pediatric)  Goal: *Acute Goals and Plan of Care (Insert Text)  Description: FUNCTIONAL STATUS PRIOR TO ADMISSION: Patient was independent and active without use of DME.    HOME SUPPORT PRIOR TO ADMISSION: The patient lived with son but did not require assist.    Physical Therapy Goals  Initiated 6/25/2022  1. Patient will move from supine to sit and sit to supine  in bed with minimal assistance/contact guard assist within 7 day(s). 2.  Patient will transfer from bed to chair and chair to bed with minimal assistance/contact guard assist using the least restrictive device within 7 day(s). 3.  Patient will perform sit to stand with supervision/set-up within 7 day(s). 4.  Patient will ambulate with supervision/set-up for 75 feet with the least restrictive device within 7 day(s). 5.  Patient will ascend/descend 5 stairs with unilateral handrail(s) with minimal assistance/contact guard assist within 7 day(s). Outcome: Progressing Towards Goal   PHYSICAL THERAPY TREATMENT  Patient: Abigail Cast (89 y.o. female)  Date: 6/28/2022  Diagnosis: Femur fracture (HCC) [S72.90XA] Femur fracture (HCC)  Procedure(s) (LRB):  HIP PERCUTANEOUS PINNING CANNULATED SCREWS (Left) 4 Days Post-Op  Precautions: Fall,WBAT  Chart, physical therapy assessment, plan of care and goals were reviewed. ASSESSMENT  Patient continues with skilled PT services and is progressing towards goals. Patient demonstrates improved gait and activity tolerance today but still requires frequent cues for sequencing, hand placement and advancing LLE without sliding on the floor. Patient displays anxious behavior with mobility tasks, often completing impulsively but other times talking through every minute step prior to initiating. Patient declining to try AFO on R today 2/2 slip on sketchers not holding well in place, exhibits sufficient hip flex to compensate for foot drop.   Improved weight acceptance on L hip as session progressed and with cueing for midline position during gait. Instructed patient in seated LE therex HEP and left handout. Cont to recommend IPR, patient highly motivated, able to handle intense rehab and has excellent potential to regain full functional level. Current Level of Function Impacting Discharge (mobility/balance): CGA to MIN A, gait to/from door    Other factors to consider for discharge: well below baseline, fall risk         PLAN :  Patient continues to benefit from skilled intervention to address the above impairments. Continue treatment per established plan of care. to address goals. Recommendation for discharge: (in order for the patient to meet his/her long term goals)  Therapy 3 hours per day 5-7 days per week    This discharge recommendation:  Has been made in collaboration with the attending provider and/or case management    IF patient discharges home will need the following DME: to be determined (TBD)       SUBJECTIVE:   Patient stated I'm used to these shoes and I walk best in them (daughter reports wearing them when she fell)    OBJECTIVE DATA SUMMARY:   Critical Behavior:  Neurologic State: Alert  Orientation Level: Oriented X4  Cognition: Appropriate decision making  Safety/Judgement: Awareness of environment,Decreased awareness of need for assistance,Decreased awareness of need for safety,Decreased insight into deficits,Fall prevention,Home safety  Functional Mobility Training:  Bed Mobility:     Supine to Sit:  (received up in chair)              Transfers:  Sit to Stand: Minimum assistance  Stand to Sit: Minimum assistance                             Balance:  Sitting: Intact; With support  Sitting - Static: Good (unsupported)  Sitting - Dynamic: Fair (occasional)  Standing: Impaired  Standing - Static: Good;Constant support  Standing - Dynamic : Fair;Constant support  Ambulation/Gait Training:  Distance (ft): 30 Feet (ft)  Assistive Device: Gait belt;Walker, rolling (no AFO 2/2 not staying in shoe, slip ons)  Ambulation - Level of Assistance: Contact guard assistance;Minimal assistance        Gait Abnormalities: Decreased step clearance;Trunk sway increased        Base of Support: Narrowed     Speed/Rima: Pace decreased (<100 feet/min)  Step Length: Right shortened;Left shortened               Therapeutic Exercises:   Seated LE therex: hip flex, LAQs, heel raises, hip add with pillow  Pain Rating:  3-5/10 in L hip and knee    Activity Tolerance:   Fair    After treatment patient left in no apparent distress:   Sitting in chair, Call bell within reach, Bed / chair alarm activated, and Caregiver / family present    COMMUNICATION/COLLABORATION:   The patients plan of care was discussed with: Registered nurse.      Daryn Miller, PT   Time Calculation: 26 mins

## 2022-06-28 NOTE — PROGRESS NOTES
Iraj Handy sarah [place] 77  [address]  [phone] [address] Adult  Hospitalist Group                                                                                          Hospitalist Progress Note  Jose Alejandro Leach MD        Date of Service:  2022  NAME:  Lamberto Delong  :  1950  MRN:  [id number]      Admission Summary:   58-year-old female presented with a left hip fracture after a fall at home    Interval history / Subjective:   Complains of pain in her left hip, but improved. 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 -chronic   · Continue supplementation with levothyroxine     Hx of CVA in the past -  · Continue supportive care      Osteoporosis  -pt received last injection about a year ago by her endcrinologist   · Add VIt D and Ca+ supplementation      Hx of PAD -s/p Right Sup Fem artery PCI   · Continue Plavix as is     Code status: Full  DVT prophylaxis: [facility] Problems  Date Reviewed: 2022          Codes Class Noted POA    * (Principal) Femur fracture ([facility] [address] ICD-10-CM: U16.33ZJ  ICD-9-CM: 821.00  2022 Yes        Thyroid disease ICD-10-CM: E07.9  ICD-9-CM: 246. 9  Unknown Yes        Ill-defined condition ICD-10-CM: R69  ICD-9-CM: 799.89  Unknown Yes    Overview Signed 2022  1:05 PM by Ada Braga MD     osteoporosis             CAD (coronary artery disease) ICD-10-CM: I25.10  ICD-9-CM: 414.00  Unknown Yes    Overview Signed 2022  1:05 PM by Ada Braga MD     heart attack/stents             History of CVA (cerebrovascular accident) ICD-10-CM: Z86.73  ICD-9-CM: V12.54  6/24/2022 Yes    Overview Addendum 6/24/2022  1:07 PM by Jose Luis Carter MD     Noted on MRI in 11/2021 => chronic infarct L centrum semiovale                     Review of Systems:   A comprehensive review of systems was negative except for that written in the HPI. Vital Signs:    Last 24hrs VS reviewed since prior progress note. Most recent are:  Visit Vitals  /78 (BP 1 Location: Right upper arm, BP Patient Position: At rest;Sitting)   Pulse (!) 58   Temp 98 °F (36.7 °C)   Resp 16   Ht 5' 2\" (1.575 m)   Wt 61.6 kg (135 lb 12.9 oz)   SpO2 96%   BMI 24.84 kg/m²       No intake or output data in the 24 hours ending 06/28/22 1420     Physical Examination:             Constitutional:  No acute distress, cooperative, pleasant    ENT:  Oral mucosa moist, oropharynx benign. Resp:  CTA bilaterally. No wheezing/rhonchi/rales. No accessory muscle use   CV:  Regular rhythm, normal rate, no murmurs, gallops, rubs    GI:  Soft, non distended, non tender. normoactive bowel sounds, no hepatosplenomegaly     Musculoskeletal:  No edema, warm, 2+ pulses throughout    Neurologic:  Moves all extremities. AAOx3, CN II-XII reviewed     Psych:  Good insight, Not anxious nor agitated. Data Review:    Review and/or order of clinical lab test      Labs:     Recent Labs     06/28/22  0456 06/26/22  0415   WBC 6.3  --    HGB 11.5 11.5   HCT 36.2  --      --      No results for input(s): NA, K, CL, CO2, BUN, CREA, GLU, CA, MG, PHOS, URICA in the last 72 hours. No results for input(s): ALT, AP, TBIL, TBILI, TP, ALB, GLOB, GGT, AML, LPSE in the last 72 hours. No lab exists for component: SGOT, GPT, AMYP, HLPSE  No results for input(s): INR, PTP, APTT, INREXT, INREXT in the last 72 hours. No results for input(s): FE, TIBC, PSAT, FERR in the last 72 hours.    Lab Results   Component Value Date/Time    Folate 9.3 11/29/2021 02:00 AM      No results for input(s): PH, PCO2, PO2 in the last 72 hours. No results for input(s): CPK, CKNDX, TROIQ in the last 72 hours.     No lab exists for component: CPKMB  Lab Results   Component Value Date/Time    Cholesterol, total 161 11/29/2021 02:00 AM    HDL Cholesterol 60 11/29/2021 02:00 AM    LDL, calculated 80.4 11/29/2021 02:00 AM    Triglyceride 103 11/29/2021 02:00 AM    CHOL/HDL Ratio 2.7 11/29/2021 02:00 AM     Lab Results   Component Value Date/Time    Glucose (POC) 88 11/28/2021 03:36 PM     Lab Results   Component Value Date/Time    Color YELLOW/STRAW 06/24/2022 06:40 PM    Appearance CLEAR 06/24/2022 06:40 PM    Specific gravity 1.015 06/24/2022 06:40 PM    Specific gravity 1.016 11/28/2021 04:48 PM    pH (UA) 6.5 06/24/2022 06:40 PM    Protein Negative 06/24/2022 06:40 PM    Glucose Negative 06/24/2022 06:40 PM    Ketone Negative 06/24/2022 06:40 PM    Bilirubin Negative 06/24/2022 06:40 PM    Urobilinogen 0.2 06/24/2022 06:40 PM    Nitrites Negative 06/24/2022 06:40 PM    Leukocyte Esterase Negative 06/24/2022 06:40 PM    Epithelial cells FEW 06/24/2022 06:40 PM    Bacteria Negative 06/24/2022 06:40 PM    WBC 0-4 06/24/2022 06:40 PM    RBC 0-5 06/24/2022 06:40 PM         Medications Reviewed:     Current Facility-Administered Medications   Medication Dose Route Frequency    diclofenac (VOLTAREN) 1 % topical gel 2 g  2 g Topical QID    metoprolol tartrate (LOPRESSOR) tablet 25 mg  25 mg Oral Q12H    sodium chloride (NS) flush 5-40 mL  5-40 mL IntraVENous Q8H    sodium chloride (NS) flush 5-40 mL  5-40 mL IntraVENous PRN    acetaminophen (TYLENOL) tablet 650 mg  650 mg Oral Q6H    oxyCODONE IR (ROXICODONE) tablet 2.5 mg  2.5 mg Oral Q4H PRN    oxyCODONE IR (ROXICODONE) tablet 5 mg  5 mg Oral Q4H PRN    fentaNYL citrate (PF) injection 25 mcg  25 mcg IntraVENous Q4H PRN    sodium chloride (NS) flush 5-40 mL  5-40 mL IntraVENous Q8H    sodium chloride (NS) flush 5-40 mL  5-40 mL IntraVENous PRN    azelastine (ASTELIN) 137mcg/spray nasal spray  1 Spray Both Nostrils BID PRN    ezetimibe (ZETIA) tablet 10 mg  10 mg Oral DAILY    levothyroxine (SYNTHROID) tablet 50 mcg  50 mcg Oral ACB    rosuvastatin (CRESTOR) tablet 10 mg  10 mg Oral QHS    vitamin L-A-G-lutein-minerals (OCUVITE) tablet 1 Tablet  1 Tablet Oral DAILY    hydrALAZINE (APRESOLINE) 20 mg/mL injection 10 mg  10 mg IntraVENous Q6H PRN    aspirin chewable tablet 81 mg  81 mg Oral DAILY    sodium chloride (NS) flush 5-40 mL  5-40 mL IntraVENous Q8H    sodium chloride (NS) flush 5-40 mL  5-40 mL IntraVENous PRN    naloxone (NARCAN) injection 0.4 mg  0.4 mg IntraVENous PRN    calcium-vitamin D (OS-ANTWAN +D3) 500 mg-200 unit per tablet 1 Tablet  1 Tablet Oral TID WITH MEALS    senna-docusate (PERICOLACE) 8.6-50 mg per tablet 1 Tablet  1 Tablet Oral BID    polyethylene glycol (MIRALAX) packet 17 g  17 g Oral DAILY    bisacodyL (DULCOLAX) suppository 10 mg  10 mg Rectal DAILY PRN    enoxaparin (LOVENOX) injection 40 mg  40 mg SubCUTAneous DAILY     ______________________________________________________________________  EXPECTED LENGTH OF STAY: 2d 19h  ACTUAL LENGTH OF STAY:          4                 Salima Hubbard MD

## 2022-06-28 NOTE — PROGRESS NOTES
6/28/2022  12:57 PM  Care Management Progress Note      ICD-10-CM ICD-9-CM    1. Other closed fracture of head or neck of left femur, initial encounter ([facility] [address]  S72.092A 820.09    2. Coronary artery disease involving native coronary artery of native heart without angina pectoris  I25.10 414.01    3. Pre-operative cardiovascular examination  Z01.810 V72.81        RUR:  6%  Risk Level: [x]Low []Moderate []High  Value-based purchasing: [] Yes [x] No  Bundle patient: [] Yes [x] No   Specify:     Transition of care plan:  1. Discharge pending placement. Ortho following. PT/OT treating. 2. [facility] - CM notified [facility] is able to accept, and expects to have a bed available on 6/29/22.  3. Outpatient follow-up. 4. [facility] stretcher transport on will call.

## 2022-06-29 VITALS
SYSTOLIC BLOOD PRESSURE: 136 MMHG | OXYGEN SATURATION: 96 % | DIASTOLIC BLOOD PRESSURE: 63 MMHG | WEIGHT: 154.98 LBS | TEMPERATURE: 98.2 F | HEART RATE: 88 BPM | HEIGHT: 62 IN | RESPIRATION RATE: 17 BRPM | BODY MASS INDEX: 28.52 KG/M2

## 2022-06-29 PROCEDURE — 74011250637 HC RX REV CODE- 250/637: Performed by: FAMILY MEDICINE

## 2022-06-29 PROCEDURE — 94761 N-INVAS EAR/PLS OXIMETRY MLT: CPT

## 2022-06-29 PROCEDURE — 97116 GAIT TRAINING THERAPY: CPT

## 2022-06-29 PROCEDURE — 74011000250 HC RX REV CODE- 250: Performed by: STUDENT IN AN ORGANIZED HEALTH CARE EDUCATION/TRAINING PROGRAM

## 2022-06-29 PROCEDURE — 74011250637 HC RX REV CODE- 250/637: Performed by: STUDENT IN AN ORGANIZED HEALTH CARE EDUCATION/TRAINING PROGRAM

## 2022-06-29 PROCEDURE — 74011250636 HC RX REV CODE- 250/636: Performed by: STUDENT IN AN ORGANIZED HEALTH CARE EDUCATION/TRAINING PROGRAM

## 2022-06-29 PROCEDURE — 97535 SELF CARE MNGMENT TRAINING: CPT

## 2022-06-29 PROCEDURE — 74011000250 HC RX REV CODE- 250: Performed by: INTERNAL MEDICINE

## 2022-06-29 PROCEDURE — 74011250637 HC RX REV CODE- 250/637: Performed by: INTERNAL MEDICINE

## 2022-06-29 PROCEDURE — 74011250637 HC RX REV CODE- 250/637: Performed by: NURSE PRACTITIONER

## 2022-06-29 RX ORDER — ENOXAPARIN SODIUM 100 MG/ML
40 INJECTION SUBCUTANEOUS DAILY
Qty: 30 EACH | Refills: 0 | Status: SHIPPED
Start: 2022-06-30 | End: 2022-07-30

## 2022-06-29 RX ORDER — POLYETHYLENE GLYCOL 3350 17 G/17G
17 POWDER, FOR SOLUTION ORAL DAILY
Qty: 30 PACKET | Refills: 0 | Status: SHIPPED
Start: 2022-06-30

## 2022-06-29 RX ORDER — AMOXICILLIN 250 MG
1 CAPSULE ORAL 2 TIMES DAILY
Qty: 30 TABLET | Refills: 0 | Status: SHIPPED
Start: 2022-06-29

## 2022-06-29 RX ORDER — OXYCODONE HYDROCHLORIDE 5 MG/1
5 TABLET ORAL
Qty: 15 TABLET | Refills: 0 | Status: SHIPPED | OUTPATIENT
Start: 2022-06-29 | End: 2022-07-02

## 2022-06-29 RX ORDER — FACIAL-BODY WIPES
10 EACH TOPICAL
Qty: 10 EACH | Refills: 0 | Status: SHIPPED
Start: 2022-06-29

## 2022-06-29 RX ORDER — DICLOFENAC SODIUM 10 MG/G
2 GEL TOPICAL 4 TIMES DAILY
Qty: 1 G | Refills: 0 | Status: SHIPPED
Start: 2022-06-29

## 2022-06-29 RX ORDER — FERROUS SULFATE, DRIED 160(50) MG
1 TABLET, EXTENDED RELEASE ORAL
Qty: 30 TABLET | Refills: 0 | Status: SHIPPED
Start: 2022-06-29

## 2022-06-29 RX ADMIN — Medication 10 ML: at 07:05

## 2022-06-29 RX ADMIN — POLYETHYLENE GLYCOL 3350 17 G: 17 POWDER, FOR SOLUTION ORAL at 09:49

## 2022-06-29 RX ADMIN — ACETAMINOPHEN 650 MG: 325 TABLET ORAL at 07:05

## 2022-06-29 RX ADMIN — Medication 1 TABLET: at 09:49

## 2022-06-29 RX ADMIN — METOPROLOL TARTRATE 25 MG: 25 TABLET, FILM COATED ORAL at 09:50

## 2022-06-29 RX ADMIN — DICLOFENAC SODIUM 2 G: 10 GEL TOPICAL at 12:27

## 2022-06-29 RX ADMIN — ACETAMINOPHEN 650 MG: 325 TABLET ORAL at 12:50

## 2022-06-29 RX ADMIN — ASPIRIN 81 MG: 81 TABLET, CHEWABLE ORAL at 09:49

## 2022-06-29 RX ADMIN — DICLOFENAC SODIUM 2 G: 10 GEL TOPICAL at 09:49

## 2022-06-29 RX ADMIN — SODIUM CHLORIDE, PRESERVATIVE FREE 10 ML: 5 INJECTION INTRAVENOUS at 07:06

## 2022-06-29 RX ADMIN — DOCUSATE SODIUM 50MG AND SENNOSIDES 8.6MG 1 TABLET: 8.6; 5 TABLET, FILM COATED ORAL at 09:49

## 2022-06-29 RX ADMIN — ACETAMINOPHEN 650 MG: 325 TABLET ORAL at 00:46

## 2022-06-29 RX ADMIN — EZETIMIBE 10 MG: 10 TABLET ORAL at 09:49

## 2022-06-29 RX ADMIN — ENOXAPARIN SODIUM 40 MG: 100 INJECTION SUBCUTANEOUS at 09:49

## 2022-06-29 RX ADMIN — LEVOTHYROXINE SODIUM 50 MCG: 0.05 TABLET ORAL at 07:05

## 2022-06-29 RX ADMIN — OXYCODONE 5 MG: 5 TABLET ORAL at 09:49

## 2022-06-29 NOTE — PROGRESS NOTES
Discussed discharge instructions and summary with patient. Patient verbalized understanding of instructions and medications. Handed patient prescription scripts. Patient signed discharge paperwork via e-sign. Opportunity for questions/clarification provided. VSS. IV removed with no complication. Pt took all belongings with them.

## 2022-06-29 NOTE — PROGRESS NOTES
6/29/2022  12:57 PM  Care Management Progress Note      ICD-10-CM ICD-9-CM    1. Other closed fracture of head or neck of left femur, initial encounter ([facility])  S72.092A 820.09 oxyCODONE IR (ROXICODONE) 5 mg immediate release tablet   2. Coronary artery disease involving native coronary artery of native heart without angina pectoris  I25.10 414.01    3. Pre-operative cardiovascular examination  Z01.810 V72.81        RUR:  6%  Risk Level: [x]Low []Moderate []High  Value-based purchasing: [] Yes [x] No  Bundle patient: [] Yes [x] No   Specify:     Transition of care plan:  1. Discharge order submitted. Pt has medically cleared. 2. [facility]PR - [facility] accepted and has a bed available today. Nursing, please call report to , Dr Ama Lamb accepting physician. 3. Outpatient follow-up. 4. [facility] stretcher transport arranged for 2pm. Packet on chart, and PCS form attached in AllScripts. Care Management Interventions  PCP Verified by CM:  Yes  Mode of Transport at Discharge: BLS  Transition of Care Consult (CM Consult): Discharge Planning  Physical Therapy Consult: Yes  Occupational Therapy Consult: Yes  Support Systems: Child(mayi)  Confirm Follow Up Transport: Other (see comment) (stretcher transport anticipated. )  The Patient and/or Patient Representative was Provided with a Choice of Provider and Agrees with the Discharge Plan?: Yes  Freedom of Choice List was Provided with Basic Dialogue that Supports the Patient's Individualized Plan of Care/Goals, Treatment Preferences and Shares the Quality Data Associated with the Providers?: Yes  Discharge Location  Patient Expects to be Discharged to[de-identified] Rehab hospital/unit acute

## 2022-06-29 NOTE — PROGRESS NOTES
Cleared for DC from orthopedic standpoint. DC instructions in chart.       Mike Aviles PA-C  Orthopaedic Surgery PA  [address] [facility]

## 2022-06-29 NOTE — PROGRESS NOTES
Problem: Self Care Deficits Care Plan (Adult)  Goal: *Acute Goals and Plan of Care (Insert Text)  Description: FUNCTIONAL STATUS PRIOR TO ADMISSION: Patient was independent and active without use of DME. Patient with hx of right foot drop, sx on right foot/LE in 2010, had sx on her left eye 3/30/22 and with continued blurry vision and at times double vision and nausea, has not mowed lawn in 2 years however performed a few weeks ago. Reports many near falls/catches herself since vision sx, takes time and moves slow at baseline due to foot drop    HOME SUPPORT: The patient lived with her son who works but did not require assist..      Occupational Therapy Goals  Initiated 6/25/2022  1. Patient will perform lower body ADLs with AE minimal assistance/contact guard assist within 7 day(s). 2.  Patient will perform upper body ADLs standing 5 mins without fatigue or LOB with minimal assistance/contact guard assist within 7 day(s). 3.  Patient will perform toilet transfer with minimal assistance/contact guard assist within 7 day(s). 4.  Patient will perform all aspects of toileting with minimal assistance/contact guard assist within 7 day(s). 5.  Patient will demonstrate good safety awareness during ADL tasks and mobility within 7 day(s). Outcome: Progressing Towards Goal   OCCUPATIONAL THERAPY TREATMENT  Patient: Karlie Heredia (76 y.o. female)  Date: 6/29/2022  Diagnosis: Femur fracture (HCC) [S72.90XA] Femur fracture ([address]  Procedure(s) (LRB):  HIP PERCUTANEOUS PINNING CANNULATED SCREWS (Left) 5 Days Post-Op  Precautions: Fall,WBAT  Chart, occupational therapy assessment, plan of care, and goals were reviewed. ASSESSMENT  Patient continues with skilled OT services and is progressing towards goals. Pt seen for ADL re-training, present in restroom with daughter assisting with toileting and following BM. She needed min/mod assist to manage pants and verbal cueing for safety during task.  Pt stood at sink to wash her hands and brush her teeth, contact guard. Pt ambulated back to sit in chair and used sock aide and long handle shoe horn min assist. Pt plans to go to rehab today. Current Level of Function Impacting Discharge (ADLs): min assist socks and shoes with AE, contact guard toileting    Other factors to consider for discharge:          PLAN :  Patient continues to benefit from skilled intervention to address the above impairments. Continue treatment per established plan of care to address goals. Recommend with staff: out of bed for ADL's, there ex, there act, meals    Recommend next OT session: cont towards goals    Recommendation for discharge: (in order for the patient to meet his/her long term goals)  Therapy 3 hours per day 5-7 days per week    This discharge recommendation:  Has not yet been discussed the attending provider and/or case management    IF patient discharges home will need the following DME:        SUBJECTIVE:   Patient stated [place] you for reminding me.     OBJECTIVE DATA SUMMARY:   Cognitive/Behavioral Status:  Neurologic State: Alert  Orientation Level: Oriented X4  Cognition: Follows commands; Appropriate safety awareness; Appropriate for age attention/concentration; Appropriate decision making             Functional Mobility and Transfers for ADLs:  Bed Mobility:   Not tested as pt already up in chair    Transfers:     Functional Transfers  Toilet Transfer : Minimum assistance       Balance:   Intact sitting balance    ADL Intervention:       Grooming  Grooming Assistance: Contact guard assistance  Washing Hands: Contact guard assistance  Brushing Teeth: Contact guard assistance                        Lower Body Dressing Assistance  Dressing Assistance: Moderate assistance  Socks: Minimum assistance  Slip on Shoes with Back: Moderate assistance  Cues: Don;Physical assistance  Adaptive Equipment Used: Long handled shoe horn;Sock aid    Toileting  Bowel Hygiene:  Moderate assistance  Clothing Management: Moderate assistance         Activity Tolerance:   Fair    After treatment patient left in no apparent distress:   Sitting in chair    COMMUNICATION/COLLABORATION:   The patients plan of care was discussed with: Physical therapist, Occupational therapist, and Registered nurse.      JD Christensen/L  Time Calculation: 37 mins

## 2022-06-29 NOTE — PROGRESS NOTES
Problem: Mobility Impaired (Adult and Pediatric)  Goal: *Acute Goals and Plan of Care (Insert Text)  Description: FUNCTIONAL STATUS PRIOR TO ADMISSION: Patient was independent and active without use of DME.    HOME SUPPORT PRIOR TO ADMISSION: The patient lived with son but did not require assist.    Physical Therapy Goals  Initiated 6/25/2022  1. Patient will move from supine to sit and sit to supine  in bed with minimal assistance/contact guard assist within 7 day(s). 2.  Patient will transfer from bed to chair and chair to bed with minimal assistance/contact guard assist using the least restrictive device within 7 day(s). 3.  Patient will perform sit to stand with supervision/set-up within 7 day(s). 4.  Patient will ambulate with supervision/set-up for 75 feet with the least restrictive device within 7 day(s). 5.  Patient will ascend/descend 5 stairs with unilateral handrail(s) with minimal assistance/contact guard assist within 7 day(s). Outcome: Progressing Towards Goal   PHYSICAL THERAPY TREATMENT  Patient: Russell Lim (60 y.o. female)  Date: 6/29/2022  Diagnosis: Femur fracture (HCC) [S72.90XA] Femur fracture (HCC)  Procedure(s) (LRB):  HIP PERCUTANEOUS PINNING CANNULATED SCREWS (Left) 5 Days Post-Op  Precautions: Fall,WBAT  Chart, physical therapy assessment, plan of care and goals were reviewed. ASSESSMENT  Patient continues with skilled PT services and is progressing towards goals. Patient received with PCT needing to go to bathroom. Patient exhibits improved sequencing and step length today with better management of RW. Still exhibiting slowed dahlia and need to talk through each step of mobility prior to initiating but improved speed of this process. Deferred longer distance gait due to fatigue. Cont to recommend IPR, unable to d/c home alone.      Current Level of Function Impacting Discharge (mobility/balance): CGA to MIN A, gait 40-80'    Other factors to consider for discharge: fall risk         PLAN :  Patient continues to benefit from skilled intervention to address the above impairments. Continue treatment per established plan of care. to address goals. Recommendation for discharge: (in order for the patient to meet his/her long term goals)  Therapy 3 hours per day 5-7 days per week    This discharge recommendation:  Has been made in collaboration with the attending provider and/or case management    IF patient discharges home will need the following DME: to be determined (TBD)       SUBJECTIVE:   Patient stated I just need to get myself situated this morning.     OBJECTIVE DATA SUMMARY:   Critical Behavior:  Neurologic State: Alert  Orientation Level: Oriented X4  Cognition: Follows commands,Appropriate safety awareness,Appropriate for age attention/concentration,Appropriate decision making  Safety/Judgement: Awareness of environment,Decreased awareness of need for assistance,Decreased awareness of need for safety,Decreased insight into deficits,Fall prevention,Home safety  Functional Mobility Training:  Bed Mobility:     Supine to Sit:  (received EOB)              Transfers:  Sit to Stand: Contact guard assistance  Stand to Sit: Contact guard assistance                             Balance:  Sitting: Intact  Standing: Intact; With support  Standing - Static: Good;Constant support  Standing - Dynamic : Fair;Good;Constant support  Ambulation/Gait Training:  Distance (ft): 40 Feet (ft)  Assistive Device: Gait belt;Walker, rolling  Ambulation - Level of Assistance: Contact guard assistance        Gait Abnormalities: Decreased step clearance        Base of Support: Narrowed     Speed/Rima: Pace decreased (<100 feet/min)  Step Length: Right shortened;Left shortened                Pain Ratin-5/10 in hip    Activity Tolerance:   Fair and requires rest breaks    After treatment patient left in no apparent distress:   Sitting in chair, Call bell within reach, and Bed / chair alarm activated    COMMUNICATION/COLLABORATION:   The patients plan of care was discussed with: Registered nurse.      Meagan Dominguez PT   Time Calculation: 17 mins

## 2022-06-29 NOTE — PROGRESS NOTES
Problem: Falls - Risk of  Goal: *Absence of Falls  Description: Document Rosmery Werner Fall Risk and appropriate interventions in the flowsheet.   Outcome: Progressing Towards Goal  Note: Fall Risk Interventions:  Mobility Interventions: Bed/chair exit alarm         Medication Interventions: Bed/chair exit alarm    Elimination Interventions: Bed/chair exit alarm,Call light in reach    History of Falls Interventions: Bed/chair exit alarm         Problem: Patient Education: Go to Patient Education Activity  Goal: Patient/Family Education  Outcome: Progressing Towards Goal     Problem: Patient Education: Go to Patient Education Activity  Goal: Patient/Family Education  Outcome: Progressing Towards Goal     Problem: Pain  Goal: *Control of Pain  Outcome: Progressing Towards Goal

## 2022-06-29 NOTE — DISCHARGE INSTRUCTIONS
Fractured Hip Discharge Instructions      Christiane Carter  Date of Procedure: 6/24/2022  Procedure: Procedure(s):  HIP PERCUTANEOUS PINNING CANNULATED SCREWS  Surgeon(s) and Role:     Alfa Mercedes MD - Primary    Diet:  Provide balanced diet including whole grains, vegetables, fruit, low-fat dairy and lean meats; include foods rich in calcium and vitamin D. Encourage fluids. Activities:   Physical therapy daily-exercise, gait training, ambulation with a walker, weight bearing as tolerated  Partial hip replacements (bipolar, hemiarthroplasty)-routine hip precautions x 6 weeks. Avoid sitting in low chairs    Anticoagulation:  Lovenox 40mg sub-q once daily     Wound Care:  Change dressing daily x one week then leave open to air. If drainage has not stopped, reapply dressing and notify surgeon. Staples are to be removed two weeks after surgery     Follow-up: Instruct patient/family to arrange for a follow up visit with surgeon 4 weeks after surgery and primary care physician. Notify surgeon for any of the following:  Fever over 101 degrees for 24 hours. Severe pain or pain not relieved by medications. Redness, swelling, or drainage from your incision. Persistent pain in the chest or calf of leg.

## 2022-06-29 NOTE — DISCHARGE SUMMARY
Iraj Handy [facility] [address]  [address]  [phone] [address] Adult  Hospitalist Group     Discharge Summary       PATIENT ID: Roro Lou  MRN: [id number]   YOB: 1950    DATE OF ADMISSION: 6/24/2022 10:15 AM    DATE OF DISCHARGE: 06/29/22   PRIMARY CARE PROVIDER: Adela Collins MD     DISCHARGING PROVIDER: Guille Landeros MD      CONSULTATIONS: IP CONSULT TO ORTHOPEDIC SURGERY  IP CONSULT TO CARDIOLOGY    PROCEDURES/SURGERIES: Procedure(s):  HIP PERCUTANEOUS PINNING CANNULATED SCREWS    ADMITTING [address] [place] [address] COURSE:   Femur fracture  -after GLF at home   -S/p hip percutaneous pinning cannulated screws 6/24 2022  -Awaiting inpatient rehab     RIGHT foot drop due to previous sarcoma resection from the [facility]   · PT OT to continue to work  · Continue fall precautions   · Pain control with Tylenol and minimum narcotics      CAD   HTN -stable   · Resume metoprolol     Hypothyroidism  S/p total thyroidectomy . -chronic   · Continue supplementation with levothyroxine     Hx of CVA in the past -  · Continue supportive care      Osteoporosis  -pt received last injection about a year ago by her endcrinologist   · Add VIt D and Ca+ supplementation      Hx of PAD -s/p Right Sup Fem artery PCI   · Continue Plavix as is       PENDING TEST RESULTS:   At the time of discharge the following test results are still pending: None    FOLLOW UP APPOINTMENTS:    Follow-up Information     Follow up With Specialties Details Why Contact Blanca Cerrato MD Orthopedic Surgery In 2 weeks  [id number] [place] [address]  [address]  [address] [facility]  [phone]      Adela Collins MD Gastroenterology In 1 week Hospital discharge follow up Elisa Jones   [phone]               DIET: Regular    ACTIVITY: As tolerated      DISCHARGE MEDICATIONS:  Current Discharge Medication List START taking these medications    Details   bisacodyL (DULCOLAX) 10 mg supp Insert 10 mg into rectum daily as needed for Constipation (For unrelieved constipation. ). Qty: 10 Each, Refills: 0  Start date: 6/29/2022      diclofenac (VOLTAREN) 1 % gel Apply 2 g to affected area four (4) times daily. Qty: 1 g, Refills: 0  Start date: 6/29/2022      calcium-vitamin D (OS-ANTWAN +D3) 500 mg-200 unit per tablet Take 1 Tablet by mouth three (3) times daily (with meals). Qty: 30 Tablet, Refills: 0  Start date: 6/29/2022      oxyCODONE IR (ROXICODONE) 5 mg immediate release tablet Take 1 Tablet by mouth every four (4) hours as needed (For pain 7-10) for up to 3 days. Max Daily Amount: 30 mg.  Qty: 15 Tablet, Refills: 0  Start date: 6/29/2022, End date: 7/2/2022    Associated Diagnoses: Other closed fracture of head or neck of left femur, initial encounter ([facility])      polyethylene glycol (MIRALAX) 17 gram packet Take 1 Packet by mouth daily. Qty: 30 Packet, Refills: 0  Start date: 6/30/2022      senna-docusate (PERICOLACE) 8.6-50 mg per tablet Take 1 Tablet by mouth two (2) times a day. Qty: 30 Tablet, Refills: 0  Start date: 6/29/2022      enoxaparin (LOVENOX) 40 mg/0.4 mL 0.4 mL by SubCUTAneous route daily for 30 days. Qty: 30 Each, Refills: 0  Start date: 6/30/2022, End date: 7/30/2022         CONTINUE these medications which have NOT CHANGED    Details   aspirin delayed-release 81 mg tablet Take 81 mg by mouth four (4) times daily (with meals). ezetimibe (Zetia) 10 mg tablet Take 10 mg by mouth daily. azelastine (ASTELIN) 137 mcg (0.1 %) nasal spray 1 [place] by Both Nostrils route two (2) times daily as needed for Rhinitis. Use in each nostril as directed      omega 3-DHA-EPA-fish oil 1,000 mg (120 mg-180 mg) capsule Take 1 Capsule by mouth daily. loratadine (Claritin) 10 mg tablet Take 10 mg by mouth daily as needed for Allergies.       vitamin R-Y-O-lutein-minerals (OCUVITE) tablet Take 1 Tablet by mouth daily. levothyroxine (SYNTHROID) 50 mcg tablet Take 50 mcg by mouth Daily (before breakfast). zoledronic acid (RECLAST) 5 mg/100 mL pgbk 5 mg by IntraVENous route once. clopidogrel (PLAVIX) 75 mg tab Take 75 mg by mouth daily. losartan (COZAAR) 50 mg tablet Take 50 mg by mouth daily as needed (SBP > 120). rosuvastatin (CRESTOR) 10 mg tablet Take 10 mg by mouth nightly. metoprolol tartrate (LOPRESSOR) 25 mg tablet Take 25 mg by mouth two (2) times a day. cholecalciferol (VITAMIN D3) 1,000 unit tablet Take 1,000 Units by mouth daily. NOTIFY YOUR PHYSICIAN FOR ANY OF THE FOLLOWING:   Fever over 101 degrees for 24 hours. Chest pain, shortness of breath, fever, chills, nausea, vomiting, diarrhea, change in mentation, falling, weakness, bleeding. Severe pain or pain not relieved by medications. Or, any other signs or symptoms that you may have questions about. DISPOSITION:    Home With:   OT  PT  HH  RN      x Long term SNF/Inpatient Rehab    Independent/assisted living    Hospice    Other:       PATIENT CONDITION AT DISCHARGE:     Functional status    Poor    x Deconditioned     Independent      Cognition   x  Lucid     Forgetful     Dementia      Catheters/lines (plus indication)    Jiménez     PICC     PEG    x None      Code status   x  Full code     DNR      PHYSICAL EXAMINATION AT DISCHARGE:  General:          Alert, cooperative, no distress, appears stated age. HEENT:           Atraumatic, anicteric sclerae, pink conjunctivae                          No oral ulcers, mucosa moist, throat clear, dentition fair  Neck:               Supple, symmetrical  Lungs:             Clear to auscultation bilaterally. No Wheezing or Rhonchi. No rales. Heart:              Regular  rhythm,  No  murmur   No edema  Abdomen:        Soft, non-tender. Not distended. Bowel sounds normal  Extremities:     No cyanosis.   No clubbing,                            Skin turgor normal, Capillary refill normal  Skin:                Not pale. Not Jaundiced  No rashes   Psych:             Not anxious or agitated. Neurologic:      Alert, moves all extremities, answers questions appropriately and responds to commands       CHRONIC MEDICAL DIAGNOSES:  Problem List as of 6/29/2022 Date Reviewed: 6/24/2022          Codes Class Noted - Resolved    * (Principal) Femur fracture ([facility] [address] ICD-10-CM: C26.13JA  ICD-9-CM: 821.00  6/24/2022 - Present        Thyroid disease ICD-10-CM: E07.9  ICD-9-CM: 246. 9  Unknown - Present        Ill-defined condition ICD-10-CM: R69  ICD-9-CM: 799.89  Unknown - Present    Overview Signed 6/24/2022  1:05 PM by Sunny Daley MD     osteoporosis             CAD (coronary artery disease) ICD-10-CM: I25.10  ICD-9-CM: 414.00  Unknown - Present    Overview Signed 6/24/2022  1:05 PM by Sunny Daley MD     heart attack/stents             History of CVA (cerebrovascular accident) ICD-10-CM: Z86.73  ICD-9-CM: V12.54  6/24/2022 - Present    Overview Addendum 6/24/2022  1:07 PM by Sunny Daley MD     Noted on MRI in 11/2021 => chronic infarct L centrum semiovale             RESOLVED: Altered mental state ICD-10-CM: R41.82  ICD-9-CM: 780.97  11/28/2021 - 6/24/2022        RESOLVED: Acute CVA (cerebrovascular accident) [facility]) ICD-10-CM: I63.9  ICD-9-CM: 434.91  11/28/2021 - 6/24/2022              Greater than 30 minutes were spent with the patient on counseling and coordination of care    Signed:   Destiney Garcia MD  6/29/2022  11:47 AM

## 2022-06-29 NOTE — PROGRESS NOTES
06/29/22      Medicare pt has received, reviewed, and signed 2nd IM letter informing them of their right to appeal the discharge. Signed copied has been placed on pt bedside chart.

## 2022-07-08 NOTE — PROGRESS NOTES
Physician Progress Note      Isela Patel  CSN #:                  [id number]  :                       1950  ADMIT DATE:       2022 10:15 AM  Kevin Rossi DATE:        2022 2:19 PM  RESPONDING  PROVIDER #:        Lilly Kamara MD          QUERY TEXTLorelie Metro Afternoon    This patient admitted on 2022---2022- for  Femur fracutre and s/p Hip pinning. On  RD was consulted. If possible, please document in progress notes and discharge summary if you are evaluating and /or treating any of the following: The medical record reflects the following:  Risk Factors: Femur fracture s/p hip pinning, CAD, Cancer, Thyroid diese. Clinical Indicators: Per RD assessment on , pt with Mild body fat loss, and mild body fat loss in the calf , scapula. She had not had BM. Treatment: RD consulted,  Change ONS to low carb High Protein product, Ensure high protein daily provides 160 kcals, 16 gram protein and 19 gram carb. Encourage adequate po fluid intake for constipation    Thank you  [facility] [place], [place], [address] [place] [address]  [phone]    ASPEN Criteria:  https://aspenjournals. onlinelibrary. curry. com/doi/full/10.1177/4474230681939416  Options provided:  -- Protein calorie malnutrition mild  -- Other - I will add my own diagnosis  -- Disagree - Not applicable / Not valid  -- Disagree - Clinically unable to determine / Unknown  -- Refer to Clinical Documentation Reviewer    PROVIDER RESPONSE TEXT:    This patient has mild protein calorie malnutrition.     Query created by: Eren Villarreal on 2022 1:48 PM      Electronically signed by:  Lilly Kamara MD 2022 3:21 PM

## 2024-03-08 ENCOUNTER — HOSPITAL ENCOUNTER (INPATIENT)
Facility: HOSPITAL | Age: 74
LOS: 5 days | Discharge: NURSING, REHAB OR LONG-TERM CARE | DRG: 482 | End: 2024-03-13
Attending: EMERGENCY MEDICINE | Admitting: HOSPITALIST
Payer: MEDICARE

## 2024-03-08 ENCOUNTER — APPOINTMENT (OUTPATIENT)
Facility: HOSPITAL | Age: 74
DRG: 482 | End: 2024-03-08
Payer: MEDICARE

## 2024-03-08 DIAGNOSIS — R42 DIZZINESS: ICD-10-CM

## 2024-03-08 DIAGNOSIS — S72.002A CLOSED FRACTURE OF NECK OF LEFT FEMUR, INITIAL ENCOUNTER (HCC): Primary | ICD-10-CM

## 2024-03-08 DIAGNOSIS — S72.001A CLOSED RIGHT HIP FRACTURE, INITIAL ENCOUNTER (HCC): ICD-10-CM

## 2024-03-08 LAB
ALBUMIN SERPL-MCNC: 3.7 G/DL (ref 3.5–5)
ALBUMIN/GLOB SERPL: 1.2 (ref 1.1–2.2)
ALP SERPL-CCNC: 57 U/L (ref 45–117)
ALT SERPL-CCNC: 33 U/L (ref 12–78)
ANION GAP SERPL CALC-SCNC: 10 MMOL/L (ref 5–15)
APPEARANCE UR: CLEAR
AST SERPL-CCNC: 27 U/L (ref 15–37)
BACTERIA URNS QL MICRO: NEGATIVE /HPF
BASOPHILS # BLD: 0 K/UL (ref 0–0.1)
BASOPHILS NFR BLD: 0 % (ref 0–1)
BILIRUB SERPL-MCNC: 0.5 MG/DL (ref 0.2–1)
BILIRUB UR QL: NEGATIVE
BUN SERPL-MCNC: 26 MG/DL (ref 6–20)
BUN/CREAT SERPL: 30 (ref 12–20)
CALCIUM SERPL-MCNC: 9.5 MG/DL (ref 8.5–10.1)
CHLORIDE SERPL-SCNC: 102 MMOL/L (ref 97–108)
CO2 SERPL-SCNC: 27 MMOL/L (ref 21–32)
COLOR UR: ABNORMAL
COMMENT:: NORMAL
CREAT SERPL-MCNC: 0.87 MG/DL (ref 0.55–1.02)
DIFFERENTIAL METHOD BLD: ABNORMAL
EOSINOPHIL # BLD: 0 K/UL (ref 0–0.4)
EOSINOPHIL NFR BLD: 0 % (ref 0–7)
EPITH CASTS URNS QL MICRO: ABNORMAL /LPF
ERYTHROCYTE [DISTWIDTH] IN BLOOD BY AUTOMATED COUNT: 12.6 % (ref 11.5–14.5)
GLOBULIN SER CALC-MCNC: 3.2 G/DL (ref 2–4)
GLUCOSE SERPL-MCNC: 111 MG/DL (ref 65–100)
GLUCOSE UR STRIP.AUTO-MCNC: NEGATIVE MG/DL
HCT VFR BLD AUTO: 41.4 % (ref 35–47)
HGB BLD-MCNC: 14 G/DL (ref 11.5–16)
HGB UR QL STRIP: ABNORMAL
IMM GRANULOCYTES # BLD AUTO: 0.1 K/UL (ref 0–0.04)
IMM GRANULOCYTES NFR BLD AUTO: 1 % (ref 0–0.5)
KETONES UR QL STRIP.AUTO: NEGATIVE MG/DL
LEUKOCYTE ESTERASE UR QL STRIP.AUTO: NEGATIVE
LYMPHOCYTES # BLD: 1.2 K/UL (ref 0.8–3.5)
LYMPHOCYTES NFR BLD: 7 % (ref 12–49)
MCH RBC QN AUTO: 31.5 PG (ref 26–34)
MCHC RBC AUTO-ENTMCNC: 33.8 G/DL (ref 30–36.5)
MCV RBC AUTO: 93.2 FL (ref 80–99)
MONOCYTES # BLD: 0.7 K/UL (ref 0–1)
MONOCYTES NFR BLD: 4 % (ref 5–13)
NEUTS SEG # BLD: 14.9 K/UL (ref 1.8–8)
NEUTS SEG NFR BLD: 88 % (ref 32–75)
NITRITE UR QL STRIP.AUTO: NEGATIVE
NRBC # BLD: 0 K/UL (ref 0–0.01)
NRBC BLD-RTO: 0 PER 100 WBC
PH UR STRIP: 6.5 (ref 5–8)
PLATELET # BLD AUTO: 204 K/UL (ref 150–400)
PMV BLD AUTO: 10.8 FL (ref 8.9–12.9)
POTASSIUM SERPL-SCNC: 4.1 MMOL/L (ref 3.5–5.1)
PROT SERPL-MCNC: 6.9 G/DL (ref 6.4–8.2)
PROT UR STRIP-MCNC: NEGATIVE MG/DL
RBC # BLD AUTO: 4.44 M/UL (ref 3.8–5.2)
RBC #/AREA URNS HPF: ABNORMAL /HPF (ref 0–5)
SODIUM SERPL-SCNC: 139 MMOL/L (ref 136–145)
SP GR UR REFRACTOMETRY: 1.01 (ref 1–1.03)
SPECIMEN HOLD: NORMAL
URINE CULTURE IF INDICATED: ABNORMAL
UROBILINOGEN UR QL STRIP.AUTO: 0.2 EU/DL (ref 0.2–1)
WBC # BLD AUTO: 17 K/UL (ref 3.6–11)
WBC URNS QL MICRO: ABNORMAL /HPF (ref 0–4)

## 2024-03-08 PROCEDURE — 96372 THER/PROPH/DIAG INJ SC/IM: CPT

## 2024-03-08 PROCEDURE — 6370000000 HC RX 637 (ALT 250 FOR IP): Performed by: HOSPITALIST

## 2024-03-08 PROCEDURE — 1100000000 HC RM PRIVATE

## 2024-03-08 PROCEDURE — 85025 COMPLETE CBC W/AUTO DIFF WBC: CPT

## 2024-03-08 PROCEDURE — 73502 X-RAY EXAM HIP UNI 2-3 VIEWS: CPT

## 2024-03-08 PROCEDURE — 99285 EMERGENCY DEPT VISIT HI MDM: CPT

## 2024-03-08 PROCEDURE — 2580000003 HC RX 258: Performed by: HOSPITALIST

## 2024-03-08 PROCEDURE — 93005 ELECTROCARDIOGRAM TRACING: CPT | Performed by: EMERGENCY MEDICINE

## 2024-03-08 PROCEDURE — 96374 THER/PROPH/DIAG INJ IV PUSH: CPT

## 2024-03-08 PROCEDURE — 72192 CT PELVIS W/O DYE: CPT

## 2024-03-08 PROCEDURE — 71045 X-RAY EXAM CHEST 1 VIEW: CPT

## 2024-03-08 PROCEDURE — 6360000002 HC RX W HCPCS: Performed by: EMERGENCY MEDICINE

## 2024-03-08 PROCEDURE — 81001 URINALYSIS AUTO W/SCOPE: CPT

## 2024-03-08 PROCEDURE — 73552 X-RAY EXAM OF FEMUR 2/>: CPT

## 2024-03-08 PROCEDURE — 80053 COMPREHEN METABOLIC PANEL: CPT

## 2024-03-08 PROCEDURE — APPNB15 APP NON BILLABLE TIME 0-15 MINS: Performed by: NURSE PRACTITIONER

## 2024-03-08 RX ORDER — ACETAMINOPHEN 650 MG/1
650 SUPPOSITORY RECTAL EVERY 6 HOURS SCHEDULED
Status: DISCONTINUED | OUTPATIENT
Start: 2024-03-09 | End: 2024-03-13 | Stop reason: HOSPADM

## 2024-03-08 RX ORDER — ACETAMINOPHEN 325 MG/1
650 TABLET ORAL EVERY 6 HOURS PRN
Status: DISCONTINUED | OUTPATIENT
Start: 2024-03-08 | End: 2024-03-08

## 2024-03-08 RX ORDER — ONDANSETRON 4 MG/1
4 TABLET, ORALLY DISINTEGRATING ORAL EVERY 8 HOURS PRN
Status: DISCONTINUED | OUTPATIENT
Start: 2024-03-08 | End: 2024-03-13 | Stop reason: HOSPADM

## 2024-03-08 RX ORDER — POTASSIUM CHLORIDE 7.45 MG/ML
10 INJECTION INTRAVENOUS PRN
Status: DISCONTINUED | OUTPATIENT
Start: 2024-03-08 | End: 2024-03-13 | Stop reason: HOSPADM

## 2024-03-08 RX ORDER — SODIUM CHLORIDE 0.9 % (FLUSH) 0.9 %
5-40 SYRINGE (ML) INJECTION EVERY 12 HOURS SCHEDULED
Status: DISCONTINUED | OUTPATIENT
Start: 2024-03-08 | End: 2024-03-10

## 2024-03-08 RX ORDER — SODIUM CHLORIDE 9 MG/ML
INJECTION, SOLUTION INTRAVENOUS CONTINUOUS
Status: DISCONTINUED | OUTPATIENT
Start: 2024-03-08 | End: 2024-03-10

## 2024-03-08 RX ORDER — SODIUM CHLORIDE 0.9 % (FLUSH) 0.9 %
5-40 SYRINGE (ML) INJECTION PRN
Status: DISCONTINUED | OUTPATIENT
Start: 2024-03-08 | End: 2024-03-10

## 2024-03-08 RX ORDER — SODIUM CHLORIDE 9 MG/ML
INJECTION, SOLUTION INTRAVENOUS PRN
Status: DISCONTINUED | OUTPATIENT
Start: 2024-03-08 | End: 2024-03-10

## 2024-03-08 RX ORDER — POLYETHYLENE GLYCOL 3350 17 G/17G
17 POWDER, FOR SOLUTION ORAL DAILY PRN
Status: DISCONTINUED | OUTPATIENT
Start: 2024-03-08 | End: 2024-03-13 | Stop reason: HOSPADM

## 2024-03-08 RX ORDER — POTASSIUM CHLORIDE 750 MG/1
40 TABLET, FILM COATED, EXTENDED RELEASE ORAL PRN
Status: DISCONTINUED | OUTPATIENT
Start: 2024-03-08 | End: 2024-03-13 | Stop reason: HOSPADM

## 2024-03-08 RX ORDER — MAGNESIUM SULFATE IN WATER 40 MG/ML
2000 INJECTION, SOLUTION INTRAVENOUS PRN
Status: DISCONTINUED | OUTPATIENT
Start: 2024-03-08 | End: 2024-03-13 | Stop reason: HOSPADM

## 2024-03-08 RX ORDER — OXYCODONE HYDROCHLORIDE 5 MG/1
5 TABLET ORAL EVERY 6 HOURS PRN
Status: DISCONTINUED | OUTPATIENT
Start: 2024-03-08 | End: 2024-03-13 | Stop reason: HOSPADM

## 2024-03-08 RX ORDER — ONDANSETRON 2 MG/ML
4 INJECTION INTRAMUSCULAR; INTRAVENOUS EVERY 6 HOURS PRN
Status: DISCONTINUED | OUTPATIENT
Start: 2024-03-08 | End: 2024-03-13 | Stop reason: HOSPADM

## 2024-03-08 RX ORDER — ACETAMINOPHEN 650 MG/1
650 SUPPOSITORY RECTAL EVERY 6 HOURS PRN
Status: DISCONTINUED | OUTPATIENT
Start: 2024-03-08 | End: 2024-03-08

## 2024-03-08 RX ORDER — ACETAMINOPHEN 325 MG/1
650 TABLET ORAL EVERY 6 HOURS SCHEDULED
Status: DISCONTINUED | OUTPATIENT
Start: 2024-03-09 | End: 2024-03-13 | Stop reason: HOSPADM

## 2024-03-08 RX ORDER — KETOROLAC TROMETHAMINE 30 MG/ML
30 INJECTION, SOLUTION INTRAMUSCULAR; INTRAVENOUS ONCE
Status: COMPLETED | OUTPATIENT
Start: 2024-03-08 | End: 2024-03-08

## 2024-03-08 RX ADMIN — SODIUM CHLORIDE, PRESERVATIVE FREE 10 ML: 5 INJECTION INTRAVENOUS at 23:06

## 2024-03-08 RX ADMIN — SODIUM CHLORIDE: 9 INJECTION, SOLUTION INTRAVENOUS at 23:06

## 2024-03-08 RX ADMIN — OXYCODONE 5 MG: 5 TABLET ORAL at 23:14

## 2024-03-08 RX ADMIN — FENTANYL CITRATE 25 MCG: 50 INJECTION, SOLUTION INTRAMUSCULAR; INTRAVENOUS at 20:21

## 2024-03-08 RX ADMIN — KETOROLAC TROMETHAMINE 30 MG: 30 INJECTION, SOLUTION INTRAMUSCULAR; INTRAVENOUS at 16:52

## 2024-03-08 RX ADMIN — ACETAMINOPHEN 650 MG: 325 TABLET ORAL at 23:01

## 2024-03-08 ASSESSMENT — PAIN DESCRIPTION - ORIENTATION
ORIENTATION: RIGHT
ORIENTATION: RIGHT;LEFT
ORIENTATION: RIGHT
ORIENTATION: RIGHT

## 2024-03-08 ASSESSMENT — PAIN SCALES - GENERAL
PAINLEVEL_OUTOF10: 5
PAINLEVEL_OUTOF10: 6
PAINLEVEL_OUTOF10: 3
PAINLEVEL_OUTOF10: 5
PAINLEVEL_OUTOF10: 3
PAINLEVEL_OUTOF10: 5

## 2024-03-08 ASSESSMENT — PAIN DESCRIPTION - LOCATION
LOCATION: HIP;KNEE
LOCATION: HEAD
LOCATION: HIP
LOCATION: HIP;KNEE
LOCATION: HIP

## 2024-03-08 ASSESSMENT — PAIN DESCRIPTION - DESCRIPTORS
DESCRIPTORS: DULL
DESCRIPTORS: ACHING
DESCRIPTORS: DULL

## 2024-03-08 NOTE — ED TRIAGE NOTES
Pt presents via EMS with c/o right hip pain.  Pt rpts she tripped and fell landing on that side.  Denies head injury or LOC.  Does take blood thinners. Unwitnessed fall.

## 2024-03-08 NOTE — ED PROVIDER NOTES
[facility] EMERGENCY DEPT  EMERGENCY DEPARTMENT ENCOUNTER      Pt Name: Dorinda Olivares  MRN: [id number]  Birthdate 1950  Date of evaluation: 3/8/2024  Provider: Leonel Jones MD    CHIEF COMPLAINT       Chief Complaint   Patient presents with    Fall    Hip Injury         HISTORY OF PRESENT ILLNESS   (Location/Symptom, Timing/Onset, Context/Setting, Quality, Duration, Modifying Factors, Severity)  Note limiting factors.   Dorinda Olivares is a 73 y.o. female who presents to the emergency department      The history is provided by the patient. No  was used.   Leg Injury  Location:  Hip  Injury: yes    Mechanism of injury: fall    Hip location:  R hip  Pain details:     Quality:  Dull    Radiates to:  R leg    Severity:  Moderate    Onset quality:  Sudden    Timing:  Constant    Progression:  Unchanged  Chronicity:  New  Prior injury to area:  No  Ineffective treatments:  None tried  Associated symptoms: decreased ROM    Associated symptoms: no back pain, no fever and no neck pain        Nursing Notes were reviewed.    REVIEW OF SYSTEMS    (2-9 systems for level 4, 10 or more for level 5)     Review of Systems   Constitutional:  Negative for activity change, chills and fever.   HENT:  Negative for nosebleeds.    Eyes:  Negative for visual disturbance.   Respiratory:  Negative for shortness of breath.    Cardiovascular:  Negative for chest pain and palpitations.   Gastrointestinal:  Negative for abdominal pain, constipation, diarrhea, nausea and vomiting.   Genitourinary:  Negative for difficulty urinating, dysuria, hematuria and urgency.   Musculoskeletal:  Positive for arthralgias. Negative for back pain, neck pain and neck stiffness.   Skin:  Negative for color change.   Allergic/Immunologic: Negative for immunocompromised state.   Neurological:  Negative for dizziness, seizures, syncope, weakness, light-headedness, numbness and headaches.   Psychiatric/Behavioral:  Negative for behavioral problems,  female  Working Diagnosis:   1. Closed fracture of neck of left femur, initial encounter ([facility])        COVID-19 Suspicion: No  Sepsis present:  No  Reassessment needed: No  Code Status:  Full Code  Readmission: No  Isolation Requirements: no  Recommended Level of Care: med/surg  Department: [place] [facility] - [phone]  Consulting Provider: d/w Dr. Harrington        CONSULTS:  None    PROCEDURES:  Unless otherwise noted below, none     Procedures        FINAL IMPRESSION    No diagnosis found.      DISPOSITION/PLAN   DISPOSITION        PATIENT REFERRED TO:  No follow-up provider specified.    DISCHARGE MEDICATIONS:  New Prescriptions    No medications on file     Controlled Substances Monitoring:          No data to display                (Please note that portions of this note were completed with a voice recognition program.  Efforts were made to edit the dictations but occasionally words are mis-transcribed.)    Leonel Jones MD (electronically signed)  Attending Emergency Physician           Leonel Jones MD  03/08/24 4602

## 2024-03-08 NOTE — ED NOTES
----- Message from Dana Rogers PA-C sent at 10/19/2021  7:49 AM CDT -----  Please inform-vitamin-D level improved but still wall.  Increase prescription strength vitamin-D 26385 IU to 3 times a week.  Recheck vitamin-D level in 3 months.   Pt back from CT

## 2024-03-09 PROBLEM — R42 DIZZINESS: Status: ACTIVE | Noted: 2024-03-09

## 2024-03-09 LAB
ANION GAP SERPL CALC-SCNC: 3 MMOL/L (ref 5–15)
BASOPHILS # BLD: 0 K/UL (ref 0–0.1)
BASOPHILS NFR BLD: 0 % (ref 0–1)
BUN SERPL-MCNC: 22 MG/DL (ref 6–20)
BUN/CREAT SERPL: 26 (ref 12–20)
CALCIUM SERPL-MCNC: 8.9 MG/DL (ref 8.5–10.1)
CHLORIDE SERPL-SCNC: 106 MMOL/L (ref 97–108)
CO2 SERPL-SCNC: 27 MMOL/L (ref 21–32)
CREAT SERPL-MCNC: 0.86 MG/DL (ref 0.55–1.02)
DIFFERENTIAL METHOD BLD: ABNORMAL
EOSINOPHIL # BLD: 0 K/UL (ref 0–0.4)
EOSINOPHIL NFR BLD: 0 % (ref 0–7)
ERYTHROCYTE [DISTWIDTH] IN BLOOD BY AUTOMATED COUNT: 12.7 % (ref 11.5–14.5)
GLUCOSE SERPL-MCNC: 156 MG/DL (ref 65–100)
HCT VFR BLD AUTO: 39.3 % (ref 35–47)
HGB BLD-MCNC: 13.2 G/DL (ref 11.5–16)
IMM GRANULOCYTES # BLD AUTO: 0 K/UL (ref 0–0.04)
IMM GRANULOCYTES NFR BLD AUTO: 0 % (ref 0–0.5)
LYMPHOCYTES # BLD: 0.7 K/UL (ref 0.8–3.5)
LYMPHOCYTES NFR BLD: 5 % (ref 12–49)
MCH RBC QN AUTO: 31.3 PG (ref 26–34)
MCHC RBC AUTO-ENTMCNC: 33.6 G/DL (ref 30–36.5)
MCV RBC AUTO: 93.1 FL (ref 80–99)
MONOCYTES # BLD: 0.4 K/UL (ref 0–1)
MONOCYTES NFR BLD: 3 % (ref 5–13)
NEUTS SEG # BLD: 13.6 K/UL (ref 1.8–8)
NEUTS SEG NFR BLD: 92 % (ref 32–75)
NRBC # BLD: 0 K/UL (ref 0–0.01)
NRBC BLD-RTO: 0 PER 100 WBC
PLATELET # BLD AUTO: 199 K/UL (ref 150–400)
PMV BLD AUTO: 11.1 FL (ref 8.9–12.9)
POTASSIUM SERPL-SCNC: 4 MMOL/L (ref 3.5–5.1)
RBC # BLD AUTO: 4.22 M/UL (ref 3.8–5.2)
RBC MORPH BLD: ABNORMAL
SODIUM SERPL-SCNC: 136 MMOL/L (ref 136–145)
WBC # BLD AUTO: 14.7 K/UL (ref 3.6–11)

## 2024-03-09 PROCEDURE — 36415 COLL VENOUS BLD VENIPUNCTURE: CPT

## 2024-03-09 PROCEDURE — 6360000002 HC RX W HCPCS: Performed by: INTERNAL MEDICINE

## 2024-03-09 PROCEDURE — 1100000000 HC RM PRIVATE

## 2024-03-09 PROCEDURE — APPNB45 APP NON BILLABLE 31-45 MINUTES: Performed by: PHYSICIAN ASSISTANT

## 2024-03-09 PROCEDURE — 85025 COMPLETE CBC W/AUTO DIFF WBC: CPT

## 2024-03-09 PROCEDURE — 2580000003 HC RX 258: Performed by: HOSPITALIST

## 2024-03-09 PROCEDURE — 6370000000 HC RX 637 (ALT 250 FOR IP): Performed by: HOSPITALIST

## 2024-03-09 PROCEDURE — 94761 N-INVAS EAR/PLS OXIMETRY MLT: CPT

## 2024-03-09 PROCEDURE — 80048 BASIC METABOLIC PNL TOTAL CA: CPT

## 2024-03-09 PROCEDURE — 99222 1ST HOSP IP/OBS MODERATE 55: CPT | Performed by: SPECIALIST

## 2024-03-09 RX ORDER — ROSUVASTATIN CALCIUM 10 MG/1
10 TABLET, COATED ORAL
Status: DISCONTINUED | OUTPATIENT
Start: 2024-03-09 | End: 2024-03-13 | Stop reason: HOSPADM

## 2024-03-09 RX ORDER — SENNA AND DOCUSATE SODIUM 50; 8.6 MG/1; MG/1
1 TABLET, FILM COATED ORAL 2 TIMES DAILY
Status: DISCONTINUED | OUTPATIENT
Start: 2024-03-09 | End: 2024-03-13 | Stop reason: HOSPADM

## 2024-03-09 RX ORDER — LEVOTHYROXINE SODIUM 0.07 MG/1
75 TABLET ORAL
Status: DISCONTINUED | OUTPATIENT
Start: 2024-03-09 | End: 2024-03-13 | Stop reason: HOSPADM

## 2024-03-09 RX ORDER — ENOXAPARIN SODIUM 100 MG/ML
40 INJECTION SUBCUTANEOUS ONCE
Status: COMPLETED | OUTPATIENT
Start: 2024-03-09 | End: 2024-03-09

## 2024-03-09 RX ORDER — EZETIMIBE 10 MG/1
10 TABLET ORAL DAILY
Status: DISCONTINUED | OUTPATIENT
Start: 2024-03-09 | End: 2024-03-13 | Stop reason: HOSPADM

## 2024-03-09 RX ORDER — LOSARTAN POTASSIUM 50 MG/1
50 TABLET ORAL DAILY PRN
Status: DISCONTINUED | OUTPATIENT
Start: 2024-03-09 | End: 2024-03-13 | Stop reason: HOSPADM

## 2024-03-09 RX ADMIN — Medication 1 TABLET: at 01:48

## 2024-03-09 RX ADMIN — METOPROLOL TARTRATE 25 MG: 25 TABLET, FILM COATED ORAL at 21:11

## 2024-03-09 RX ADMIN — SENNOSIDES AND DOCUSATE SODIUM 1 TABLET: 50; 8.6 TABLET ORAL at 08:27

## 2024-03-09 RX ADMIN — EZETIMIBE 10 MG: 10 TABLET ORAL at 08:27

## 2024-03-09 RX ADMIN — SENNOSIDES AND DOCUSATE SODIUM 1 TABLET: 50; 8.6 TABLET ORAL at 21:11

## 2024-03-09 RX ADMIN — ROSUVASTATIN CALCIUM 10 MG: 10 TABLET, COATED ORAL at 15:58

## 2024-03-09 RX ADMIN — METOPROLOL TARTRATE 25 MG: 25 TABLET, FILM COATED ORAL at 01:48

## 2024-03-09 RX ADMIN — Medication 1 TABLET: at 08:26

## 2024-03-09 RX ADMIN — ACETAMINOPHEN 650 MG: 325 TABLET ORAL at 17:43

## 2024-03-09 RX ADMIN — SODIUM CHLORIDE, PRESERVATIVE FREE 10 ML: 5 INJECTION INTRAVENOUS at 08:29

## 2024-03-09 RX ADMIN — ENOXAPARIN SODIUM 40 MG: 100 INJECTION SUBCUTANEOUS at 15:58

## 2024-03-09 RX ADMIN — OXYCODONE 5 MG: 5 TABLET ORAL at 21:11

## 2024-03-09 RX ADMIN — ACETAMINOPHEN 650 MG: 325 TABLET ORAL at 06:16

## 2024-03-09 RX ADMIN — Medication 1 TABLET: at 21:11

## 2024-03-09 RX ADMIN — SODIUM CHLORIDE, PRESERVATIVE FREE 10 ML: 5 INJECTION INTRAVENOUS at 21:13

## 2024-03-09 RX ADMIN — ACETAMINOPHEN 650 MG: 325 TABLET ORAL at 11:29

## 2024-03-09 RX ADMIN — METOPROLOL TARTRATE 25 MG: 25 TABLET, FILM COATED ORAL at 08:27

## 2024-03-09 RX ADMIN — LEVOTHYROXINE SODIUM 75 MCG: 0.07 TABLET ORAL at 06:16

## 2024-03-09 ASSESSMENT — PAIN DESCRIPTION - DESCRIPTORS
DESCRIPTORS: ACHING
DESCRIPTORS: ACHING

## 2024-03-09 ASSESSMENT — PAIN DESCRIPTION - LOCATION
LOCATION: HIP;KNEE
LOCATION: KNEE

## 2024-03-09 ASSESSMENT — PAIN SCALES - GENERAL
PAINLEVEL_OUTOF10: 5
PAINLEVEL_OUTOF10: 8

## 2024-03-09 ASSESSMENT — PAIN DESCRIPTION - ORIENTATION
ORIENTATION: RIGHT
ORIENTATION: RIGHT

## 2024-03-09 NOTE — PLAN OF CARE
Problem: Skin/Tissue Integrity  Goal: Absence of new skin breakdown  Description: 1.  Monitor for areas of redness and/or skin breakdown  2.  Assess vascular access sites hourly  3.  Every 4-6 hours minimum:  Change oxygen saturation probe site  4.  Every 4-6 hours:  If on nasal continuous positive airway pressure, respiratory therapy assess nares and determine need for appliance change or resting period.  3/9/2024 0943 by Juan C Walker RN  Outcome: Progressing  3/9/2024 0040 by Estefany Aguilar LPN  Outcome: Progressing     Problem: Safety - Adult  Goal: Free from fall injury  3/9/2024 0943 by Juan C Walker RN  Outcome: Progressing  3/9/2024 0040 by Estefany Aguilar LPN  Outcome: Progressing     Problem: ABCDS Injury Assessment  Goal: Absence of physical injury  3/9/2024 0943 by Juan C Walker RN  Outcome: Progressing  3/9/2024 0040 by Estefany Aguilar LPN  Outcome: Progressing     Problem: Chronic Conditions and Co-morbidities  Goal: Patient's chronic conditions and co-morbidity symptoms are monitored and maintained or improved  Outcome: Progressing     Problem: Discharge Planning  Goal: Discharge to home or other facility with appropriate resources  Outcome: Progressing     Problem: Pain  Goal: Verbalizes/displays adequate comfort level or baseline comfort level  Outcome: Progressing

## 2024-03-09 NOTE — H&P
Hospitalist Admission Note    NAME: Dorinda Olivares   :  1950   MRN:  [id number]     Date/Time:  3/8/2024 11:27 PM    Patient PCP: Kenneth Rosas MD    Please note that this dictation was completed with [employer], the computer voice recognition software.  Quite often unanticipated grammatical, syntax, homophones, and other interpretive errors are inadvertently transcribed by the computer software.  Please disregard these errors.  Please excuse any errors that have escaped final proofreading  ________________________________________________________________________    Given the patient's current clinical presentation, I have a high level of concern for decompensation if discharged from the emergency department.  Complex decision making was performed, which includes reviewing the patient's available past medical records, laboratory results, and x-ray films.       My assessment of this patient's clinical condition and my plan of care is as follows.    Assessment / Plan:    Acute right femoral neck fracture, POA due to mechanical fall  --consult orthopedic, npo after midnight, gentle IVF  --consult cardiology for preop clearance.  Interrogate [place] pacemaker.  She reports continued intermittent dizziness despite pacemaker placement  for syncope and dizziness.  Echo  EF 60-65%.  She reports negative stress test  with Dr. Sheldon De with [facility]  --schedule tylenol q6h.  Oxycodone prn and dilaudid prn    Leukocytosis WBC 17K  --suspect reactive to fracture, no fever, UA normal, CXR no acute process    CAD s/p MI and RCA stent   Hx syncope due to heart block s/p pacemaker   HTN  --denies chest pain; stable MENDEZ  --hold aspirin and plavix until after surgery  --cont crestor, zetia  --interrogate pacemaker (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 Owusu  Has pulmonary nodules being monitored by Dr. Owusu at [facility]  --fu with Dr. Owusu    Hepatomegaly on CT pelvis  Has hepatic lobe hemangioma on [facility] CT chest  --LFTs normal.  FU with PCP and Dr. Owusu    Hypothyroid s/p partial thyroidectomy  --continue levothyroxine    Hx CVA on MRI left centrum ovale 11/21  --resume asa  and plavix after surgery    PAD s/p right superficial femoral artery PCI  --resume aspirin after surgery.  Continue crestor, zetia    Medical Decision Making:   I have personally reviewed the radiographs, laboratory data in Epic and decisions and statements above are based partially on this personal interpretation.    Drug monitoring:       Code Status: Full Code  DVT Prophylaxis: SCD's  GI Prophylaxis: not indicated         Subjective:   CHIEF COMPLAINT: \"right leg pain s/p fall\"    HISTORY OF PRESENT ILLNESS:     Dorinda Olivares is a 73 y.o.  female with PMHx sarcoma of the right leg status post resection with later mets test assist to the lung status post right lung wedge resection at [facility] in 2013, history of left hip fracture 2022 due to fall, coronary artery disease with history of MI and RCA stent in 2010, history of syncope and heart block status post pacemaker in December 2023 follow with [facility]on De [facility], history of stroke on MRI, peripheral artery disease with right superficial femoral artery angioplasty and stenting who tripped over duffel bag and fell down onto her right leg with subsequent pain.  She called her son who then called EMS when he came home.  CT of the pelvis shows acute right femoral neck fracture and incidental hepatomegaly.    Patient denies any chest pain.  Since COVID and wearing a mask she has noticed dyspnea with exertion but this has been stable.  She underwent stress testing in April 2023 that she reports was normal.  She experience recurrent dizziness and

## 2024-03-09 NOTE — CONSULTS
vertigo.\"    She notes long history of dizziness. Denies any worsening CP.  Has chronic MENDEZ.           Assessment and Plan         Preop Risk assessment for hip surgery   - Acute right femoral neck fracture, POA due to mechanical fall  - She can proceed with surgery and should have intermediate risk of cardiac complications   - Was on DAPT on admission - can hold DAPT - It takes about 5 days for effects of plavix to wear off.  Resume DAPT when safe from surgical standpoint.     CAD s/p MI and RCA stent 2010  - Echo 2021 with LVEF 60-65%  - normal stress test 4/23 per patient   - on DAPT. 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 Owusu  - Chronic right foot drop s/p resection of sarcoma from right leg 2013     Hx CVA on MRI left centrum ovale 11/21  - DAPT, statin PTA       I will sign off. Please call if any further questions.       Patient should follow-up with Dr. De and Dr. Joseph with [facility] cardiology for management of her CAD and PAD.                ____________________________________________________________    Cardiac testing    Telemetry checked - A sensed, V paced    EKG - A sensed, V paced     Echo 11/30/21 - LVEF 60-65%        Past Medical History:  Past Medical History:   Diagnosis Date    Arthritis     right knee - cortison injection    CAD (coronary artery disease)     heart attack/stents    Cancer (HCC)     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    Heart block 12/2023    with syncope, s/p pacemaker    Hypertension     Ill-defined condition     hiatal hernia    Ill-defined condition     osteoporosis  (L) 5 - 15 mmol/L    Glucose 156 (H) 65 - 100 mg/dL    BUN 22 (H) 6 - 20 MG/DL    Creatinine 0.86 0.55 - 1.02 MG/DL    Bun/Cre Ratio 26 (H) 12 - 20      Est, Glom Filt Rate >60 >60 ml/min/1.73m2    Calcium 8.9 8.5 - 10.1 MG/DL   CBC with Auto Differential    Collection Time: 03/09/24  1:55 AM   Result Value Ref Range    WBC 14.7 (H) 3.6 - 11.0 K/uL    RBC 4.22 3.80 - 5.20 M/uL    Hemoglobin 13.2 11.5 - 16.0 g/dL    Hematocrit 39.3 35.0 - 47.0 %    MCV 93.1 80.0 - 99.0 FL    MCH 31.3 26.0 - 34.0 PG    MCHC 33.6 30.0 - 36.5 g/dL    RDW 12.7 11.5 - 14.5 %    Platelets 199 150 - 400 K/uL    MPV 11.1 8.9 - 12.9 FL    Nucleated RBCs 0.0 0  WBC    nRBC 0.00 0.00 - 0.01 K/uL    Neutrophils % 92 (H) 32 - 75 %    Lymphocytes % 5 (L) 12 - 49 %    Monocytes % 3 (L) 5 - 13 %    Eosinophils % 0 0 - 7 %    Basophils % 0 0 - 1 %    Immature Granulocytes 0 0.0 - 0.5 %    Neutrophils Absolute 13.6 (H) 1.8 - 8.0 K/UL    Lymphocytes Absolute 0.7 (L) 0.8 - 3.5 K/UL    Monocytes Absolute 0.4 0.0 - 1.0 K/UL    Eosinophils Absolute 0.0 0.0 - 0.4 K/UL    Basophils Absolute 0.0 0.0 - 0.1 K/UL    Absolute Immature Granulocyte 0.0 0.00 - 0.04 K/UL    Differential Type SMEAR SCANNED      RBC Comment NORMOCYTIC, NORMOCHROMIC               Current medications and allergies:    Allergy:  Allergies   Allergen Reactions    Mite (D. Farinae) Other (See Comments)     Mite and grass allergies.    Morphine Itching and Nausea Only     Nausea came after pt was given something for itching.    Other Other (See Comments)     Mite and grass allergies.         Current Facility-Administered Medications:     ezetimibe (ZETIA) tablet 10 mg, 10 mg, Oral, Daily, Larkin, Pat H, MD, 10 mg at 03/09/24 0827    levothyroxine (SYNTHROID) tablet 75 mcg, 75 mcg, Oral, QAM AC, Pat Larkin MD, 75 mcg at 03/09/24 0616    metoprolol tartrate (LOPRESSOR) tablet 25 mg, 25 mg, Oral, BID, Pat Larkin MD, 25 mg at 03/09/24 0827    rosuvastatin (CRESTOR) tablet 10

## 2024-03-09 NOTE — PROGRESS NOTES
Hospitalist Progress Note    NAME: Dorinda Olivares   :  1950   MRN:  [id number]     Date/Time:  3/9/2024 8:34 AM    Patient PCP: Kenneth Rosas MD       Subjective:   CHIEF COMPLAINT: Femur fracture       Patient with right femur fracture.  Plan for surgery tomorrow.  Needs cardiac clearance.  Patient complains of chronic dizziness.  Unable to stand for orthostatic vitals.  Heart rate and blood pressure are stable at present.  EKG reviewed, paced rhythm.  Pain is controlled.          Objective:   VITALS:    Vitals:    24 0713   BP: (!) 121/57   Pulse: 75   Resp: 16   Temp: 98.4 °F (36.9 °C)   SpO2: 96%       PHYSICAL EXAM:      General:   No acute distress, resting comfortably in bed.  Heart:  RRR, No MRG  Lungs:  CTAB.  Non-labored breathing.  Abdomen:  Soft .  Nondistended.  NTTP.  BS present.  Extremities:  No edema.  Skin:  No rash  Musculoskeletal: Right hip deformity, tender to palpation, decreased mobility  Neuro:  Alert and interactive.  No focal deficits.  Psych: Mood stable.        LAB DATA REVIEWED:    Recent Results (from the past 24 hour(s))   EKG 12 Lead    Collection Time: 24  8:01 PM   Result Value Ref Range    Ventricular Rate 90 BPM    Atrial Rate 90 BPM    P-R Interval 218 ms    QRS Duration 130 ms    Q-T Interval 382 ms    QTc Calculation (Bazett) 467 ms    P Axis 76 degrees    R Axis -66 degrees    T Axis 92 degrees    Diagnosis       Atrial-sensed ventricular-paced rhythm with prolonged AV conduction  Abnormal ECG  When compared with ECG of 2022 11:53,  Electronic ventricular pacemaker has replaced Sinus rhythm  Vent. rate has increased BY  31 BPM     CBC with Auto Differential    Collection Time: 24  8:07 PM   Result Value Ref Range    WBC 17.0 (H) 3.6 - 11.0 K/uL    RBC 4.44 3.80 - 5.20 M/uL    Hemoglobin 14.0 11.5 - 16.0 g/dL    Hematocrit 41.4 35.0 - 47.0 %    MCV 93.2 80.0 - 99.0 FL    MCH 31.5 26.0 - 34.0 PG    MCHC 33.8 30.0 - 36.5 g/dL    RDW 12.6 11.5 -  14.5 %    Platelets 204 150 - 400 K/uL    MPV 10.8 8.9 - 12.9 FL    Nucleated RBCs 0.0 0.0  WBC    nRBC 0.00 0.00 - 0.01 K/uL    Neutrophils % 88 (H) 32 - 75 %    Lymphocytes % 7 (L) 12 - 49 %    Monocytes % 4 (L) 5 - 13 %    Eosinophils % 0 0 - 7 %    Basophils % 0 0 - 1 %    Immature Granulocytes 1 (H) 0 - 0.5 %    Neutrophils Absolute 14.9 (H) 1.8 - 8.0 K/UL    Lymphocytes Absolute 1.2 0.8 - 3.5 K/UL    Monocytes Absolute 0.7 0.0 - 1.0 K/UL    Eosinophils Absolute 0.0 0.0 - 0.4 K/UL    Basophils Absolute 0.0 0.0 - 0.1 K/UL    Absolute Immature Granulocyte 0.1 (H) 0.00 - 0.04 K/UL    Differential Type AUTOMATED     CMP    Collection Time: 03/08/24  8:07 PM   Result Value Ref Range    Sodium 139 136 - 145 mmol/L    Potassium 4.1 3.5 - 5.1 mmol/L    Chloride 102 97 - 108 mmol/L    CO2 27 21 - 32 mmol/L    Anion Gap 10 5 - 15 mmol/L    Glucose 111 (H) 65 - 100 mg/dL    BUN 26 (H) 6 - 20 MG/DL    Creatinine 0.87 0.55 - 1.02 MG/DL    Bun/Cre Ratio 30 (H) 12 - 20      Est, Glom Filt Rate >60 >60 ml/min/1.73m2    Calcium 9.5 8.5 - 10.1 MG/DL    Total Bilirubin 0.5 0.2 - 1.0 MG/DL    ALT 33 12 - 78 U/L    AST 27 15 - 37 U/L    Alk Phosphatase 57 45 - 117 U/L    Total Protein 6.9 6.4 - 8.2 g/dL    Albumin 3.7 3.5 - 5.0 g/dL    Globulin 3.2 2.0 - 4.0 g/dL    Albumin/Globulin Ratio 1.2 1.1 - 2.2     Extra Tubes Hold    Collection Time: 03/08/24  8:11 PM   Result Value Ref Range    Specimen HOld RED RONY PNK SST     Comment:        Add-on orders for these samples will be processed based on acceptable specimen integrity and analyte stability, which may vary by analyte.   Urinalysis with Reflex to Culture    Collection Time: 03/08/24  9:43 PM    Specimen: Urine   Result Value Ref Range    Color, UA YELLOW/STRAW      Appearance CLEAR CLEAR      Specific Gravity, UA 1.010 1.003 - 1.030      pH, Urine 6.5 5.0 - 8.0      Protein, UA Negative NEG mg/dL    Glucose, UA Negative NEG mg/dL    Ketones, Urine Negative NEG mg/dL

## 2024-03-09 NOTE — CONSULTS
Ortho consult:    Formal consult to follow. 74 yo female with right femoral neck fracture due to GLF. Pt with hx of CAD s/p MI with stent 2010, (on plavix and asa) pacemaker 12/23 due to syncope. Cardiology consult ordered for clearance. Last plavix dose was today.   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   Labs reviewed/ stable (leukocytosis as expected due to reactive from fracture) UA, CXR reviewed.  Maintain NWB  NPO after MN  Pain control with IV narcotics.   To discuss with Dr. Whitehead about timing of surgery.    Danna Ugalde NP

## 2024-03-09 NOTE — PROGRESS NOTES
Plan for OR tomorrow with Dr. Whitehead.  Patient needs cardiology clearance- recent pacemaker at [facility] per patient 12/2023.  NPO after midnight.  Ok to anticoagulate today and hold after for OR tomorrow.       MUKUL Alva-LUZ  Orthopaedic Surgery PA  [facility]  [facility]

## 2024-03-09 NOTE — ED NOTES
TRANSFER - OUT REPORT:    Verbal report given to TRAVON Cornejo on Dorinda Olivares  being transferred to 4th floor [facility] for routine progression of patient care       Report consisted of patient's Situation, Background, Assessment and   Recommendations(SBAR).     Information from the following report(s) ED Encounter Summary, ED SBAR, MAR, and Recent Results was reviewed with the receiving nurse.    [place] Fall Assessment:    Presents to emergency department  because of falls (Syncope, seizure, or loss of consciousness): Yes  Age > 70: Yes  Altered Mental Status, Intoxication with alcohol or substance confusion (Disorientation, impaired judgment, poor safety awaremess, or inability to follow instructions): No  Impaired Mobility: Ambulates or transfers with assistive devices or assistance; Unable to ambulate or transer.: Yes  Nursing Judgement: Yes          Lines:   Peripheral IV 03/08/24 Left Antecubital (Active)   Site Assessment Clean, dry & intact 03/08/24 2008   Line Status Blood return noted 03/08/24 2008   Phlebitis Assessment No symptoms 03/08/24 2008   Infiltration Assessment 0 03/08/24 2008   Dressing Status Clean, dry & intact 03/08/24 2008   Dressing Type Transparent 03/08/24 2008   Dressing Intervention New 03/08/24 2008        Opportunity for questions and clarification was provided.      Patient transported with:  O2 @ 2lpm  Patient belongings (clothing, slippers)

## 2024-03-09 NOTE — ED NOTES
R leg repositioned for comfort. Rolled up towel placed under knee. Pt reported improvement in pain with towel underneath knee

## 2024-03-10 ENCOUNTER — ANESTHESIA EVENT (OUTPATIENT)
Facility: HOSPITAL | Age: 74
End: 2024-03-10
Payer: MEDICARE

## 2024-03-10 ENCOUNTER — APPOINTMENT (OUTPATIENT)
Facility: HOSPITAL | Age: 74
DRG: 482 | End: 2024-03-10
Payer: MEDICARE

## 2024-03-10 ENCOUNTER — ANESTHESIA (OUTPATIENT)
Facility: HOSPITAL | Age: 74
End: 2024-03-10
Payer: MEDICARE

## 2024-03-10 LAB
ABO + RH BLD: NORMAL
ANION GAP SERPL CALC-SCNC: 3 MMOL/L (ref 5–15)
BLOOD GROUP ANTIBODIES SERPL: NORMAL
BUN SERPL-MCNC: 15 MG/DL (ref 6–20)
BUN/CREAT SERPL: 19 (ref 12–20)
CALCIUM SERPL-MCNC: 8.1 MG/DL (ref 8.5–10.1)
CHLORIDE SERPL-SCNC: 111 MMOL/L (ref 97–108)
CO2 SERPL-SCNC: 25 MMOL/L (ref 21–32)
CREAT SERPL-MCNC: 0.8 MG/DL (ref 0.55–1.02)
EKG ATRIAL RATE: 90 BPM
EKG DIAGNOSIS: NORMAL
EKG P AXIS: 76 DEGREES
EKG P-R INTERVAL: 218 MS
EKG Q-T INTERVAL: 382 MS
EKG QRS DURATION: 130 MS
EKG QTC CALCULATION (BAZETT): 467 MS
EKG R AXIS: -66 DEGREES
EKG T AXIS: 92 DEGREES
EKG VENTRICULAR RATE: 90 BPM
ERYTHROCYTE [DISTWIDTH] IN BLOOD BY AUTOMATED COUNT: 13.1 % (ref 11.5–14.5)
GLUCOSE SERPL-MCNC: 112 MG/DL (ref 65–100)
HCT VFR BLD AUTO: 35.7 % (ref 35–47)
HGB BLD-MCNC: 11.7 G/DL (ref 11.5–16)
MAGNESIUM SERPL-MCNC: 1.6 MG/DL (ref 1.6–2.4)
MCH RBC QN AUTO: 30.8 PG (ref 26–34)
MCHC RBC AUTO-ENTMCNC: 32.8 G/DL (ref 30–36.5)
MCV RBC AUTO: 93.9 FL (ref 80–99)
NRBC # BLD: 0 K/UL (ref 0–0.01)
NRBC BLD-RTO: 0 PER 100 WBC
PLATELET # BLD AUTO: 149 K/UL (ref 150–400)
PMV BLD AUTO: 11.2 FL (ref 8.9–12.9)
POTASSIUM SERPL-SCNC: 3.7 MMOL/L (ref 3.5–5.1)
RBC # BLD AUTO: 3.8 M/UL (ref 3.8–5.2)
SODIUM SERPL-SCNC: 139 MMOL/L (ref 136–145)
SPECIMEN EXP DATE BLD: NORMAL
WBC # BLD AUTO: 9 K/UL (ref 3.6–11)

## 2024-03-10 PROCEDURE — 2709999900 HC NON-CHARGEABLE SUPPLY: Performed by: ORTHOPAEDIC SURGERY

## 2024-03-10 PROCEDURE — 7100000001 HC PACU RECOVERY - ADDTL 15 MIN: Performed by: ORTHOPAEDIC SURGERY

## 2024-03-10 PROCEDURE — 93010 ELECTROCARDIOGRAM REPORT: CPT | Performed by: SPECIALIST

## 2024-03-10 PROCEDURE — 2700000000 HC OXYGEN THERAPY PER DAY

## 2024-03-10 PROCEDURE — 6360000002 HC RX W HCPCS: Performed by: ORTHOPAEDIC SURGERY

## 2024-03-10 PROCEDURE — 7100000000 HC PACU RECOVERY - FIRST 15 MIN: Performed by: ORTHOPAEDIC SURGERY

## 2024-03-10 PROCEDURE — 73502 X-RAY EXAM HIP UNI 2-3 VIEWS: CPT

## 2024-03-10 PROCEDURE — 94761 N-INVAS EAR/PLS OXIMETRY MLT: CPT

## 2024-03-10 PROCEDURE — 3700000000 HC ANESTHESIA ATTENDED CARE: Performed by: ORTHOPAEDIC SURGERY

## 2024-03-10 PROCEDURE — 1100000000 HC RM PRIVATE

## 2024-03-10 PROCEDURE — 6370000000 HC RX 637 (ALT 250 FOR IP): Performed by: HOSPITALIST

## 2024-03-10 PROCEDURE — 36415 COLL VENOUS BLD VENIPUNCTURE: CPT

## 2024-03-10 PROCEDURE — 2580000003 HC RX 258: Performed by: NURSE ANESTHETIST, CERTIFIED REGISTERED

## 2024-03-10 PROCEDURE — 2580000003 HC RX 258: Performed by: ORTHOPAEDIC SURGERY

## 2024-03-10 PROCEDURE — 86901 BLOOD TYPING SEROLOGIC RH(D): CPT

## 2024-03-10 PROCEDURE — 85027 COMPLETE CBC AUTOMATED: CPT

## 2024-03-10 PROCEDURE — 3600000013 HC SURGERY LEVEL 3 ADDTL 15MIN: Performed by: ORTHOPAEDIC SURGERY

## 2024-03-10 PROCEDURE — 83735 ASSAY OF MAGNESIUM: CPT

## 2024-03-10 PROCEDURE — 86850 RBC ANTIBODY SCREEN: CPT

## 2024-03-10 PROCEDURE — 3600000003 HC SURGERY LEVEL 3 BASE: Performed by: ORTHOPAEDIC SURGERY

## 2024-03-10 PROCEDURE — 86900 BLOOD TYPING SEROLOGIC ABO: CPT

## 2024-03-10 PROCEDURE — 80048 BASIC METABOLIC PNL TOTAL CA: CPT

## 2024-03-10 PROCEDURE — 2500000003 HC RX 250 WO HCPCS: Performed by: NURSE ANESTHETIST, CERTIFIED REGISTERED

## 2024-03-10 PROCEDURE — 6360000002 HC RX W HCPCS: Performed by: NURSE ANESTHETIST, CERTIFIED REGISTERED

## 2024-03-10 PROCEDURE — C1769 GUIDE WIRE: HCPCS | Performed by: ORTHOPAEDIC SURGERY

## 2024-03-10 PROCEDURE — 3700000001 HC ADD 15 MINUTES (ANESTHESIA): Performed by: ORTHOPAEDIC SURGERY

## 2024-03-10 PROCEDURE — 0QS634Z REPOSITION RIGHT UPPER FEMUR WITH INTERNAL FIXATION DEVICE, PERCUTANEOUS APPROACH: ICD-10-PCS | Performed by: ORTHOPAEDIC SURGERY

## 2024-03-10 PROCEDURE — C1713 ANCHOR/SCREW BN/BN,TIS/BN: HCPCS | Performed by: ORTHOPAEDIC SURGERY

## 2024-03-10 DEVICE — CANNULATED SCREW
Type: IMPLANTABLE DEVICE | Site: HIP | Status: FUNCTIONAL
Brand: ASNIS

## 2024-03-10 RX ORDER — ONDANSETRON 2 MG/ML
4 INJECTION INTRAMUSCULAR; INTRAVENOUS EVERY 6 HOURS PRN
Status: DISCONTINUED | OUTPATIENT
Start: 2024-03-10 | End: 2024-03-13 | Stop reason: HOSPADM

## 2024-03-10 RX ORDER — CEFAZOLIN SODIUM 1 G/3ML
INJECTION, POWDER, FOR SOLUTION INTRAMUSCULAR; INTRAVENOUS PRN
Status: DISCONTINUED | OUTPATIENT
Start: 2024-03-10 | End: 2024-03-10 | Stop reason: SDUPTHER

## 2024-03-10 RX ORDER — ONDANSETRON 2 MG/ML
4 INJECTION INTRAMUSCULAR; INTRAVENOUS
Status: DISCONTINUED | OUTPATIENT
Start: 2024-03-10 | End: 2024-03-10 | Stop reason: HOSPADM

## 2024-03-10 RX ORDER — SODIUM CHLORIDE 0.9 % (FLUSH) 0.9 %
5-40 SYRINGE (ML) INJECTION EVERY 12 HOURS SCHEDULED
Status: DISCONTINUED | OUTPATIENT
Start: 2024-03-10 | End: 2024-03-13 | Stop reason: HOSPADM

## 2024-03-10 RX ORDER — ROCURONIUM BROMIDE 10 MG/ML
INJECTION, SOLUTION INTRAVENOUS PRN
Status: DISCONTINUED | OUTPATIENT
Start: 2024-03-10 | End: 2024-03-10 | Stop reason: SDUPTHER

## 2024-03-10 RX ORDER — LIDOCAINE HYDROCHLORIDE 20 MG/ML
INJECTION, SOLUTION EPIDURAL; INFILTRATION; INTRACAUDAL; PERINEURAL PRN
Status: DISCONTINUED | OUTPATIENT
Start: 2024-03-10 | End: 2024-03-10 | Stop reason: SDUPTHER

## 2024-03-10 RX ORDER — ONDANSETRON 4 MG/1
4 TABLET, ORALLY DISINTEGRATING ORAL EVERY 8 HOURS PRN
Status: DISCONTINUED | OUTPATIENT
Start: 2024-03-10 | End: 2024-03-13 | Stop reason: HOSPADM

## 2024-03-10 RX ORDER — ONDANSETRON 2 MG/ML
INJECTION INTRAMUSCULAR; INTRAVENOUS PRN
Status: DISCONTINUED | OUTPATIENT
Start: 2024-03-10 | End: 2024-03-10 | Stop reason: SDUPTHER

## 2024-03-10 RX ORDER — SODIUM CHLORIDE 9 MG/ML
INJECTION, SOLUTION INTRAVENOUS PRN
Status: DISCONTINUED | OUTPATIENT
Start: 2024-03-10 | End: 2024-03-13 | Stop reason: HOSPADM

## 2024-03-10 RX ORDER — SODIUM CHLORIDE, SODIUM LACTATE, POTASSIUM CHLORIDE, CALCIUM CHLORIDE 600; 310; 30; 20 MG/100ML; MG/100ML; MG/100ML; MG/100ML
INJECTION, SOLUTION INTRAVENOUS CONTINUOUS
Status: DISCONTINUED | OUTPATIENT
Start: 2024-03-10 | End: 2024-03-13 | Stop reason: HOSPADM

## 2024-03-10 RX ORDER — EPHEDRINE SULFATE/0.9% NACL/PF 50 MG/5 ML
SYRINGE (ML) INTRAVENOUS PRN
Status: DISCONTINUED | OUTPATIENT
Start: 2024-03-10 | End: 2024-03-10 | Stop reason: SDUPTHER

## 2024-03-10 RX ORDER — PROPOFOL 10 MG/ML
INJECTION, EMULSION INTRAVENOUS PRN
Status: DISCONTINUED | OUTPATIENT
Start: 2024-03-10 | End: 2024-03-10 | Stop reason: SDUPTHER

## 2024-03-10 RX ORDER — DIPHENHYDRAMINE HYDROCHLORIDE 50 MG/ML
12.5 INJECTION INTRAMUSCULAR; INTRAVENOUS
Status: DISCONTINUED | OUTPATIENT
Start: 2024-03-10 | End: 2024-03-10 | Stop reason: HOSPADM

## 2024-03-10 RX ORDER — SODIUM CHLORIDE, SODIUM LACTATE, POTASSIUM CHLORIDE, CALCIUM CHLORIDE 600; 310; 30; 20 MG/100ML; MG/100ML; MG/100ML; MG/100ML
INJECTION, SOLUTION INTRAVENOUS CONTINUOUS PRN
Status: DISCONTINUED | OUTPATIENT
Start: 2024-03-10 | End: 2024-03-10 | Stop reason: SDUPTHER

## 2024-03-10 RX ORDER — ENOXAPARIN SODIUM 100 MG/ML
40 INJECTION SUBCUTANEOUS DAILY
Status: DISCONTINUED | OUTPATIENT
Start: 2024-03-10 | End: 2024-03-13 | Stop reason: HOSPADM

## 2024-03-10 RX ORDER — ASPIRIN 81 MG/1
81 TABLET, CHEWABLE ORAL DAILY
Status: DISCONTINUED | OUTPATIENT
Start: 2024-03-11 | End: 2024-03-13 | Stop reason: HOSPADM

## 2024-03-10 RX ORDER — SODIUM CHLORIDE 0.9 % (FLUSH) 0.9 %
5-40 SYRINGE (ML) INJECTION PRN
Status: DISCONTINUED | OUTPATIENT
Start: 2024-03-10 | End: 2024-03-13 | Stop reason: HOSPADM

## 2024-03-10 RX ORDER — FENTANYL CITRATE 50 UG/ML
INJECTION, SOLUTION INTRAMUSCULAR; INTRAVENOUS PRN
Status: DISCONTINUED | OUTPATIENT
Start: 2024-03-10 | End: 2024-03-10 | Stop reason: SDUPTHER

## 2024-03-10 RX ORDER — SUCCINYLCHOLINE/SOD CL,ISO/PF 100 MG/5ML
SYRINGE (ML) INTRAVENOUS PRN
Status: DISCONTINUED | OUTPATIENT
Start: 2024-03-10 | End: 2024-03-10 | Stop reason: SDUPTHER

## 2024-03-10 RX ORDER — NALOXONE HYDROCHLORIDE 0.4 MG/ML
INJECTION, SOLUTION INTRAMUSCULAR; INTRAVENOUS; SUBCUTANEOUS PRN
Status: DISCONTINUED | OUTPATIENT
Start: 2024-03-10 | End: 2024-03-10 | Stop reason: HOSPADM

## 2024-03-10 RX ORDER — SODIUM CHLORIDE 9 MG/ML
INJECTION, SOLUTION INTRAVENOUS CONTINUOUS
Status: DISCONTINUED | OUTPATIENT
Start: 2024-03-10 | End: 2024-03-13 | Stop reason: HOSPADM

## 2024-03-10 RX ORDER — CLOPIDOGREL BISULFATE 75 MG/1
75 TABLET ORAL DAILY
Status: DISCONTINUED | OUTPATIENT
Start: 2024-03-11 | End: 2024-03-13 | Stop reason: HOSPADM

## 2024-03-10 RX ORDER — DEXAMETHASONE SODIUM PHOSPHATE 4 MG/ML
INJECTION, SOLUTION INTRA-ARTICULAR; INTRALESIONAL; INTRAMUSCULAR; INTRAVENOUS; SOFT TISSUE PRN
Status: DISCONTINUED | OUTPATIENT
Start: 2024-03-10 | End: 2024-03-10 | Stop reason: SDUPTHER

## 2024-03-10 RX ADMIN — PHENYLEPHRINE HYDROCHLORIDE 80 MCG: 10 INJECTION INTRAVENOUS at 12:20

## 2024-03-10 RX ADMIN — PHENYLEPHRINE HYDROCHLORIDE 80 MCG: 10 INJECTION INTRAVENOUS at 12:26

## 2024-03-10 RX ADMIN — Medication 10 MG: at 12:20

## 2024-03-10 RX ADMIN — PHENYLEPHRINE HYDROCHLORIDE 80 MCG: 10 INJECTION INTRAVENOUS at 12:32

## 2024-03-10 RX ADMIN — PROPOFOL 100 MG: 10 INJECTION, EMULSION INTRAVENOUS at 11:55

## 2024-03-10 RX ADMIN — Medication 10 MG: at 12:07

## 2024-03-10 RX ADMIN — METOPROLOL TARTRATE 25 MG: 25 TABLET, FILM COATED ORAL at 20:49

## 2024-03-10 RX ADMIN — LEVOTHYROXINE SODIUM 75 MCG: 0.07 TABLET ORAL at 06:47

## 2024-03-10 RX ADMIN — PHENYLEPHRINE HYDROCHLORIDE 80 MCG: 10 INJECTION INTRAVENOUS at 12:53

## 2024-03-10 RX ADMIN — PROPOFOL 40 MCG/KG/MIN: 10 INJECTION, EMULSION INTRAVENOUS at 12:18

## 2024-03-10 RX ADMIN — ROCURONIUM BROMIDE 25 MG: 10 INJECTION, SOLUTION INTRAVENOUS at 12:00

## 2024-03-10 RX ADMIN — ENOXAPARIN SODIUM 40 MG: 100 INJECTION SUBCUTANEOUS at 14:58

## 2024-03-10 RX ADMIN — SUGAMMADEX 120 MG: 100 INJECTION, SOLUTION INTRAVENOUS at 12:47

## 2024-03-10 RX ADMIN — ONDANSETRON 4 MG: 2 INJECTION INTRAMUSCULAR; INTRAVENOUS at 12:29

## 2024-03-10 RX ADMIN — Medication 80 MG: at 11:55

## 2024-03-10 RX ADMIN — ROSUVASTATIN CALCIUM 10 MG: 10 TABLET, COATED ORAL at 14:56

## 2024-03-10 RX ADMIN — SODIUM CHLORIDE, PRESERVATIVE FREE 10 ML: 5 INJECTION INTRAVENOUS at 21:00

## 2024-03-10 RX ADMIN — ROCURONIUM BROMIDE 5 MG: 10 INJECTION, SOLUTION INTRAVENOUS at 11:54

## 2024-03-10 RX ADMIN — ACETAMINOPHEN 650 MG: 325 TABLET ORAL at 06:47

## 2024-03-10 RX ADMIN — LIDOCAINE HYDROCHLORIDE 40 MG: 20 INJECTION, SOLUTION EPIDURAL; INFILTRATION; INTRACAUDAL; PERINEURAL at 11:54

## 2024-03-10 RX ADMIN — SODIUM CHLORIDE, POTASSIUM CHLORIDE, SODIUM LACTATE AND CALCIUM CHLORIDE: 600; 310; 30; 20 INJECTION, SOLUTION INTRAVENOUS at 11:44

## 2024-03-10 RX ADMIN — FENTANYL CITRATE 50 MCG: 50 INJECTION, SOLUTION INTRAMUSCULAR; INTRAVENOUS at 11:47

## 2024-03-10 RX ADMIN — WATER 2000 MG: 1 INJECTION INTRAMUSCULAR; INTRAVENOUS; SUBCUTANEOUS at 20:44

## 2024-03-10 RX ADMIN — ACETAMINOPHEN 650 MG: 325 TABLET ORAL at 14:56

## 2024-03-10 RX ADMIN — FENTANYL CITRATE 50 MCG: 50 INJECTION, SOLUTION INTRAMUSCULAR; INTRAVENOUS at 11:52

## 2024-03-10 RX ADMIN — Medication 1 TABLET: at 20:43

## 2024-03-10 RX ADMIN — PHENYLEPHRINE HYDROCHLORIDE 120 MCG: 10 INJECTION INTRAVENOUS at 12:44

## 2024-03-10 RX ADMIN — SENNOSIDES AND DOCUSATE SODIUM 1 TABLET: 50; 8.6 TABLET ORAL at 20:44

## 2024-03-10 RX ADMIN — ACETAMINOPHEN 650 MG: 325 TABLET ORAL at 00:12

## 2024-03-10 RX ADMIN — ACETAMINOPHEN 650 MG: 325 TABLET ORAL at 23:17

## 2024-03-10 RX ADMIN — SODIUM CHLORIDE: 9 INJECTION, SOLUTION INTRAVENOUS at 14:55

## 2024-03-10 RX ADMIN — PHENYLEPHRINE HYDROCHLORIDE 80 MCG: 10 INJECTION INTRAVENOUS at 12:07

## 2024-03-10 RX ADMIN — LIDOCAINE HYDROCHLORIDE 20 MG: 20 INJECTION, SOLUTION EPIDURAL; INFILTRATION; INTRACAUDAL; PERINEURAL at 12:58

## 2024-03-10 RX ADMIN — PHENYLEPHRINE HYDROCHLORIDE 80 MCG: 10 INJECTION INTRAVENOUS at 12:04

## 2024-03-10 RX ADMIN — CEFAZOLIN 2 G: 1 INJECTION, POWDER, FOR SOLUTION INTRAMUSCULAR; INTRAVENOUS at 12:05

## 2024-03-10 RX ADMIN — DEXAMETHASONE SODIUM PHOSPHATE 8 MG: 4 INJECTION INTRA-ARTICULAR; INTRALESIONAL; INTRAMUSCULAR; INTRAVENOUS; SOFT TISSUE at 12:02

## 2024-03-10 ASSESSMENT — PAIN - FUNCTIONAL ASSESSMENT
PAIN_FUNCTIONAL_ASSESSMENT: 0-10
PAIN_FUNCTIONAL_ASSESSMENT: NONE - DENIES PAIN

## 2024-03-10 ASSESSMENT — PAIN DESCRIPTION - ORIENTATION
ORIENTATION: RIGHT
ORIENTATION: RIGHT

## 2024-03-10 ASSESSMENT — PAIN DESCRIPTION - DESCRIPTORS
DESCRIPTORS: ACHING
DESCRIPTORS: DULL

## 2024-03-10 ASSESSMENT — PAIN SCALES - GENERAL
PAINLEVEL_OUTOF10: 4
PAINLEVEL_OUTOF10: 0
PAINLEVEL_OUTOF10: 1

## 2024-03-10 ASSESSMENT — PAIN DESCRIPTION - LOCATION
LOCATION: KNEE
LOCATION: HIP

## 2024-03-10 NOTE — PLAN OF CARE
Problem: Safety - Adult  Goal: Free from fall injury  Outcome: Not Progressing   Pt still remains a high fall risk; pt will remain bed rest until surgery   Problem: ABCDS Injury Assessment  Goal: Absence of physical injury  Outcome: Not Progressing   Pt still has fractured hip until surgery   Problem: Skin/Tissue Integrity  Goal: Absence of new skin breakdown  Description: 1.  Monitor for areas of redness and/or skin breakdown  2.  Assess vascular access sites hourly  3.  Every 4-6 hours minimum:  Change oxygen saturation probe site  4.  Every 4-6 hours:  If on nasal continuous positive airway pressure, respiratory therapy assess nares and determine need for appliance change or resting period.  Outcome: Progressing  No new skin breakdown  Problem: Chronic Conditions and Co-morbidities  Goal: Patient's chronic conditions and co-morbidity symptoms are monitored and maintained or improved  Outcome: Progressing   Pt hasn't had any issues with her pacemaker or any other issues associated with past medical history. Pt is still waiting on tele box.      Problem: Pain  Goal: Verbalizes/displays adequate comfort level or baseline comfort level  Outcome: Progressing   Pt pain was managed throughout the night. Pt also used personal methods for pain management such as a towel under affected leg. Pt expressed no need for pain meds outside of what was scheduled.

## 2024-03-10 NOTE — PROGRESS NOTES
TRANSFER - OUT REPORT:    Verbal report given to TRAVON Chen on Dorinda Olivares  being transferred to [facility] for routine progression of patient care       Report consisted of patient's Situation, Background, Assessment and   Recommendations(SBAR).     Information from the following report(s) Nurse Handoff Report, Surgery Report, and Cardiac Rhythm Paced  was reviewed with the receiving nurse.           Lines:   Peripheral IV 03/08/24 Left Antecubital (Active)   Site Assessment Clean, dry & intact 03/10/24 1322   Line Status Infusing 03/10/24 1322   Line Care Connections checked and tightened 03/10/24 1322   Phlebitis Assessment No symptoms 03/10/24 1322   Infiltration Assessment 0 03/10/24 1322   Alcohol Cap Used Yes 03/10/24 1322   Dressing Status Clean, dry & intact 03/10/24 1322   Dressing Type Transparent 03/10/24 1322   Dressing Intervention New 03/08/24 2008        Opportunity for questions and clarification was provided.      Patient transported with:  Registered Nurse

## 2024-03-10 NOTE — OP NOTE
Operative Note      Patient: Dorinda Olivares  YOB: 1950  MRN: [id number]    Date of Procedure: 3/10/2024    Pre-Op Diagnosis Codes:     * Other fracture of right femur, initial encounter for closed fracture (HCC) [S72.8X1A]    Post-Op Diagnosis: Post-Op Diagnosis Codes:     * Other fracture of right femur, initial encounter for closed fracture (HCC) [S72.8X1A]       OPERATIVE PROCEDURE:   Right Closed reduction with percutaneous pinning of hip - CPT 30256   Fluoroscopy, directed and interpreted by surgeon - CPT 35456    Surgeon(s):  Nuha Whitehead MD    Assistant:   Surgical Assistant: Juan C Alves    Anesthesia: General    Estimated Blood Loss (mL): less than 100     Complications: None    Specimens:   * No specimens in log *    Implants:  [place] 6.5 mm partial treaded cannulated screws x 3 (2 - 85, 1 - 90)      Drains: * No LDAs found *    FLUIDS: Please see anesthesia record    INDICATIONS FOR PROCEDURE: This patient is a patient who presented to our institution after a GLF. Radiographs were obtained which demonstrated an non displaced femoral neck fracture. I discussed with the patient the nature of the diagnosis as well as treatment options, and that I would recommend stabilization. I discussed with them the risks, benefits, and alternatives to surgical treatment. The patient had a good understanding of these risks, and gave informed consent to proceed with the surgery as outlined above. The patient presents here today for that operative procedure.    DETAILED DESCRIPTION OF PROCEDURE: The patient was identified in the preoperative holding area. The operative extremity was marked. The patient was taken back to the operating room table where anesthesia was induced. The patient was placed supine on the fracture table with all bony prominences well padded. The operative boots were placed and the perineal post was placed. We brought in fluoroscopy where AP and lateral were obtained. Appropriate  reduction maneuvers were undertaken to obtain what we deemed was a satisfactory reduction. The patient's operative extremity was then prepped and draped in the usual sterile fashion. Appropriate time outs were performed. Preoperative antibiotics were administered.     Before the procedure, xray of the hip was taken to confirm that the fracture was still non displaced after the patient was positioned on a fracture table.    We began by make a 3 cm incision over the lateral femur with a # 10 blade. Initially hemostasis was gained with bovie electrocautery. Then the IT band was split inline with the incision. Then a treaded guidewires was inserted into the proximal femur in a inverted triangle configuration. The inferior wire was the first one inserted. The positioning and length of the wires was confirmed with fluoroscopy. Then 3 x 6.5 mm screws were inserted +/- washers. The guide wires were removed and final fluoroscopy images were taken.     The wounds were irrigated. We closed in layers in the standard fashion with #1 vicryl for the deep fascia, 3-0 vicryl for the subcutaneous layer, and 4-0 Monocryl sutures for the skin. A sterile bandage was placed over each incision. The patient was awakened in stable condition. All counts were correct at the conclusion of the case.     DISPOSITION: Stable to PACU    POSTOPERATIVE PLAN:   - WBAT  - PT/OT eval  - fu post op XR  - Dvt ppx per primary   - Change dressing before discharge  - Care per primary team    FOLLOW UP: We will plan to see the patient back in clinic in approximately 4 weeks for a wound check.     Nuha Whitehead MD       Electronically signed by Nuha Whitehead MD on 3/10/2024 at 12:52 PM

## 2024-03-10 NOTE — PROGRESS NOTES
Daily PRN  0.9 % sodium chloride infusion, , IntraVENous, Continuous  acetaminophen (TYLENOL) tablet 650 mg, 650 mg, Oral, 4 times per day **OR** acetaminophen (TYLENOL) suppository 650 mg, 650 mg, Rectal, 4 times per day  HYDROmorphone (DILAUDID) injection 0.5 mg, 0.5 mg, IntraVENous, Q4H PRN  oxyCODONE (ROXICODONE) immediate release tablet 5 mg, 5 mg, Oral, Q6H PRN       ASSESSMENT and PLAN:     Principal Problem:    Closed right hip fracture, initial encounter ([facility])  Active Problems:    Dizziness  Resolved Problems:    * No resolved hospital problems. *        Acute right femoral neck fracture  Mechanical fall  -Orthopedic surgery consulted.  -Status post closed reduction percutaneous pinning 3/10/2024  -  Cardiology consulted preoperatively.  Intermediate risk stratification.  -Hold dual antiplatelet therapy for surgery, will resume now  --scheduled tylenol q6h.  Oxycodone prn and dilaudid prn  -Consult PT and OT, [facility]    Dizziness  History of syncope  History of complete heart block status post pacemaker in 12/2023  -Pacer interrogation  -Check orthostatics once able to stand  -Gentle hydration  -Obtain echocardiogram, last echo in 2021 showed EF 60 to 65%    Leukocytosis   - WBC 17K on presentation, now normal  --suspect reactive to fracture, no fever, UA normal, CXR no acute process    CAD s/p MI and RCA stent 2010  Peripheral vascular disease status post right superficial femoral artery stent  Cerebrovascular disease with history of stroke  -Resume aspirin and Plavix  --cont crestor, zetia  --continue metoprolol 25mg bid; cont losartan daily prn sbp >170     Hx sarcoma in right leg with mets to right lung  Chronic right foot drop  - S/p wedge resection right lung 2013 by Dr. 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 Owusu  Has pulmonary nodules being monitored by Dr. Owusu at [facility]  --fu with Dr. Owusu      Hepatomegaly on CT pelvis  Has hepatic lobe hemangioma on [facility] CT chest  --LFTs normal.  FU with PCP and Dr. Owusu     Hypothyroid s/p partial thyroidectomy  --continue levothyroxine       DVT ppx:  lovenox    Dispo:  [facility] vs SNF, [facility]      CODE STATUS:  FULL CODE        Total time spent with patient care: 35 min, critical care time (excluding procedures):  [ ] yes  [x] no.  I personally saw and examined the patient during this time period.                                                                                                                 Care Plan discussed with: Patient, nurse    Discussed:  Care Plan         Procedures: see electronic medical records for all procedures/Xrays and details which were not copied into this note but were reviewed prior to creation of Plan.  I have personally reviewed the radiographs, laboratory data in Epic and decisions and statements above are based partially on this personal interpretation.      Signed: Beny Worley MD

## 2024-03-10 NOTE — CONSULTS
ORTHOPAEDIC FRACTURE CONSULT NOTE    Subjective:     Date of Consultation:  March 9, 2024      Dorinda Olivares is a 73 y.o. female who is being seen for a nondisplaced right femoral neck fracture.  She has a past medical history of a left hip fracture, CAD, CVA,  and ?complete heart block s/p pacemaker placement.  She presented to the ER last night after suffering a ground level fall at home.  She states her son lives with her and since her left hip fracture with CRPP done in 2022 she continues to mobilize, but utilizing a walker.  She reports that this fall was centered around a feeling of dizziness.  She states that since her pacemaker placement in 12/2023 at [facility] she has not felt back to normal.  She has episodic periods of dizziness causing her to stumble and fall.  She states that when she fell this time, she landed on her right side without head trauma.  She was unable to walk and reports severe right hip pain with movement.  She has a history of right lower leg sarcoma with subsequent resection and lung mets that required wedge resection.  She states she has foot drop on the right, but wears a supportive shoe and this has never been a reason for her tripping/falling.     Patient Active Problem List    Diagnosis Date Noted    Dizziness 03/09/2024    Closed right hip fracture, initial encounter (HCC) 03/08/2024    Femur fracture ([facility]) 06/24/2022    History of CVA (cerebrovascular accident) 06/24/2022    Thyroid disease     Ill-defined condition     CAD (coronary artery disease)      Family History   Problem Relation Age of Onset    Stroke Father     Other Mother         hydrocephaleus/had shunt    Heart Surgery Father     Coronary Art Dis Father     Hypertension Mother     Coronary Art Dis Mother     Stroke Mother         hemorrhagic stroke    Other Father         subdural hematoma    Other Other         cousin \"never woke up after surgery\" - pt unaware of circumstance    Cancer Other         Distant relative

## 2024-03-11 ENCOUNTER — APPOINTMENT (OUTPATIENT)
Facility: HOSPITAL | Age: 74
DRG: 482 | End: 2024-03-11
Attending: INTERNAL MEDICINE
Payer: MEDICARE

## 2024-03-11 LAB
ANION GAP SERPL CALC-SCNC: 5 MMOL/L (ref 5–15)
BUN SERPL-MCNC: 9 MG/DL (ref 6–20)
BUN/CREAT SERPL: 15 (ref 12–20)
CALCIUM SERPL-MCNC: 8.5 MG/DL (ref 8.5–10.1)
CHLORIDE SERPL-SCNC: 110 MMOL/L (ref 97–108)
CO2 SERPL-SCNC: 25 MMOL/L (ref 21–32)
CREAT SERPL-MCNC: 0.61 MG/DL (ref 0.55–1.02)
ERYTHROCYTE [DISTWIDTH] IN BLOOD BY AUTOMATED COUNT: 12.7 % (ref 11.5–14.5)
GLUCOSE SERPL-MCNC: 122 MG/DL (ref 65–100)
HCT VFR BLD AUTO: 28.6 % (ref 35–47)
HGB BLD-MCNC: 9.5 G/DL (ref 11.5–16)
MAGNESIUM SERPL-MCNC: 2 MG/DL (ref 1.6–2.4)
MCH RBC QN AUTO: 31.7 PG (ref 26–34)
MCHC RBC AUTO-ENTMCNC: 33.2 G/DL (ref 30–36.5)
MCV RBC AUTO: 95.3 FL (ref 80–99)
NRBC # BLD: 0 K/UL (ref 0–0.01)
NRBC BLD-RTO: 0 PER 100 WBC
PLATELET # BLD AUTO: 136 K/UL (ref 150–400)
PMV BLD AUTO: 10.8 FL (ref 8.9–12.9)
POTASSIUM SERPL-SCNC: 4 MMOL/L (ref 3.5–5.1)
RBC # BLD AUTO: 3 M/UL (ref 3.8–5.2)
SODIUM SERPL-SCNC: 140 MMOL/L (ref 136–145)
WBC # BLD AUTO: 8.5 K/UL (ref 3.6–11)

## 2024-03-11 PROCEDURE — 6360000002 HC RX W HCPCS: Performed by: ORTHOPAEDIC SURGERY

## 2024-03-11 PROCEDURE — 36415 COLL VENOUS BLD VENIPUNCTURE: CPT

## 2024-03-11 PROCEDURE — 85027 COMPLETE CBC AUTOMATED: CPT

## 2024-03-11 PROCEDURE — 80048 BASIC METABOLIC PNL TOTAL CA: CPT

## 2024-03-11 PROCEDURE — 97161 PT EVAL LOW COMPLEX 20 MIN: CPT

## 2024-03-11 PROCEDURE — APPNB30 APP NON BILLABLE TIME 0-30 MINS: Performed by: NURSE PRACTITIONER

## 2024-03-11 PROCEDURE — 6370000000 HC RX 637 (ALT 250 FOR IP): Performed by: HOSPITALIST

## 2024-03-11 PROCEDURE — 83735 ASSAY OF MAGNESIUM: CPT

## 2024-03-11 PROCEDURE — 1100000000 HC RM PRIVATE

## 2024-03-11 PROCEDURE — 97165 OT EVAL LOW COMPLEX 30 MIN: CPT

## 2024-03-11 PROCEDURE — 97116 GAIT TRAINING THERAPY: CPT

## 2024-03-11 PROCEDURE — 97530 THERAPEUTIC ACTIVITIES: CPT

## 2024-03-11 PROCEDURE — 6370000000 HC RX 637 (ALT 250 FOR IP): Performed by: NURSE PRACTITIONER

## 2024-03-11 PROCEDURE — 2700000000 HC OXYGEN THERAPY PER DAY

## 2024-03-11 PROCEDURE — 6370000000 HC RX 637 (ALT 250 FOR IP): Performed by: INTERNAL MEDICINE

## 2024-03-11 PROCEDURE — 2580000003 HC RX 258: Performed by: ORTHOPAEDIC SURGERY

## 2024-03-11 PROCEDURE — 97535 SELF CARE MNGMENT TRAINING: CPT

## 2024-03-11 PROCEDURE — 94761 N-INVAS EAR/PLS OXIMETRY MLT: CPT

## 2024-03-11 RX ORDER — LANOLIN ALCOHOL/MO/W.PET/CERES
3 CREAM (GRAM) TOPICAL NIGHTLY PRN
Status: DISCONTINUED | OUTPATIENT
Start: 2024-03-11 | End: 2024-03-13 | Stop reason: HOSPADM

## 2024-03-11 RX ADMIN — EZETIMIBE 10 MG: 10 TABLET ORAL at 09:59

## 2024-03-11 RX ADMIN — CLOPIDOGREL BISULFATE 75 MG: 75 TABLET ORAL at 09:59

## 2024-03-11 RX ADMIN — Medication 1 TABLET: at 09:59

## 2024-03-11 RX ADMIN — SODIUM CHLORIDE: 9 INJECTION, SOLUTION INTRAVENOUS at 13:15

## 2024-03-11 RX ADMIN — SODIUM CHLORIDE, PRESERVATIVE FREE 10 ML: 5 INJECTION INTRAVENOUS at 10:00

## 2024-03-11 RX ADMIN — OXYCODONE 5 MG: 5 TABLET ORAL at 04:18

## 2024-03-11 RX ADMIN — Medication 3 MG: at 22:47

## 2024-03-11 RX ADMIN — Medication 1 TABLET: at 21:17

## 2024-03-11 RX ADMIN — SENNOSIDES AND DOCUSATE SODIUM 1 TABLET: 50; 8.6 TABLET ORAL at 21:17

## 2024-03-11 RX ADMIN — ASPIRIN 81 MG: 81 TABLET, CHEWABLE ORAL at 09:59

## 2024-03-11 RX ADMIN — METOPROLOL TARTRATE 25 MG: 25 TABLET, FILM COATED ORAL at 21:16

## 2024-03-11 RX ADMIN — SODIUM CHLORIDE: 9 INJECTION, SOLUTION INTRAVENOUS at 01:20

## 2024-03-11 RX ADMIN — SODIUM CHLORIDE, PRESERVATIVE FREE 10 ML: 5 INJECTION INTRAVENOUS at 21:17

## 2024-03-11 RX ADMIN — ACETAMINOPHEN 650 MG: 325 TABLET ORAL at 18:21

## 2024-03-11 RX ADMIN — WATER 2000 MG: 1 INJECTION INTRAMUSCULAR; INTRAVENOUS; SUBCUTANEOUS at 09:59

## 2024-03-11 RX ADMIN — ENOXAPARIN SODIUM 40 MG: 100 INJECTION SUBCUTANEOUS at 09:59

## 2024-03-11 RX ADMIN — SENNOSIDES AND DOCUSATE SODIUM 1 TABLET: 50; 8.6 TABLET ORAL at 09:59

## 2024-03-11 RX ADMIN — ROSUVASTATIN CALCIUM 10 MG: 10 TABLET, COATED ORAL at 15:55

## 2024-03-11 RX ADMIN — ACETAMINOPHEN 650 MG: 325 TABLET ORAL at 22:47

## 2024-03-11 RX ADMIN — ACETAMINOPHEN 650 MG: 325 TABLET ORAL at 07:37

## 2024-03-11 RX ADMIN — LEVOTHYROXINE SODIUM 75 MCG: 0.07 TABLET ORAL at 07:37

## 2024-03-11 RX ADMIN — ACETAMINOPHEN 650 MG: 325 TABLET ORAL at 12:14

## 2024-03-11 ASSESSMENT — PAIN DESCRIPTION - LOCATION
LOCATION: ABDOMEN
LOCATION: FOOT;KNEE

## 2024-03-11 ASSESSMENT — PAIN SCALES - GENERAL
PAINLEVEL_OUTOF10: 4
PAINLEVEL_OUTOF10: 1
PAINLEVEL_OUTOF10: 1

## 2024-03-11 ASSESSMENT — PAIN DESCRIPTION - ORIENTATION: ORIENTATION: RIGHT

## 2024-03-11 ASSESSMENT — PAIN DESCRIPTION - DESCRIPTORS: DESCRIPTORS: ACHING

## 2024-03-11 NOTE — PLAN OF CARE
Problem: Skin/Tissue Integrity  Goal: Absence of new skin breakdown  Description: 1.  Monitor for areas of redness and/or skin breakdown  2.  Assess vascular access sites hourly  3.  Every 4-6 hours minimum:  Change oxygen saturation probe site  4.  Every 4-6 hours:  If on nasal continuous positive airway pressure, respiratory therapy assess nares and determine need for appliance change or resting period.  Outcome: Progressing     Problem: Safety - Adult  Goal: Free from fall injury  Outcome: Progressing     Problem: ABCDS Injury Assessment  Goal: Absence of physical injury  Outcome: Progressing     Problem: Chronic Conditions and Co-morbidities  Goal: Patient's chronic conditions and co-morbidity symptoms are monitored and maintained or improved  Outcome: Progressing   Pt pacemaker and cardiac functions are normal, no tele order for pt.   Problem: Discharge Planning  Goal: Discharge to home or other facility with appropriate resources  Outcome: Progressing     Problem: Pain  Goal: Verbalizes/displays adequate comfort level or baseline comfort level  Outcome: Progressing

## 2024-03-11 NOTE — PLAN OF CARE
Problem: Skin/Tissue Integrity  Goal: Absence of new skin breakdown  Description: 1.  Monitor for areas of redness and/or skin breakdown  2.  Assess vascular access sites hourly  3.  Every 4-6 hours minimum:  Change oxygen saturation probe site  4.  Every 4-6 hours:  If on nasal continuous positive airway pressure, respiratory therapy assess nares and determine need for appliance change or resting period.  3/11/2024 1336 by Svetlana Centeno RN  Outcome: HH/HSPC Progressing  3/11/2024 0409 by Allyson Louise LPN  Outcome: Progressing     Problem: Safety - Adult  Goal: Free from fall injury  3/11/2024 1336 by Svetlana Centeno RN  Outcome: HH/HSPC Progressing  3/11/2024 0409 by Allyson Louise LPN  Outcome: Progressing     Problem: ABCDS Injury Assessment  Goal: Absence of physical injury  3/11/2024 1336 by Svetlana Centeno RN  Outcome: /HSPC Progressing  3/11/2024 0409 by Allyson Louise LPN  Outcome: Progressing     Problem: Chronic Conditions and Co-morbidities  Goal: Patient's chronic conditions and co-morbidity symptoms are monitored and maintained or improved  3/11/2024 1336 by Svetlana Centeno RN  Outcome: HH/HSPC Progressing  3/11/2024 0409 by Allyson Louise LPN  Outcome: Progressing     Problem: Discharge Planning  Goal: Discharge to home or other facility with appropriate resources  3/11/2024 1336 by Svetlana Centeno RN  Outcome: /HSPC Progressing  3/11/2024 0409 by Allyson Louise LPN  Outcome: Progressing     Problem: Pain  Goal: Verbalizes/displays adequate comfort level or baseline comfort level  3/11/2024 1336 by Svetlana Centeno, RN  Outcome: HH/HSPC Progressing  3/11/2024 0409 by Allyson Louise LPN  Outcome: Progressing

## 2024-03-11 NOTE — PLAN OF CARE
Problem: Physical Therapy - Adult  Goal: By Discharge: Performs mobility at highest level of function for planned discharge setting.  See evaluation for individualized goals.  Description: FUNCTIONAL STATUS PRIOR TO ADMISSION: Patient was independent and active without use of DME. Pt with baseline R foot drop and does not wear a brace    HOME SUPPORT PRIOR TO ADMISSION: The patient lived with her son but did not require assistance.    Physical Therapy Goals  Initiated 3/11/2024  1.  Patient will move from supine to sit and sit to supine and roll side to side in bed with supervision/set-up within 7 day(s).    2.  Patient will perform sit to stand with contact guard assist within 7 day(s).  3.  Patient will transfer from bed to chair and chair to bed with contact guard assist using the least restrictive device within 7 day(s).  4.  Patient will ambulate with contact guard assist for 50 feet with the least restrictive device within 7 day(s).   5.  Patient will ascend/descend 10 stairs with single handrail(s) with minimal assistance within 7 day(s).    Outcome: Progressing   PHYSICAL THERAPY EVALUATION    Patient: Dorinda Olivares (73 y.o. female)  Date: 3/11/2024  Primary Diagnosis: Closed fracture of neck of left femur, initial encounter ([facility]) [S72.002A]  Closed right hip fracture, initial encounter ([facility]) [S72.001A]  Procedure(s) (LRB):  RIGHT HIP PINNING (Right) 1 Day Post-Op   Precautions: Restrictions/Precautions: Fall Risk (WBAT RLE)                      ASSESSMENT :   DEFICITS/IMPAIRMENTS:   The patient is limited by decreased functional mobility, ROM, strength, balance, increased pain levels, gait, increased risk for recurrent falls s/p admission on 3/8 with a GLF resulting in R hip fx. Pt s/p R hip pinning, POD #1.      Based on the impairments listed above pt required CGA, use of bed rails with HOB elevated and verbal cueing for technique to complete supine to sit EOB. Pt performed sit<>stands with RW with min  Mary Carmen WOOD, Ruiz Myers. Activity Measure for Post-Acute Care \"6-Clicks\" Basic Mobility Scores Predict Discharge Destination After Acute Care Hospitalization in Select Patient Groups: A Retrospective, Observational Study. Arch Rehabil Res Clin Transl. 2022;4(3):234329. doi: 10.1016/[email or web address]. PMID: 65451318; PMCID: [id number].  4. Jd NEGRETE, Morelia S, Melissa W, Nae P. AM-PAC Short Forms Manual 4.0. Revised 2020.                                                                                                                                                                                                                               Pain Ratin/10   Pain Intervention(s):   patient medicated for pain prior to session, ice, and repositioning    Activity Tolerance:   Good     Pulse BP Patient Position   24 1501 71 (!) 145/56 Sitting   24 1454 81 (!) 152/63 Standing   24 1446 87 (!) 165/63 Sitting   24 1438 74 (!) 149/72 Semi fowlers   24 1142 67 (!) 118/59 Supine         After treatment:   Patient left in no apparent distress sitting up in chair, Call bell within reach, Bed/ chair alarm activated, Caregiver / family present, and Side rails x3    COMMUNICATION/EDUCATION:   The patient's plan of care was discussed with: occupational therapist and registered nurse    Patient Education  Education Given To: Patient;Family  Education Provided: Role of Therapy;Plan of Care  Education Method: Verbal  Barriers to Learning: None  Education Outcome: Verbalized understanding;Continued education needed    Thank you for this referral.  Luda Koch PT, DPT  Minutes: 33

## 2024-03-11 NOTE — PLAN OF CARE
Problem: Occupational Therapy - Adult  Goal: By Discharge: Performs self-care activities at highest level of function for planned discharge setting.  See evaluation for individualized goals.  Description: FUNCTIONAL STATUS PRIOR TO ADMISSION:  independent with ADLs and MOD I with SPC/RW intermittently because of dizziness. Pt has baseline R foot drop. Had a L hip fracture in June 2022 with good recovery (went to [facility] at the time).    ,  ,  ,  ,  ,  ,  ,  ,  ,  ,       HOME SUPPORT: Patient's son lives with her. He works from home. Also has supportive daughter locally.     Occupational Therapy Goals:  Initiated 3/11/2024  1.  Patient will perform grooming, standing at sink, with Supervision within 7 day(s).  2.  Patient will perform lower body dressing with Minimal Assist within 7 day(s).  3.  Patient will perform bathing with Minimal Assist within 7 day(s).  4.  Patient will perform toilet transfers with Supervision  within 7 day(s).  5.  Patient will perform all aspects of toileting with Modified Rock within 7 day(s).  6.  Patient will participate in upper extremity therapeutic exercise/activities with Rock for 10 minutes within 7 day(s).   Outcome: Progressing     OCCUPATIONAL THERAPY EVALUATION    Patient: Dorinda Olivares (73 y.o. female)  Date: 3/11/2024  Primary Diagnosis: Closed fracture of neck of left femur, initial encounter ([facility]) [S72.002A]  Closed right hip fracture, initial encounter ([facility]) [S72.001A]  Procedure(s) (LRB):  RIGHT HIP PINNING (Right) 1 Day Post-Op     Precautions: Fall Risk (WBAT RLE)                  ASSESSMENT :  The patient is limited by decreased functional mobility, independence in ADLs, ROM, strength, activity tolerance, balance following admission for R femoral neck fracture sustained from GLF at home. Pt is now POD 1 s/p R hip pinning and cleared for participation.    Based on the impairments listed above patient presents just below her baseline and is highly    6.  Eating meals? []  1 []  2 []  3 [x]  4   © 2007, Trustees of [facility], under license to [employer]. All rights reserved     Score: 18/24     Interpretation of Tool:  Represents clinically-significant functional categories (i.e. Activities of daily living).    Cutoff score 39.4 (19) correlates to a good likelihood of discharging home versus a facility  Ely Miles, Rashida Alexis, Ramu Suresh, Dorinda Short, Tam Saeed, Conrad Miles, -PAC “6-Clicks” Functional Assessment Scores Predict Acute Care Hospital Discharge Destination, Physical Therapy, Volume 94, Issue 9, 1 September 2014, Pages 0458-6666, [email or web address]    Pain Rating:  Pt reporting 1/10 pain   Pain Intervention(s):   nursing notified and addressing, ice, rest, elevation, and repositioning    Activity Tolerance:   Good    After treatment:   Patient left in no apparent distress sitting up in chair, Call bell within reach, Bed/ chair alarm activated, and Caregiver / family present    COMMUNICATION/EDUCATION:   The patient's plan of care was discussed with: physical therapist and registered nurse    Patient Education  Education Given To: Patient;Family  Education Provided: Role of Therapy;Plan of Care;Transfer Training;Precautions  Education Method: Verbal  Barriers to Learning: None  Education Outcome: Verbalized understanding    Thank you for this referral.  Jessi Ocampo OT  Minutes: 34

## 2024-03-11 NOTE — PROGRESS NOTES
Orthopedic NP Progress Note  Post Op Day: 1 Day Post-Op    March 11, 2024 9:58 AM     Dorinda Olivares    Attending Physician: Treatment Team: Attending Provider: Ana Gonzales MD; Consulting Physician: Nuha Whitehead MD; Consulting Physician: Pat Larkin MD; Surgeon: Nuha Whitehead MD; Registered Nurse: Svetlana Centeno RN; Consulting Provider: Natali Mayorga APRN - NP     Vital Signs:    Patient Vitals for the past 8 hrs:   BP Temp Temp src Pulse Resp SpO2   03/11/24 0821 (!) 108/57 97.3 °F (36.3 °C) Oral 63 16 99 %   03/11/24 0400 109/60 97.7 °F (36.5 °C) Oral 61 16 98 %          Intake/Output:  No intake/output data recorded.  03/09 1901 - 03/11 0700  In: 800 [I.V.:800]  Out: 2800 [Urine:2700]    Pain Control:        LAB:    Recent Labs     03/11/24  0423   HCT 28.6*   HGB 9.5*     Lab Results   Component Value Date/Time     03/11/2024 04:23 AM    K 4.0 03/11/2024 04:23 AM     03/11/2024 04:23 AM    CO2 25 03/11/2024 04:23 AM    BUN 9 03/11/2024 04:23 AM       Subjective:  Dorinda Olivares is a 73 y.o. female s/p a  Procedure(s):  RIGHT HIP PINNING   Procedure(s):  RIGHT HIP PINNING. Tolerating diet. Pain is well tolerated        Objective: General: alert, cooperative, no distress.    Neuro/Vascular: CNS Intact.  Sensation stable. Brisk cap refill, + pulses UE/LE  Musculoskeletal:    RLE - + ROM, expected post op edema, hx of foot drop, + pulses   Skin: warm and dry   Dressing - clean, dry and intact.              PT/OT:   Gait:                      Assessment:    s/p Procedure(s):  RIGHT HIP PINNING    Principal Problem:    Closed right hip fracture, initial encounter ([facility])  Active Problems:    Dizziness  Resolved Problems:    * No resolved hospital problems. *       Plan:   -  PT/OT - WBAT RLE with walker   -  Pain Managment - continue current Pain management regimen as per orders   -  VTE Prophylaxes - TEDS &/or SCDs with lovenox and plavix and ASA a per orders     Discharge  Planning: TBD       Signed By: BRANDON Merritt - NP    Orthopedic Nurse Practitioner

## 2024-03-11 NOTE — PROGRESS NOTES
Hospitalist Progress Note    NAME: Dorinda Olivares   :  1950   MRN:  [id number]     Date/Time:  3/11/2024 3:25 PM    Patient PCP: Kenneth Rosas MD       Subjective:   CHIEF COMPLAINT: Femur fracture       S/p nailing yesterday evening.  Up moving with rehab today.  Pain is controlled.  Feeling better.  Eager to go.  PT and OT are consulted.  Discussed with Ortho at bedside.  No fever.  No complications noted.      Objective:   VITALS:    Vitals:    24 1522   BP: (!) 121/53   Pulse: 70   Resp: 18   Temp: 98.2 °F (36.8 °C)   SpO2: 96%       PHYSICAL EXAM:      General:   No acute distress, resting comfortably in bed.  Heart:  RRR, No MRG  Lungs:  CTAB.  Non-labored breathing.  Abdomen:  Soft .  Nondistended.  NTTP.  BS present.  Extremities:  No edema.  Skin:  No rash  Musculoskeletal: Right hip bandaged.  Neuro:  Alert and interactive.  No focal deficits.  Psych: Mood stable.        LAB DATA REVIEWED:    Recent Results (from the past 24 hour(s))   CBC    Collection Time: 24  4:23 AM   Result Value Ref Range    WBC 8.5 3.6 - 11.0 K/uL    RBC 3.00 (L) 3.80 - 5.20 M/uL    Hemoglobin 9.5 (L) 11.5 - 16.0 g/dL    Hematocrit 28.6 (L) 35.0 - 47.0 %    MCV 95.3 80.0 - 99.0 FL    MCH 31.7 26.0 - 34.0 PG    MCHC 33.2 30.0 - 36.5 g/dL    RDW 12.7 11.5 - 14.5 %    Platelets 136 (L) 150 - 400 K/uL    MPV 10.8 8.9 - 12.9 FL    Nucleated RBCs 0.0 0  WBC    nRBC 0.00 0.00 - 0.01 K/uL   Basic Metabolic Panel    Collection Time: 24  4:23 AM   Result Value Ref Range    Sodium 140 136 - 145 mmol/L    Potassium 4.0 3.5 - 5.1 mmol/L    Chloride 110 (H) 97 - 108 mmol/L    CO2 25 21 - 32 mmol/L    Anion Gap 5 5 - 15 mmol/L    Glucose 122 (H) 65 - 100 mg/dL    BUN 9 6 - 20 MG/DL    Creatinine 0.61 0.55 - 1.02 MG/DL    Bun/Cre Ratio 15 12 - 20      Est, Glom Filt Rate >60 >60 ml/min/1.73m2    Calcium 8.5 8.5 - 10.1 MG/DL   Magnesium    Collection Time: 24  4:23 AM   Result Value Ref Range    Magnesium 2.0  1.6 - 2.4 mg/dL           IMAGING DATA REVIEWED:    XR HIP 2-3 VW W PELVIS RIGHT   Final Result   Routine postoperative changes.            NC XR TECHNOLOGIST SERVICE   Final Result      XR CHEST PORTABLE   Final Result      No acute process on portable chest.         CT PELVIS WO CONTRAST Additional Contrast? None   Final Result   Acute right femoral neck fracture. Incidental hepatomegaly.      XR HIP 2-3 VW W PELVIS RIGHT   Final Result    No definite acute bony abnormalities.  For persistent concern, consider pelvic   CT.         XR FEMUR RIGHT (MIN 2 VIEWS)   Final Result    No definite acute bony abnormalities.  For persistent concern, consider pelvic   CT.                 CURRENT MEDICATION ORDERS:  Current Facility-Administered Medications: lactated ringers IV soln infusion, , IntraVENous, Continuous  sodium chloride flush 0.9 % injection 5-40 mL, 5-40 mL, IntraVENous, 2 times per day  sodium chloride flush 0.9 % injection 5-40 mL, 5-40 mL, IntraVENous, PRN  0.9 % sodium chloride infusion, , IntraVENous, PRN  ondansetron (ZOFRAN-ODT) disintegrating tablet 4 mg, 4 mg, Oral, Q8H PRN **OR** ondansetron (ZOFRAN) injection 4 mg, 4 mg, IntraVENous, Q6H PRN  0.9 % sodium chloride infusion, , IntraVENous, Continuous  enoxaparin (LOVENOX) injection 40 mg, 40 mg, SubCUTAneous, Daily  aspirin chewable tablet 81 mg, 81 mg, Oral, Daily  clopidogrel (PLAVIX) tablet 75 mg, 75 mg, Oral, Daily  ezetimibe (ZETIA) tablet 10 mg, 10 mg, Oral, Daily  levothyroxine (SYNTHROID) tablet 75 mcg, 75 mcg, Oral, QAM AC  metoprolol tartrate (LOPRESSOR) tablet 25 mg, 25 mg, Oral, BID  rosuvastatin (CRESTOR) tablet 10 mg, 10 mg, Oral, Daily before dinner  sennosides-docusate sodium (SENOKOT-S) 8.6-50 MG tablet 1 tablet, 1 tablet, Oral, BID  oyster shell calcium w/D 500-5 MG-MCG tablet 1 tablet, 1 tablet, Oral, BID  losartan (COZAAR) tablet 50 mg, 50 mg, Oral, Daily PRN  potassium chloride (KLOR-CON) extended release tablet 40 mEq, 40 mEq,

## 2024-03-11 NOTE — CARE COORDINATION
3/11/2024  4:29 PM  Care Management Progress Note      ICD-10-CM    1. Closed fracture of neck of left femur, initial encounter ([facility])  S72.002A       2. Dizziness  R42 Echo (TTE) complete (PRN contrast/bubble/strain/3D)     Echo (TTE) complete (PRN contrast/bubble/strain/3D)          RUR:  13%  Risk Level: [x]Low []Moderate []High    Transition of care plan:  Awaiting medical clearance and DC order. Ortho following, and PT/OT treating.   IPR - CM met with pt for assessment and dispo planning. Pt agreeable to IPR. Freedom of Choice offered, and pt indicated [facility] and [facility] as preferences. CM submitted referrals via [employer], and awaiting response.   Outpatient follow-up.  Transport need TBD.        03/11/24 1197   Service Assessment   Patient Orientation Alert and Oriented   Cognition Alert   History Provided By Patient   Primary Caregiver Self   Support Systems Family Members   Patient's Healthcare Decision Maker is: Legal Next of Kin   PCP Verified by CM Yes  (Abhay Powell)   Prior Functional Level Independent in ADLs/IADLs   Current Functional Level Assistance with the following:   Can patient return to prior living arrangement No  (Rehab recommended)   Ability to make needs known: Good   Family able to assist with home care needs: Other (comment)  (Pt's son is able to work from home if goes home with HH.)   Would you like for me to discuss the discharge plan with any other family members/significant others, and if so, who? Yes  (As needed)   Financial Resources Medicare   Community Resources None   Social/Functional History   Lives With Son   Type of Home House   Home Layout Two level;Bed/Bath upstairs   Entrance Stairs - Number of Steps 5   Home Equipment Cane;Walker, standard  (Pt uses no DME at baseline.)   ADL Assistance Independent   Homemaking Assistance Independent   Ambulation Assistance Independent   Transfer Assistance Independent   Active  Yes   Discharge Planning   Type of Residence House   Living

## 2024-03-12 ENCOUNTER — APPOINTMENT (OUTPATIENT)
Facility: HOSPITAL | Age: 74
DRG: 482 | End: 2024-03-12
Attending: INTERNAL MEDICINE
Payer: MEDICARE

## 2024-03-12 LAB
ECHO AO ASC DIAM: 3.1 CM
ECHO AO ASCENDING AORTA INDEX: 1.95 CM/M2
ECHO AV AREA PEAK VELOCITY: 2.1 CM2
ECHO AV AREA VTI: 2.2 CM2
ECHO AV AREA/BSA PEAK VELOCITY: 1.3 CM2/M2
ECHO AV AREA/BSA VTI: 1.4 CM2/M2
ECHO AV MEAN GRADIENT: 5 MMHG
ECHO AV MEAN VELOCITY: 1.1 M/S
ECHO AV PEAK GRADIENT: 9 MMHG
ECHO AV PEAK VELOCITY: 1.5 M/S
ECHO AV VELOCITY RATIO: 0.67
ECHO AV VTI: 32.7 CM
ECHO BSA: 1.61 M2
ECHO LA DIAMETER INDEX: 2.08 CM/M2
ECHO LA DIAMETER: 3.3 CM
ECHO LA VOL A-L A2C: 34 ML (ref 22–52)
ECHO LA VOL A-L A4C: 40 ML (ref 22–52)
ECHO LA VOL BP: 38 ML (ref 22–52)
ECHO LA VOL MOD A2C: 32 ML (ref 22–52)
ECHO LA VOL MOD A4C: 38 ML (ref 22–52)
ECHO LA VOL/BSA BIPLANE: 24 ML/M2 (ref 16–34)
ECHO LA VOLUME AREA LENGTH: 40 ML
ECHO LA VOLUME INDEX A-L A2C: 21 ML/M2 (ref 16–34)
ECHO LA VOLUME INDEX A-L A4C: 25 ML/M2 (ref 16–34)
ECHO LA VOLUME INDEX AREA LENGTH: 25 ML/M2 (ref 16–34)
ECHO LA VOLUME INDEX MOD A2C: 20 ML/M2 (ref 16–34)
ECHO LA VOLUME INDEX MOD A4C: 24 ML/M2 (ref 16–34)
ECHO LV E' LATERAL VELOCITY: 7 CM/S
ECHO LV E' SEPTAL VELOCITY: 8 CM/S
ECHO LV EDV A2C: 47 ML
ECHO LV EDV A4C: 51 ML
ECHO LV EDV BP: 49 ML (ref 56–104)
ECHO LV EDV INDEX A4C: 32 ML/M2
ECHO LV EDV INDEX BP: 31 ML/M2
ECHO LV EDV NDEX A2C: 30 ML/M2
ECHO LV EJECTION FRACTION A2C: 63 %
ECHO LV EJECTION FRACTION A4C: 59 %
ECHO LV EJECTION FRACTION BIPLANE: 61 % (ref 55–100)
ECHO LV ESV A2C: 18 ML
ECHO LV ESV A4C: 21 ML
ECHO LV ESV BP: 19 ML (ref 19–49)
ECHO LV ESV INDEX A2C: 11 ML/M2
ECHO LV ESV INDEX A4C: 13 ML/M2
ECHO LV ESV INDEX BP: 12 ML/M2
ECHO LV FRACTIONAL SHORTENING: 40 % (ref 28–44)
ECHO LV INTERNAL DIMENSION DIASTOLE INDEX: 2.52 CM/M2
ECHO LV INTERNAL DIMENSION DIASTOLIC: 4 CM (ref 3.9–5.3)
ECHO LV INTERNAL DIMENSION SYSTOLIC INDEX: 1.51 CM/M2
ECHO LV INTERNAL DIMENSION SYSTOLIC: 2.4 CM
ECHO LV IVSD: 0.9 CM (ref 0.6–0.9)
ECHO LV MASS 2D: 109.7 G (ref 67–162)
ECHO LV MASS INDEX 2D: 69 G/M2 (ref 43–95)
ECHO LV POSTERIOR WALL DIASTOLIC: 0.9 CM (ref 0.6–0.9)
ECHO LV RELATIVE WALL THICKNESS RATIO: 0.45
ECHO LVOT AREA: 3.1 CM2
ECHO LVOT AV VTI INDEX: 0.71
ECHO LVOT DIAM: 2 CM
ECHO LVOT MEAN GRADIENT: 2 MMHG
ECHO LVOT PEAK GRADIENT: 4 MMHG
ECHO LVOT PEAK VELOCITY: 1 M/S
ECHO LVOT STROKE VOLUME INDEX: 45.6 ML/M2
ECHO LVOT SV: 72.5 ML
ECHO LVOT VTI: 23.1 CM
ECHO MV A VELOCITY: 0.71 M/S
ECHO MV E DECELERATION TIME (DT): 165.9 MS
ECHO MV E VELOCITY: 0.85 M/S
ECHO MV E/A RATIO: 1.2
ECHO MV E/E' LATERAL: 12.14
ECHO MV E/E' RATIO (AVERAGED): 11.38
ECHO PV MAX VELOCITY: 0.9 M/S
ECHO PV PEAK GRADIENT: 3 MMHG
ECHO RV INTERNAL DIMENSION: 3.5 CM
ECHO RV TAPSE: 1.4 CM (ref 1.7–?)
ECHO RVOT PEAK GRADIENT: 1 MMHG
ECHO RVOT PEAK VELOCITY: 0.5 M/S
ECHO TV REGURGITANT MAX VELOCITY: 2.87 M/S
ECHO TV REGURGITANT PEAK GRADIENT: 33 MMHG

## 2024-03-12 PROCEDURE — 2580000003 HC RX 258: Performed by: ORTHOPAEDIC SURGERY

## 2024-03-12 PROCEDURE — 6370000000 HC RX 637 (ALT 250 FOR IP): Performed by: INTERNAL MEDICINE

## 2024-03-12 PROCEDURE — 6370000000 HC RX 637 (ALT 250 FOR IP): Performed by: HOSPITALIST

## 2024-03-12 PROCEDURE — 93306 TTE W/DOPPLER COMPLETE: CPT

## 2024-03-12 PROCEDURE — 6360000002 HC RX W HCPCS: Performed by: ORTHOPAEDIC SURGERY

## 2024-03-12 PROCEDURE — 97110 THERAPEUTIC EXERCISES: CPT

## 2024-03-12 PROCEDURE — 1100000000 HC RM PRIVATE

## 2024-03-12 PROCEDURE — 93306 TTE W/DOPPLER COMPLETE: CPT | Performed by: INTERNAL MEDICINE

## 2024-03-12 PROCEDURE — 6370000000 HC RX 637 (ALT 250 FOR IP): Performed by: NURSE PRACTITIONER

## 2024-03-12 PROCEDURE — 97116 GAIT TRAINING THERAPY: CPT

## 2024-03-12 PROCEDURE — 94761 N-INVAS EAR/PLS OXIMETRY MLT: CPT

## 2024-03-12 PROCEDURE — 99024 POSTOP FOLLOW-UP VISIT: CPT | Performed by: NURSE PRACTITIONER

## 2024-03-12 RX ADMIN — ENOXAPARIN SODIUM 40 MG: 100 INJECTION SUBCUTANEOUS at 09:38

## 2024-03-12 RX ADMIN — ACETAMINOPHEN 650 MG: 325 TABLET ORAL at 06:03

## 2024-03-12 RX ADMIN — ACETAMINOPHEN 650 MG: 325 TABLET ORAL at 17:59

## 2024-03-12 RX ADMIN — SENNOSIDES AND DOCUSATE SODIUM 1 TABLET: 50; 8.6 TABLET ORAL at 20:03

## 2024-03-12 RX ADMIN — SODIUM CHLORIDE, PRESERVATIVE FREE 10 ML: 5 INJECTION INTRAVENOUS at 09:38

## 2024-03-12 RX ADMIN — Medication 1 TABLET: at 20:03

## 2024-03-12 RX ADMIN — ROSUVASTATIN CALCIUM 10 MG: 10 TABLET, COATED ORAL at 15:54

## 2024-03-12 RX ADMIN — ACETAMINOPHEN 650 MG: 325 TABLET ORAL at 12:31

## 2024-03-12 RX ADMIN — Medication 3 MG: at 22:47

## 2024-03-12 RX ADMIN — ASPIRIN 81 MG: 81 TABLET, CHEWABLE ORAL at 09:38

## 2024-03-12 RX ADMIN — Medication 1 TABLET: at 09:38

## 2024-03-12 RX ADMIN — EZETIMIBE 10 MG: 10 TABLET ORAL at 09:38

## 2024-03-12 RX ADMIN — LEVOTHYROXINE SODIUM 75 MCG: 0.07 TABLET ORAL at 06:04

## 2024-03-12 RX ADMIN — SODIUM CHLORIDE, PRESERVATIVE FREE 10 ML: 5 INJECTION INTRAVENOUS at 20:03

## 2024-03-12 RX ADMIN — ACETAMINOPHEN 650 MG: 325 TABLET ORAL at 22:48

## 2024-03-12 RX ADMIN — CLOPIDOGREL BISULFATE 75 MG: 75 TABLET ORAL at 09:38

## 2024-03-12 RX ADMIN — METOPROLOL TARTRATE 25 MG: 25 TABLET, FILM COATED ORAL at 09:38

## 2024-03-12 RX ADMIN — METOPROLOL TARTRATE 25 MG: 25 TABLET, FILM COATED ORAL at 20:02

## 2024-03-12 ASSESSMENT — PAIN DESCRIPTION - LOCATION
LOCATION: ABDOMEN
LOCATION: HIP

## 2024-03-12 ASSESSMENT — PAIN SCALES - GENERAL
PAINLEVEL_OUTOF10: 2
PAINLEVEL_OUTOF10: 2

## 2024-03-12 ASSESSMENT — PAIN DESCRIPTION - ORIENTATION: ORIENTATION: RIGHT

## 2024-03-12 NOTE — PROGRESS NOTES
Hospitalist Progress Note    NAME: Dorinda Olivares   :  1950   MRN:  [id number]     Date/Time:  3/12/2024 8:28 AM    Patient PCP: Kenneth Rosas MD       Subjective:   CHIEF COMPLAINT: Femur fracture       S/p nailing  evening.  Started rehab yesterday.  Recommendation is for acute rehab and [facility]  She states she feels well with minimal pain, but she does have some constipation and gas.  She does not want any simethicone or stool softeners that she states she would like to use fruit and juice.  She has no further complaints today.      Awaiting IPR      Objective:   VITALS:    Vitals:    24 0740   BP: (!) 119/53   Pulse: 67   Resp: 16   Temp: 97.9 °F (36.6 °C)   SpO2: 96%       PHYSICAL EXAM:      General:   No acute distress, resting comfortably in bed.  Heart:  RRR, No MRG  Lungs:  CTAB.  Non-labored breathing.  Abdomen:  Soft .  Nondistended.  NTTP.  BS present.  Extremities:  No edema.  Skin:  No rash  Musculoskeletal: Right hip bandaged.  Neuro:  Alert and interactive.  No focal deficits.  Psych: Mood stable.        LAB DATA REVIEWED:    No results found for this or any previous visit (from the past 24 hour(s)).          IMAGING DATA REVIEWED:    XR HIP 2-3 VW W PELVIS RIGHT   Final Result   Routine postoperative changes.            NC XR TECHNOLOGIST SERVICE   Final Result      XR CHEST PORTABLE   Final Result      No acute process on portable chest.         CT PELVIS WO CONTRAST Additional Contrast? None   Final Result   Acute right femoral neck fracture. Incidental hepatomegaly.      XR HIP 2-3 VW W PELVIS RIGHT   Final Result    No definite acute bony abnormalities.  For persistent concern, consider pelvic   CT.         XR FEMUR RIGHT (MIN 2 VIEWS)   Final Result    No definite acute bony abnormalities.  For persistent concern, consider pelvic   CT.                 CURRENT MEDICATION ORDERS:  Current Facility-Administered Medications: melatonin

## 2024-03-12 NOTE — PROGRESS NOTES
Orthopaedic Progress Note  Post Op day: 2 Days Post-Op    March 12, 2024 4:25 PM     Patient: Dorinda Olivares MRN: [id number]  SSN: [id number]    YOB: 1950  Age: 73 y.o.  Sex: female      Admit date:  3/8/2024  Date of Surgery:  [unfilled]   Procedures:  Procedure(s):  RIGHT HIP PINNING  Admitting Physician:  Pat Larkin MD   Surgeon:  Surgeon(s) and Role:     * Nuha Whitehead MD - Primary    Consulting Physician(s): Treatment Team: Attending Provider: Ana Gonzales MD; Consulting Physician: Nuha Whitehead MD; Consulting Physician: Pat Larkin MD; Surgeon: Nuha Whitehead MD; Consulting Provider: Natali Mayorga APRN - NP; : Lili Khoury; Registered Nurse: Svetlana Centeno RN; Patient Care Tech: Latosha Joe; Occupational Therapist Assistant: Kaitlin Smith OTA          ASSESSMENT / PLAN :   Pain Control : Acceptable pain control overnight.  Limit pain medication if effecting mentation.   Wound or incisional issue : CDI. No swelling or drainage.  Therapy / Weight Bearing Recommendations : WBAT  DVT Prophylaxis : Currently on Lovenox  Disposition : Likely rehab.   Asymptomatic anemia: Will continue to monitor.       SUBJECTIVE:     Dorinda Olivares is a 73 y.o. female is 2 Days Post-Op s/p Procedure(s):  RIGHT HIP PINNING with an appropriate level of post-operative pain.  No complaints of nausea, vomiting, dizziness, lightheadedness, chest pain, or shortness of breath.    OBJECTIVE:       Physical Exam:  General: Alert, cooperative, no distress.    Respiratory: Respirations unlabored  Neurological:  Neurovascular exam within normal limits.  Motor: + DF/PF.   Musculoskeletal: Operative side extremity with swelling as expected. Calves soft, supple, non-tender upon palpation.  +DF/ +PF. DP/ PT +2. SILT with BCR in toes  Dressing/Wound:  Clean, dry and intact. No significant erythema or swelling.      Vital Signs:      Patient Vitals for the past

## 2024-03-12 NOTE — CARE COORDINATION
3/12/2024  10:29 AM  Care Management Progress Note      ICD-10-CM    1. Closed fracture of neck of left femur, initial encounter ([facility])  S72.002A       2. Dizziness  R42 Echo (TTE) complete (PRN contrast/bubble/strain/3D)     Echo (TTE) complete (PRN contrast/bubble/strain/3D)     Echo (TTE) complete (PRN contrast/bubble/strain/3D)     Echo (TTE) complete (PRN contrast/bubble/strain/3D)          RUR:  12%  Risk Level: [x]Low []Moderate []High    Transition of care plan:  Awaiting medical clearance and DC order. PT/OT treating. Ortho following.  IPR - CM received acceptance from [facility] (preference), and they are anticipating a bed tomorrow potentially.   Outpatient follow-up.  Transport need TBD (home vs wheelchair van).

## 2024-03-12 NOTE — PLAN OF CARE
Problem: Skin/Tissue Integrity  Goal: Absence of new skin breakdown  Description: 1.  Monitor for areas of redness and/or skin breakdown  2.  Assess vascular access sites hourly  3.  Every 4-6 hours minimum:  Change oxygen saturation probe site  4.  Every 4-6 hours:  If on nasal continuous positive airway pressure, respiratory therapy assess nares and determine need for appliance change or resting period.  Outcome: HH/HSPC Progressing     Problem: Safety - Adult  Goal: Free from fall injury  Outcome: HH/HSPC Progressing     Problem: ABCDS Injury Assessment  Goal: Absence of physical injury  Outcome: HH/HSPC Progressing     Problem: Chronic Conditions and Co-morbidities  Goal: Patient's chronic conditions and co-morbidity symptoms are monitored and maintained or improved  Outcome: HH/HSPC Progressing     Problem: Discharge Planning  Goal: Discharge to home or other facility with appropriate resources  Outcome: HH/HSPC Progressing     Problem: Pain  Goal: Verbalizes/displays adequate comfort level or baseline comfort level  Outcome: HH/HSPC Progressing     Problem: Physical Therapy - Adult  Goal: By Discharge: Performs mobility at highest level of function for planned discharge setting.  See evaluation for individualized goals.  Description: FUNCTIONAL STATUS PRIOR TO ADMISSION: Patient was independent and active without use of DME. Pt with baseline R foot drop and does not wear a brace    HOME SUPPORT PRIOR TO ADMISSION: The patient lived with her son but did not require assistance.    Physical Therapy Goals  Initiated 3/11/2024  1.  Patient will move from supine to sit and sit to supine and roll side to side in bed with supervision/set-up within 7 day(s).    2.  Patient will perform sit to stand with contact guard assist within 7 day(s).  3.  Patient will transfer from bed to chair and chair to bed with contact guard assist using the least restrictive device within 7 day(s).  4.  Patient will ambulate with contact  guard assist for 50 feet with the least restrictive device within 7 day(s).   5.  Patient will ascend/descend 10 stairs with single handrail(s) with minimal assistance within 7 day(s).    3/12/2024 0932 by Altagracia Painter, PT  Outcome: Progressing

## 2024-03-12 NOTE — PLAN OF CARE
Problem: Physical Therapy - Adult  Goal: By Discharge: Performs mobility at highest level of function for planned discharge setting.  See evaluation for individualized goals.  Description: FUNCTIONAL STATUS PRIOR TO ADMISSION: Patient was independent and active without use of DME. Pt with baseline R foot drop and does not wear a brace    HOME SUPPORT PRIOR TO ADMISSION: The patient lived with her son but did not require assistance.    Physical Therapy Goals  Initiated 3/11/2024  1.  Patient will move from supine to sit and sit to supine and roll side to side in bed with supervision/set-up within 7 day(s).    2.  Patient will perform sit to stand with contact guard assist within 7 day(s).  3.  Patient will transfer from bed to chair and chair to bed with contact guard assist using the least restrictive device within 7 day(s).  4.  Patient will ambulate with contact guard assist for 50 feet with the least restrictive device within 7 day(s).   5.  Patient will ascend/descend 10 stairs with single handrail(s) with minimal assistance within 7 day(s).    Outcome: Progressing   PHYSICAL THERAPY TREATMENT    Patient: Dorinda Olivares (73 y.o. female)  Date: 3/12/2024  Diagnosis: Closed fracture of neck of left femur, initial encounter ([facility]) [S72.002A]  Closed right hip fracture, initial encounter ([facility]) [S72.001A] Closed right hip fracture, initial encounter ([facility])  Procedure(s) (LRB):  RIGHT HIP PINNING (Right) 2 Days Post-Op  Precautions: Fall Risk (WBAT RLE)                      ASSESSMENT:  Patient continues to benefit from skilled PT services and is slowly progressing towards goals. She demonstrates the need for increased time and physical assistance to complete all functional mobility. Patient ambulates to and from bedroom door with one brief standing rest break. She ambulates with increased trunk sway but no overt LOB.          PLAN:  Patient continues to benefit from skilled intervention to address the above

## 2024-03-12 NOTE — PLAN OF CARE
Problem: Occupational Therapy - Adult  Goal: By Discharge: Performs self-care activities at highest level of function for planned discharge setting.  See evaluation for individualized goals.  Description: FUNCTIONAL STATUS PRIOR TO ADMISSION:  independent with ADLs and MOD I with SPC/RW intermittently because of dizziness. Pt has baseline R foot drop. Had a L hip fracture in June 2022 with good recovery (went to [facility] at the time).    ,  ,  ,  ,  ,  ,  ,  ,  ,  ,       HOME SUPPORT: Patient's son lives with her. He works from home. Also has supportive daughter locally.     Occupational Therapy Goals:  Initiated 3/11/2024  1.  Patient will perform grooming, standing at sink, with Supervision within 7 day(s).  2.  Patient will perform lower body dressing with Minimal Assist within 7 day(s).  3.  Patient will perform bathing with Minimal Assist within 7 day(s).  4.  Patient will perform toilet transfers with Supervision  within 7 day(s).  5.  Patient will perform all aspects of toileting with Modified [place] within 7 day(s).  6.  Patient will participate in upper extremity therapeutic exercise/activities with [place] for 10 minutes within 7 day(s).   Outcome: Progressing   OCCUPATIONAL THERAPY TREATMENT  Patient: Dorinda Olivares (73 y.o. female)  Date: 3/12/2024  Primary Diagnosis: Closed fracture of neck of left femur, initial encounter ([facility]) [S72.002A]  Closed right hip fracture, initial encounter ([facility]) [S72.001A]  Procedure(s) (LRB):  RIGHT HIP PINNING (Right) 2 Days Post-Op   Precautions: Fall Risk (WBAT RLE)                Chart, occupational therapy assessment, plan of care, and goals were reviewed.    ASSESSMENT  Patient continues to benefit from skilled OT services and is slowly progressing towards goals. Pt seated in chair however verbalizing her BP is low. Re-checked and /63. Pt opted to engage with UPPER EXTREMITIES there ex seated in c;hair and light grooming tasks stated feeling \"groggy.\" Pt

## 2024-03-12 NOTE — PROGRESS NOTES
Physical Therapy Note    PT reports feeling off this afternoon when returning from restroom with PCT. She requests to return to bed. Will return for PT tx as able.

## 2024-03-13 VITALS
OXYGEN SATURATION: 97 % | WEIGHT: 130 LBS | RESPIRATION RATE: 16 BRPM | HEART RATE: 60 BPM | HEIGHT: 62 IN | TEMPERATURE: 98.1 F | DIASTOLIC BLOOD PRESSURE: 51 MMHG | SYSTOLIC BLOOD PRESSURE: 133 MMHG | BODY MASS INDEX: 23.92 KG/M2

## 2024-03-13 LAB
ALBUMIN SERPL-MCNC: 2.8 G/DL (ref 3.5–5)
ANION GAP SERPL CALC-SCNC: 4 MMOL/L (ref 5–15)
BASOPHILS # BLD: 0.1 K/UL (ref 0–0.1)
BASOPHILS NFR BLD: 1 % (ref 0–1)
BUN SERPL-MCNC: 12 MG/DL (ref 6–20)
BUN/CREAT SERPL: 18 (ref 12–20)
CALCIUM SERPL-MCNC: 8.5 MG/DL (ref 8.5–10.1)
CHLORIDE SERPL-SCNC: 112 MMOL/L (ref 97–108)
CO2 SERPL-SCNC: 27 MMOL/L (ref 21–32)
CREAT SERPL-MCNC: 0.66 MG/DL (ref 0.55–1.02)
DIFFERENTIAL METHOD BLD: ABNORMAL
EOSINOPHIL # BLD: 0.9 K/UL (ref 0–0.4)
EOSINOPHIL NFR BLD: 11 % (ref 0–7)
ERYTHROCYTE [DISTWIDTH] IN BLOOD BY AUTOMATED COUNT: 13.2 % (ref 11.5–14.5)
GLUCOSE SERPL-MCNC: 97 MG/DL (ref 65–100)
HCT VFR BLD AUTO: 27 % (ref 35–47)
HGB BLD-MCNC: 8.6 G/DL (ref 11.5–16)
IMM GRANULOCYTES # BLD AUTO: 0 K/UL (ref 0–0.04)
IMM GRANULOCYTES NFR BLD AUTO: 0 % (ref 0–0.5)
LYMPHOCYTES # BLD: 1.5 K/UL (ref 0.8–3.5)
LYMPHOCYTES NFR BLD: 19 % (ref 12–49)
MCH RBC QN AUTO: 31.2 PG (ref 26–34)
MCHC RBC AUTO-ENTMCNC: 31.9 G/DL (ref 30–36.5)
MCV RBC AUTO: 97.8 FL (ref 80–99)
MONOCYTES # BLD: 0.6 K/UL (ref 0–1)
MONOCYTES NFR BLD: 8 % (ref 5–13)
NEUTS SEG # BLD: 4.6 K/UL (ref 1.8–8)
NEUTS SEG NFR BLD: 61 % (ref 32–75)
NRBC # BLD: 0 K/UL (ref 0–0.01)
NRBC BLD-RTO: 0 PER 100 WBC
PHOSPHATE SERPL-MCNC: 2.4 MG/DL (ref 2.6–4.7)
PLATELET # BLD AUTO: 163 K/UL (ref 150–400)
PMV BLD AUTO: 11.2 FL (ref 8.9–12.9)
POTASSIUM SERPL-SCNC: 3.8 MMOL/L (ref 3.5–5.1)
RBC # BLD AUTO: 2.76 M/UL (ref 3.8–5.2)
SODIUM SERPL-SCNC: 143 MMOL/L (ref 136–145)
WBC # BLD AUTO: 7.7 K/UL (ref 3.6–11)

## 2024-03-13 PROCEDURE — 85025 COMPLETE CBC W/AUTO DIFF WBC: CPT

## 2024-03-13 PROCEDURE — 6370000000 HC RX 637 (ALT 250 FOR IP): Performed by: HOSPITALIST

## 2024-03-13 PROCEDURE — 94761 N-INVAS EAR/PLS OXIMETRY MLT: CPT

## 2024-03-13 PROCEDURE — 6370000000 HC RX 637 (ALT 250 FOR IP): Performed by: INTERNAL MEDICINE

## 2024-03-13 PROCEDURE — 6360000002 HC RX W HCPCS: Performed by: ORTHOPAEDIC SURGERY

## 2024-03-13 PROCEDURE — 99024 POSTOP FOLLOW-UP VISIT: CPT | Performed by: PHYSICIAN ASSISTANT

## 2024-03-13 PROCEDURE — 97116 GAIT TRAINING THERAPY: CPT

## 2024-03-13 PROCEDURE — 36415 COLL VENOUS BLD VENIPUNCTURE: CPT

## 2024-03-13 PROCEDURE — 80069 RENAL FUNCTION PANEL: CPT

## 2024-03-13 PROCEDURE — 97530 THERAPEUTIC ACTIVITIES: CPT

## 2024-03-13 RX ORDER — OXYCODONE HYDROCHLORIDE 5 MG/1
5 TABLET ORAL EVERY 4 HOURS PRN
Qty: 18 TABLET | Refills: 0 | Status: SHIPPED | OUTPATIENT
Start: 2024-03-13 | End: 2024-03-16

## 2024-03-13 RX ORDER — LEVOTHYROXINE SODIUM 0.07 MG/1
75 TABLET ORAL
Qty: 30 TABLET | Refills: 3 | Status: SHIPPED
Start: 2024-03-14

## 2024-03-13 RX ORDER — OXYCODONE HYDROCHLORIDE 5 MG/1
5 TABLET ORAL EVERY 4 HOURS PRN
Qty: 18 TABLET | Refills: 0 | Status: SHIPPED | OUTPATIENT
Start: 2024-03-13 | End: 2024-03-13

## 2024-03-13 RX ORDER — ENOXAPARIN SODIUM 100 MG/ML
40 INJECTION SUBCUTANEOUS DAILY
Qty: 10.8 ML | Refills: 0 | Status: SHIPPED
Start: 2024-03-14 | End: 2024-04-10

## 2024-03-13 RX ADMIN — EZETIMIBE 10 MG: 10 TABLET ORAL at 08:41

## 2024-03-13 RX ADMIN — ENOXAPARIN SODIUM 40 MG: 100 INJECTION SUBCUTANEOUS at 08:41

## 2024-03-13 RX ADMIN — Medication 1 TABLET: at 08:41

## 2024-03-13 RX ADMIN — METOPROLOL TARTRATE 25 MG: 25 TABLET, FILM COATED ORAL at 08:41

## 2024-03-13 RX ADMIN — ASPIRIN 81 MG: 81 TABLET, CHEWABLE ORAL at 08:41

## 2024-03-13 RX ADMIN — ACETAMINOPHEN 650 MG: 325 TABLET ORAL at 10:41

## 2024-03-13 RX ADMIN — LEVOTHYROXINE SODIUM 75 MCG: 0.07 TABLET ORAL at 06:51

## 2024-03-13 RX ADMIN — CLOPIDOGREL BISULFATE 75 MG: 75 TABLET ORAL at 08:41

## 2024-03-13 RX ADMIN — ACETAMINOPHEN 650 MG: 325 TABLET ORAL at 06:51

## 2024-03-13 RX ADMIN — SENNOSIDES AND DOCUSATE SODIUM 1 TABLET: 50; 8.6 TABLET ORAL at 08:41

## 2024-03-13 ASSESSMENT — PAIN SCALES - GENERAL: PAINLEVEL_OUTOF10: 0

## 2024-03-13 NOTE — DISCHARGE INSTRUCTIONS
HOSPITALIST DISCHARGE INSTRUCTIONS  NAME: Dorinda Olivares   :  1950   MRN:  [id number]     Date/Time:  3/13/2024 2:27 PM    ADMIT DATE: 3/8/2024     DISCHARGE DATE: 3/13/2024     DISCHARGE DIAGNOSIS:  Acute right femoral neck fracture  Status post closed reduction percutaneous pinning 3/10/2024      DIET:  heart healthy    ACTIVITY:  WBAT RLE    OTHER INSTRUCTIONS:    Fall precautions.  Continue PT and OT.      MEDICATIONS:      It is important that you take the medication exactly as they are prescribed.   Keep your medication in the bottles provided by the pharmacist and keep a list of the medication names, dosages, and times to be taken in your wallet.   Do not take other medications without consulting your doctor.       If you experience any of the following symptoms then please call your primary care physician or return to the emergency room if you cannot get hold of your doctor:  Fever, chills, nausea, vomiting, diarrhea, change in mentation, falling, bleeding, shortness of breath    Follow Up:  Please call and set up an appointment to see them in 1 week.      Nuha Whitehead MD  [address] [facility] [id number] [phone]    Follow up in 4 week(s)      Kenneth Rosas MD  [address] [facility].  [facility] [address] [phone]    Follow up in 1 week(s)          Information obtained by :  I understand that if any problems occur once I am at home I am to contact my physician.    I understand and acknowledge receipt of the instructions indicated above.                                                                                                                                               Physician's or R.N.'s Signature                                                                  Date/Time                                                                                                                                              Patient or Representative Signature

## 2024-03-13 NOTE — PROGRESS NOTES
HGB 8.6*     Lab Results   Component Value Date/Time     03/13/2024 06:52 AM    K 3.8 03/13/2024 06:52 AM     03/13/2024 06:52 AM    CO2 27 03/13/2024 06:52 AM    BUN 12 03/13/2024 06:52 AM       PT/OT:                Patient mobility                         ASSESSMENT / PLAN:   Principal Problem:    Closed right hip fracture, initial encounter ([facility])  Active Problems:    Dizziness  Resolved Problems:    * No resolved hospital problems. *            A: 1. S/P right hip CRPP POD 3      Pain Control: Dilaudid .5mg q3 hours.  Tylenol 650mg q6 hours.    DVT Prophylaxis: Lovenox 40 mg SC daily.    Hemodynamics: HgB 8.6.  ABLA.  Asymptomatic.  Monitor   Activity: WBAT RLE.   Disposition: IPR versus SNF     Signed By:  MUKUL Liu    [facility]  [facility]

## 2024-03-13 NOTE — DISCHARGE SUMMARY
Hospitalist Discharge Summary     Patient ID:  Dorinda Olivares  [id number]  73 y.o.  1950    Admit date: 3/8/2024    Discharge date and time: 3/13/2024    Admission Diagnoses: Closed fracture of neck of left femur, initial encounter ([facility]) [S72.002A]  Closed right hip fracture, initial encounter ([facility]) [S72.001A]      Discharge Diagnoses:      Principal Problem:    Closed right hip fracture, initial encounter ([facility])  Active Problems:    Dizziness  Resolved Problems:    * No resolved hospital problems. 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 Owusu  Pulmonary nodules  Chronic right foot drop  Hepatomegaly on CT pelvis  Hepatic lobe hemangioma on [facility] CT chest  Hypothyroid s/p partial thyroidectomy        Hospital Course:     Dorinda Olivares  is a 73 y.o.  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 Owusu, and hypothyroidism presenting with acute onset of mechanical fall resulting in a right femoral neck fracture.    Acute right femoral neck fracture  Mechanical fall  -Orthopedic surgery consulted.  -Status post closed reduction percutaneous pinning 3/10/2024  -Cardiology consulted preoperatively.  Intermediate risk stratification.  -DAPT was held for surgery and resumed thereafter  -Scheduled tylenol q6h.  Oxycodone prn and dilaudid prn  -Discussed need for bowel regimen, patient prefers to use fruit and juice over stool softeners  -Consult PT and OT, requesting consulting arms for  DULCOLAX     diclofenac sodium 1 % Gel  Commonly known as: VOLTAREN     loratadine 10 MG tablet  Commonly known as: CLARITIN     vitamin D 25 MCG (1000 UT) Tabs tablet  Commonly known as: CHOLECALCIFEROL               Where to Get Your Medications        Information about where to get these medications is not yet available    Ask your nurse or doctor about these medications  enoxaparin 40 MG/0.4ML  levothyroxine 75 MCG tablet  oxyCODONE 5 MG immediate release tablet          I have reviewed discharge instructions with the patient, and they verbalized understanding.     Approximate time spent in patient care on day of discharge: 40 mins    Signed:  Beny Worley MD  3/13/2024  2:32 PM

## 2024-03-13 NOTE — PLAN OF CARE
Problem: Physical Therapy - Adult  Goal: By Discharge: Performs mobility at highest level of function for planned discharge setting.  See evaluation for individualized goals.  Description: FUNCTIONAL STATUS PRIOR TO ADMISSION: Patient was independent and active without use of DME. Pt with baseline R foot drop and does not wear a brace    HOME SUPPORT PRIOR TO ADMISSION: The patient lived with her son but did not require assistance.    Physical Therapy Goals  Initiated 3/11/2024  1.  Patient will move from supine to sit and sit to supine and roll side to side in bed with supervision/set-up within 7 day(s).    2.  Patient will perform sit to stand with contact guard assist within 7 day(s).  3.  Patient will transfer from bed to chair and chair to bed with contact guard assist using the least restrictive device within 7 day(s).  4.  Patient will ambulate with contact guard assist for 50 feet with the least restrictive device within 7 day(s).   5.  Patient will ascend/descend 10 stairs with single handrail(s) with minimal assistance within 7 day(s).    Outcome: Progressing     PHYSICAL THERAPY TREATMENT    Patient: Dorinda Olivares (73 y.o. female)  Date: 3/13/2024  Diagnosis: Closed fracture of neck of left femur, initial encounter ([facility]) [S72.002A]  Closed right hip fracture, initial encounter ([facility]) [S72.001A] Closed right hip fracture, initial encounter ([facility])  Procedure(s) (LRB):  RIGHT HIP PINNING (Right) 3 Days Post-Op  Precautions: Fall Risk (WBAT RLE)                    ASSESSMENT:  Patient continues to benefit from skilled PT services and is progressing towards goals. Able to progress gait distance this date with good carryover with progression from step-to to step-through gait pattern. Patient with preference towards R LE extended to decrease use of R LE for sit<>stand but able to progress to more equal use of LEs in sit<>stand with cues/practice. She is motivated to participate and progress mobility and

## 2024-03-13 NOTE — CARE COORDINATION
3/13/2024  12:50 PM  Care Management Progress Note      ICD-10-CM    1. Closed fracture of neck of left femur, initial encounter ([facility])  S72.002A       2. Dizziness  R42 Echo (TTE) complete (PRN contrast/bubble/strain/3D)     Echo (TTE) complete (PRN contrast/bubble/strain/3D)     CANCELED: Echo (TTE) complete (PRN contrast/bubble/strain/3D)     CANCELED: Echo (TTE) complete (PRN contrast/bubble/strain/3D)          RUR:  11%  Risk Level: [x]Low []Moderate []High    Transition of care plan:  Discharge order submitted. Pt has medically cleared.   [facility] - [facility] accepted pt and can admit pt today. Nursing, please call report to , RM 2022 (Dr Trejo is accepting physician).   Outpatient follow-up.  Hospital to Home Wheelchair transport arranged for 3:30 PM. Pt's son, Alen, agreeable to pay transport cost ($194.67) prior to .        03/11/24 6312   Service Assessment   Patient Orientation Alert and Oriented   Cognition Alert   History Provided By Patient   Primary Caregiver Self   Support Systems Family Members   Patient's Healthcare Decision Maker is: Legal Next of Kin   PCP Verified by CM Yes  (Abhay Powell)   Prior Functional Level Independent in ADLs/IADLs   Current Functional Level Assistance with the following:   Can patient return to prior living arrangement No  (Rehab recommended)   Ability to make needs known: Good   Family able to assist with home care needs: Other (comment)  (Pt's son is able to work from home if goes home with HH.)   Would you like for me to discuss the discharge plan with any other family members/significant others, and if so, who? Yes  (As needed)   Financial Resources Medicare   Community Resources None   Social/Functional History   Lives With Son   Type of Home House   Home Layout Two level;Bed/Bath upstairs   Entrance Stairs - Number of Steps 5   Home Equipment Cane;Walker, standard  (Pt uses no DME at baseline.)   ADL Assistance Independent   Homemaking Assistance  Independent   Ambulation Assistance Independent   Transfer Assistance Independent   Active  Yes   Discharge Planning   Type of Residence House   Living Arrangements Children  (Pt's adult son lives with pt.)   Current Services Prior To Admission None  (2023 - [facility])   DME Ordered? No   Potential Assistance Purchasing Medications No   Type of Home Care Services None   Patient expects to be discharged to: Rehabilitation facility   Services At/After Discharge   Services At/After Discharge Inpatient rehab   Mode of Transport at Discharge Other (see comment)   Confirm Follow Up Transport Family   Condition of Participation: Discharge Planning   The Patient and/or Patient Representative was provided with a Choice of Provider? Patient   The Patient and/Or Patient Representative agree with the Discharge Plan? Yes   Freedom of Choice list was provided with basic dialogue that supports the patient's individualized plan of care/goals, treatment preferences, and shares the quality data associated with the providers?  Yes

## 2024-03-14 NOTE — PROGRESS NOTES
Physician Progress Note      PATIENT:               CLIF SZYMANSKI  [facility] #:                  [id number]  :                       1950  ADMIT DATE:       3/8/2024 4:18 PM  DISCH DATE:        3/13/2024 3:21 PM  RESPONDING  PROVIDER #:        Beny Worley MD          QUERY TEXT:    Pt admitted with Right femoral neck after mechanical fall, noted to have PMH   of Osteoporosis in H&P . If possible, please document in progress notes   and discharge summary if you are evaluating and/or treating any of the   following:    The medical record reflects the following:  Risk Factors: osteoporosis, 73-year-old female  Clinical Indicators: H&P  \"Acute right femoral neck fracture, POA due to   mechanical fall.\"  - PMH-Osteoporosis  - CT Pelvis  \"Acute right femoral neck fracture.\"  Treatment: oyster shell calcium with D tablet daily, Ortho consult with   percutaneous pinning, monitoring    Thank you,    Shannon Hawthorne RN  CDI  Options provided:  -- Osteoporotic Right femoral neck fracture following fall which would not   usually break a normal, healthy bone  -- Traumatic Right femoral neck fracture  -- Other - I will add my own diagnosis  -- Disagree - Not applicable / Not valid  -- Disagree - Clinically unable to determine / Unknown  -- Refer to Clinical Documentation Reviewer    PROVIDER RESPONSE TEXT:    This patient has an osteoporotic Right femoral neck fracture following fall   which would not break a normal, healthy bone.    Query created by: Shannon Hawthorne on 3/11/2024 11:19 AM      Electronically signed by:  Beny Worley MD 3/14/2024 5:48 PM

## (undated) DEVICE — 3M™ TEGADERM™ TRANSPARENT FILM DRESSING FRAME STYLE, 1628, 6 IN X 8 IN (15 CM X 20 CM), 10/CT 8CT/CASE: Brand: 3M™ TEGADERM™

## (undated) DEVICE — SUTURE MCRYL SZ 3-0 L27IN ABSRB UD L19MM PS-2 3/8 CIR PRIM Y427H

## (undated) DEVICE — BASIC GENERAL-SFMC: Brand: MEDLINE INDUSTRIES, INC.

## (undated) DEVICE — SOLUTION IRRIG 1000ML STRL H2O USP PLAS POUR BTL

## (undated) DEVICE — SOLUTION IRRIG 1000ML 0.9% SOD CHL USP POUR PLAS BTL

## (undated) DEVICE — 1LYRTR 16FR10ML100%SIL UMS SNP: Brand: MEDLINE INDUSTRIES, INC.

## (undated) DEVICE — PENCIL SMK EVAC ALL IN 1 DSGN ENH VISIBILITY IMPROVED AIR

## (undated) DEVICE — SUTURE VCRL + SZ 1-0 L36IN ABSRB UD CTX 1/2 CIR TAPR PNT VCP977H

## (undated) DEVICE — Device: Brand: JELCO

## (undated) DEVICE — CANNULATED DRILL

## (undated) DEVICE — SYRINGE IRRIG 60ML SFT PLIABLE BLB EZ TO GRP 1 HND USE W/

## (undated) DEVICE — COVER,MAYO STAND,STERILE: Brand: MEDLINE

## (undated) DEVICE — TUBING HYDR IRR --

## (undated) DEVICE — SUTURE VCRL SZ 2-0 L36IN ABSRB UD L36MM CT-1 1/2 CIR J945H

## (undated) DEVICE — MARKER,SKIN,WI/RULER AND LABELS: Brand: MEDLINE

## (undated) DEVICE — SUTURE VCRL SZ 0 L36IN ABSRB UD L36MM CT-1 1/2 CIR J946H

## (undated) DEVICE — HANDPIECE SET WITH BONE CLEANING TIP AND SUCTION TUBE: Brand: INTERPULSE

## (undated) DEVICE — PATIENT PROTECTIVE PAD FOR IMP UNIVERSAL LATERAL HIP POSITIONER (ULP) (6/CASE): Brand: PATIENT PROTECTIVE PAD

## (undated) DEVICE — FORCEPS BX L240CM JAW DIA2.8MM L CAP W/ NDL MIC MESH TOOTH

## (undated) DEVICE — THREADED GUIDE WIRE

## (undated) DEVICE — SUTURE ETHBND EXCEL SZ 5 L30IN NONABSORBABLE GRN L40MM V-37 MB66G

## (undated) DEVICE — 4-PORT MANIFOLD: Brand: NEPTUNE 2

## (undated) DEVICE — SUTURE STRATAFIX SYMMETRIC SZ 1 L18IN ABSRB VLT CT1 L36CM SXPP1A404

## (undated) DEVICE — GLOVE SURG SZ 8 L12IN FNGR THK79MIL GRN LTX FREE

## (undated) DEVICE — 6619 2 PTNT ISO SYS INCISE AREA&LT;(&GT;&&LT;)&GT;P: Brand: STERI-DRAPE™ IOBAN™ 2

## (undated) DEVICE — PREP KIT PEEL PTCH POVIDONE IOD

## (undated) DEVICE — DRAPE,HIP,W/POUCHES,STERILE: Brand: MEDLINE

## (undated) DEVICE — NEEDLE SPNL L3.5IN PNK HUB S STL REG WALL FIT STYL W/ QNCKE

## (undated) DEVICE — TOTAL JOINT-SFMC: Brand: MEDLINE INDUSTRIES, INC.

## (undated) DEVICE — 3M™ IOBAN™ 2 ANTIMICROBIAL INCISE DRAPE 6650EZ: Brand: IOBAN™ 2

## (undated) DEVICE — 1010 S-DRAPE TOWEL DRAPE 10/BX: Brand: STERI-DRAPE™

## (undated) DEVICE — GLOVE ORANGE PI 7   MSG9070

## (undated) DEVICE — SOLUTION IRRIG 3000ML 0.9% SOD CHL USP UROMATIC PLAS CONT

## (undated) DEVICE — C-ARM: Brand: UNBRANDED

## (undated) DEVICE — SUTURE VCRL SZ 2-0 L27IN ABSRB UD L26MM SH 1/2 CIR J417H

## (undated) DEVICE — GLOVE ORANGE PI 8   MSG9080

## (undated) DEVICE — DRESSING FOAM W4XL10IN AG SIL ADH ANTIMIC POSTOP OPTIFOAM

## (undated) DEVICE — BLUNTFILL: Brand: MONOJECT

## (undated) DEVICE — TOWEL SURG W17XL27IN STD BLU COT NONFENESTRATED PREWASHED

## (undated) DEVICE — ELECTRODE BLDE L4IN NONINSULATED EDGE

## (undated) DEVICE — GLOVE ORANGE PI 7 1/2   MSG9075

## (undated) DEVICE — 3M™ TEGADERM™ TRANSPARENT FILM DRESSING FRAME STYLE, 1624W, 2-3/8 IN X 2-3/4 IN (6 CM X 7 CM), 100/CT 4CT/CASE: Brand: 3M™ TEGADERM™

## (undated) DEVICE — SUTURE MCRYL SZ 4-0 L27IN ABSRB UD L19MM PS-2 1/2 CIR PRIM Y426H

## (undated) DEVICE — PILLOW ABD 6X18X25IN L

## (undated) DEVICE — BANDAGE COHESIVE L5YDXW6IN M TAN CO FLX

## (undated) DEVICE — DERMABOND SKIN ADH 0.7ML -- DERMABOND ADVANCED 12/BX

## (undated) DEVICE — SUTURE VCRL SZ 0 L27IN ABSRB UD L36MM CT-1 1/2 CIR J260H

## (undated) DEVICE — HOOD, PEEL-AWAY: Brand: FLYTE

## (undated) DEVICE — STRYKER PERFORMANCE SERIES SAGITTAL BLADE: Brand: STRYKER PERFORMANCE SERIES

## (undated) DEVICE — SYRINGE MED 30ML STD CLR PLAS LUERLOCK TIP N CTRL DISP

## (undated) DEVICE — SPONGE GZ W4XL4IN COT 12 PLY TYP VII WVN C FLD DSGN STERILE